# Patient Record
Sex: FEMALE | Race: WHITE | NOT HISPANIC OR LATINO | Employment: FULL TIME | URBAN - METROPOLITAN AREA
[De-identification: names, ages, dates, MRNs, and addresses within clinical notes are randomized per-mention and may not be internally consistent; named-entity substitution may affect disease eponyms.]

---

## 2017-02-02 ENCOUNTER — OFFICE VISIT (OUTPATIENT)
Dept: OPTOMETRY | Facility: CLINIC | Age: 37
End: 2017-02-02
Payer: COMMERCIAL

## 2017-02-02 DIAGNOSIS — H49.12 FOURTH NERVE PALSY OF LEFT EYE: ICD-10-CM

## 2017-02-02 DIAGNOSIS — Z98.890 HISTORY OF STRABISMUS SURGERY: ICD-10-CM

## 2017-02-02 DIAGNOSIS — H52.203 ASTIGMATISM OF EYE, BILATERAL: Primary | ICD-10-CM

## 2017-02-02 PROCEDURE — 92014 COMPRE OPH EXAM EST PT 1/>: CPT | Mod: S$GLB,,, | Performed by: OPTOMETRIST

## 2017-02-02 PROCEDURE — 99999 PR PBB SHADOW E&M-EST. PATIENT-LVL I: CPT | Mod: PBBFAC,,, | Performed by: OPTOMETRIST

## 2017-04-17 DIAGNOSIS — G43.009 MIGRAINE WITHOUT AURA AND WITHOUT STATUS MIGRAINOSUS, NOT INTRACTABLE: ICD-10-CM

## 2017-04-17 RX ORDER — PROPRANOLOL HYDROCHLORIDE 40 MG/1
TABLET ORAL
Qty: 60 TABLET | Refills: 6 | Status: SHIPPED | OUTPATIENT
Start: 2017-04-17 | End: 2017-06-01 | Stop reason: SDUPTHER

## 2017-06-01 ENCOUNTER — OFFICE VISIT (OUTPATIENT)
Dept: NEUROLOGY | Facility: CLINIC | Age: 37
End: 2017-06-01
Payer: COMMERCIAL

## 2017-06-01 VITALS
HEIGHT: 62 IN | DIASTOLIC BLOOD PRESSURE: 78 MMHG | SYSTOLIC BLOOD PRESSURE: 116 MMHG | WEIGHT: 188.25 LBS | BODY MASS INDEX: 34.64 KG/M2 | HEART RATE: 63 BPM

## 2017-06-01 DIAGNOSIS — M54.81 BILATERAL OCCIPITAL NEURALGIA: ICD-10-CM

## 2017-06-01 DIAGNOSIS — G44.86 CERVICOGENIC HEADACHE: ICD-10-CM

## 2017-06-01 DIAGNOSIS — G43.711 CHRONIC MIGRAINE WITHOUT AURA, WITH INTRACTABLE MIGRAINE, SO STATED, WITH STATUS MIGRAINOSUS: Primary | ICD-10-CM

## 2017-06-01 PROCEDURE — 99999 PR PBB SHADOW E&M-EST. PATIENT-LVL III: CPT | Mod: PBBFAC,,, | Performed by: PSYCHIATRY & NEUROLOGY

## 2017-06-01 PROCEDURE — 99214 OFFICE O/P EST MOD 30 MIN: CPT | Mod: S$GLB,,, | Performed by: PSYCHIATRY & NEUROLOGY

## 2017-06-01 RX ORDER — METHOCARBAMOL 500 MG/1
500 TABLET, FILM COATED ORAL NIGHTLY
Qty: 30 TABLET | Refills: 6 | Status: SHIPPED | OUTPATIENT
Start: 2017-06-01 | End: 2017-12-04 | Stop reason: SDUPTHER

## 2017-06-01 RX ORDER — KETOROLAC TROMETHAMINE 10 MG/1
TABLET, FILM COATED ORAL
Qty: 20 TABLET | Refills: 6 | Status: SHIPPED | OUTPATIENT
Start: 2017-06-01 | End: 2017-12-04 | Stop reason: SDUPTHER

## 2017-06-01 RX ORDER — NARATRIPTAN 2.5 MG/1
TABLET ORAL
Qty: 9 TABLET | Refills: 6 | Status: SHIPPED | OUTPATIENT
Start: 2017-06-01 | End: 2017-12-04 | Stop reason: SDUPTHER

## 2017-06-01 RX ORDER — GABAPENTIN 300 MG/1
600 CAPSULE ORAL NIGHTLY
Qty: 30 CAPSULE | Refills: 6 | Status: SHIPPED | OUTPATIENT
Start: 2017-06-01 | End: 2017-10-13 | Stop reason: SDUPTHER

## 2017-06-01 RX ORDER — NAPROXEN 500 MG/1
500 TABLET ORAL 2 TIMES DAILY PRN
Qty: 20 TABLET | Refills: 6 | Status: SHIPPED | OUTPATIENT
Start: 2017-06-01 | End: 2017-12-04 | Stop reason: SDUPTHER

## 2017-06-01 RX ORDER — PROPRANOLOL HYDROCHLORIDE 40 MG/1
40 TABLET ORAL 2 TIMES DAILY
Qty: 60 TABLET | Refills: 6 | Status: SHIPPED | OUTPATIENT
Start: 2017-06-01 | End: 2017-12-04 | Stop reason: SDUPTHER

## 2017-06-01 RX ORDER — SUMATRIPTAN SUCCINATE 100 MG/1
100 TABLET ORAL
Qty: 9 TABLET | Refills: 6 | Status: SHIPPED | OUTPATIENT
Start: 2017-06-01 | End: 2017-07-01

## 2017-06-01 NOTE — PROGRESS NOTES
Subjective:       Patient ID: Silke Fulton is a 36 y.o. female.    Reason for Consult: Migraine      Interval History:  Silke Fulton is here for follow up. Their condition has clinically improved.  On her current regimen she has been able to only half a few headaches since her last visit.  She is very stable with her medication.  She does note that some of the referred pain from the left occipital area to her left retro-orbital area has returned although she does admit that she has not been as diligent with the stretching exercises and home exercise program that she had previously been taught in physical therapy.     Objective:     Vitals:    06/01/17 1315   BP: 116/78   Pulse: 63     Patient is awake alert oriented person place and time.  Cervical range of motion is limited in flexion.  Tinel sign positive at left greater occipital nerve.  Focused examination was undertaken today. Over 50% of face to face time of 25 minute visit time was in giving guidance, counseling and discussing treatment options.    Assessment/Plan:     Problem List Items Addressed This Visit        Neuro    Chronic migraine without aura, with intractable migraine, so stated, with status migrainosus - Primary    Overview     Preventive - gabapentin QHS, robaxin QHS, propranolol BID  Rescue - Imitrex/Naproxen, Amerge, Ketorolac  Triggers - weather, humidity         Current Assessment & Plan     Refill meds, consider weaning meds at next visit         Relevant Medications    gabapentin (NEURONTIN) 300 MG capsule    ketorolac (TORADOL) 10 mg tablet    methocarbamol (ROBAXIN) 500 MG Tab    naproxen (EC NAPROSYN) 500 MG EC tablet    naratriptan (AMERGE) 2.5 MG tablet    propranolol (INDERAL) 40 MG tablet    sumatriptan (IMITREX) 100 MG tablet    Occipital neuralgia    Relevant Medications    gabapentin (NEURONTIN) 300 MG capsule    naproxen (EC NAPROSYN) 500 MG EC tablet    Cervicogenic headache    Relevant Medications     methocarbamol (ROBAXIN) 500 MG Tab    naproxen (EC NAPROSYN) 500 MG EC tablet      Other Visit Diagnoses    None.       36-year-old female presents for evaluation and follow-up of complex multifactorial headaches including cervicogenic headache occipital neuralgia and migraine.  At this point in time I will continue her medications as they are.  I will see her back in 6 months and consider weaning or reducing one of her rescue medications and one of her preventative medications.  I will follow up with them in 6 month(s).  The patient verbalizes understanding and agreement with the treatment plan. I have discussed risks, benefits and alternatives to the treatment plan. Questions were sought and answered to her stated verbal satisfaction.        Lakhwinder Luther MD    This note is dictated on Dragon Natural Speaking word recognition program. There are word recognition mistakes that are occasionally missed on review.

## 2017-06-24 ENCOUNTER — OFFICE VISIT (OUTPATIENT)
Dept: INTERNAL MEDICINE | Facility: CLINIC | Age: 37
End: 2017-06-24
Payer: COMMERCIAL

## 2017-06-24 VITALS
SYSTOLIC BLOOD PRESSURE: 96 MMHG | DIASTOLIC BLOOD PRESSURE: 71 MMHG | WEIGHT: 187.38 LBS | HEIGHT: 62 IN | TEMPERATURE: 99 F | BODY MASS INDEX: 34.48 KG/M2 | HEART RATE: 75 BPM

## 2017-06-24 DIAGNOSIS — J02.9 PHARYNGITIS, UNSPECIFIED ETIOLOGY: ICD-10-CM

## 2017-06-24 DIAGNOSIS — J40 BRONCHITIS: Primary | ICD-10-CM

## 2017-06-24 LAB — DEPRECATED S PYO AG THROAT QL EIA: NEGATIVE

## 2017-06-24 PROCEDURE — 87081 CULTURE SCREEN ONLY: CPT

## 2017-06-24 PROCEDURE — 99213 OFFICE O/P EST LOW 20 MIN: CPT | Mod: S$GLB,,, | Performed by: FAMILY MEDICINE

## 2017-06-24 PROCEDURE — 99999 PR PBB SHADOW E&M-EST. PATIENT-LVL IV: CPT | Mod: PBBFAC,,, | Performed by: FAMILY MEDICINE

## 2017-06-24 PROCEDURE — 87880 STREP A ASSAY W/OPTIC: CPT | Mod: PO

## 2017-06-24 NOTE — PROGRESS NOTES
Subjective:     Patient ID: Silke Fulton is a 36 y.o. female.    Chief Complaint: Nasal Congestion (onset Tuesday)    HPI sick for about 5-6 days with coughing and nasal congestion, some ear pain(mostly the left), sore throat,   Some low grade temp she believes, she has had some chills, some nausea and vomiting, some anterior chest pain -feels like her chest is on fire-worse when she is coughing, her lungs ache, no wheezing or rattling,   Some laryngitis, the cough is severe and shakes her body, she has been using mucinex since Tuesday.  Review of Systems  per HPI  Objective:      Physical Exam   Constitutional: She appears well-developed and well-nourished.   HENT:   Head: Normocephalic and atraumatic.   Right Ear: External ear normal.   Left Ear: External ear normal.   Mild erythema of pharynx, Turbinate edema and congestion   Eyes: Conjunctivae and EOM are normal. Pupils are equal, round, and reactive to light.   Neck: Normal range of motion. Neck supple. No thyromegaly present.   Cardiovascular: Normal rate, regular rhythm, normal heart sounds and intact distal pulses.    Pulmonary/Chest: Effort normal and breath sounds normal.   Lymphadenopathy:     She has no cervical adenopathy.   Skin:   Many (too numerous to count) pigmented nevi of arms and chest and back-reviewed ABCDE of self skin exam and literature on this emailed to patient- recommend she see dermatology for consultaion   Psychiatric: She has a normal mood and affect. Her behavior is normal. Judgment and thought content normal.   Nursing note and vitals reviewed.      Assessment:     Silke was seen today for nasal congestion.    Diagnoses and all orders for this visit:    Bronchitis    Pharyngitis, unspecified etiology  -     THROAT SCREEN, RAPID    Other orders  -     Strep A culture, throat  I will be in touch with the test results and make further recommendations then  Rest fluids, garggle with warm salty water,   Recommend mucinex  and tylenol , call or recheck if symptoms worsen  pigmented nevi- follow with dermatolgy  Encourage self skin exam

## 2017-06-24 NOTE — PATIENT INSTRUCTIONS
What Is Acute Bronchitis?  Acute or short-term bronchitis last for days or weeks. It occurs when the bronchial tubes (airways in the lungs) are irritated by a virus, bacteria, or allergen. This causes a cough that produces yellow or greenish mucus.  Inside healthy lungs    Air travels in and out of the lungs through the airways. The linings of these airways produce sticky mucus. This mucus traps particles that enter the lungs. Tiny structures called cilia then sweep the particles out of the airways.     Healthy airway: Airways are normally open. Air moves in and out easily.      Healthy cilia: Tiny, hairlike cilia sweep mucus and particles up and out of the airways.   Lungs with bronchitis  Bronchitis often occurs with a cold or the flu virus. The airways become inflamed (red and swollen). There is a deep hacking cough from the extra mucus. Other symptoms may include:  · Wheezing  · Chest discomfort  · Shortness of breath  · Mild fever  A second infection, this time due to bacteria, may then occur. And airways irritated by allergens or smoke are more likely to get infected.        Inflamed airway: Inflammation and extra mucus narrow the airway, causing shortness of breath.      Impaired cilia: Extra mucus impairs cilia, causing congestion and wheezing. Smoking makes the problem worse.   Making a diagnosis  A physical exam, health history, and certain tests help your healthcare provider make the diagnosis.  Health history  Your healthcare provider will ask you about your symptoms.  The exam  Your provider listens to your chest for signs of congestion. He or she may also check your ears, nose, and throat.  Possible tests  · A sputum test for bacteria. This requires a sample of mucus from the lungs.  · A nasal or throat swab for bacterial infection.  · A chest X-ray if your healthcare provider thinks you have pneumonia.  · Tests to check for an underlying condition, such as allergies, asthma, or COPD. You may need  to see a specialist for more lung function testing.  Treating a cough  The main treatment for bronchitis is easing symptoms. Avoiding smoke, allergens, and other things that trigger coughing can often help. If the infection is bacterial, you may be given antibiotics. During the illness, it's important to get plenty of sleep. To ease symptoms:  · Dont smoke, and avoid secondhand smoke.  · Use a humidifier, or breathe in steam from a hot shower. This may help loosen mucus.  · Drink a lot of water and juice. They can soothe the throat and may help thin mucus.  · Sit up or use extra pillows when in bed to help lessen coughing and congestion.  · Ask your provider about using cough medicine, pain and fever medicine, or a decongestant.  Antibiotics  Most cases of bronchitis are caused by cold or flu viruses. Antibiotics dont treat viral illness. Taking antibiotics when they are not needed increases your risk of getting an infection later that is antibiotic-resistant. Your provider will prescribe antibiotics if the infection is caused by bacteria. If they are prescribed:  · Take the medicine until it is used up, even if symptoms have improved. If you dont, the bronchitis may come back.  · Take them as directed. For instance, some medicines should be taken with food.  · Ask your provider or pharmacist what side effects are common, and what to do about them.  Follow-up care  You should see your provider again in 2 to 3 weeks. By this time, symptoms should have improved. An infection that lasts longer may mean you have a more serious problem.  Prevention  · Avoid tobacco smoke. If you smoke, quit. Stay away from smoky places. Ask friends and family not to smoke around you, or in your home or car.  · Get checked for allergies.  · Ask your provider about getting a yearly flu shot, and pneumococcal or pneumonia shots.  · Wash your hands often. This helps reduce the chance of picking up viruses that cause colds and flu.  Call  your healthcare provider if:  · Symptoms worsen, or new symptoms develop.  · Breathing problems worsen or  become severe.  · Symptoms dont get better within a week, or within 3 days of taking antibiotics.   Date Last Reviewed: 6/18/2014  © [id number] [employer]. [address] [place] [address], [place] [address]. All rights reserved. This information is not intended as a substitute for professional medical care. Always follow your healthcare professional's instructions.

## 2017-06-26 LAB — BACTERIA THROAT CULT: NORMAL

## 2017-08-03 ENCOUNTER — TELEPHONE (OUTPATIENT)
Dept: INTERNAL MEDICINE | Facility: CLINIC | Age: 37
End: 2017-08-03

## 2017-08-03 NOTE — TELEPHONE ENCOUNTER
----- Message from Ascencion Corrales sent at 8/3/2017 12:27 PM CDT -----  Contact: self  cell phone   Need order in for Urine test to [facility], pt believes she has UTI.    Patient state if no answer its okay to leave a message on vm.

## 2017-08-04 ENCOUNTER — LAB VISIT (OUTPATIENT)
Dept: LAB | Facility: HOSPITAL | Age: 37
End: 2017-08-04
Attending: INTERNAL MEDICINE
Payer: COMMERCIAL

## 2017-08-04 ENCOUNTER — OFFICE VISIT (OUTPATIENT)
Dept: INTERNAL MEDICINE | Facility: CLINIC | Age: 37
End: 2017-08-04
Payer: COMMERCIAL

## 2017-08-04 VITALS
BODY MASS INDEX: 34.69 KG/M2 | HEART RATE: 93 BPM | DIASTOLIC BLOOD PRESSURE: 73 MMHG | HEIGHT: 62 IN | WEIGHT: 188.5 LBS | TEMPERATURE: 99 F | SYSTOLIC BLOOD PRESSURE: 120 MMHG

## 2017-08-04 DIAGNOSIS — R21 RASH: ICD-10-CM

## 2017-08-04 DIAGNOSIS — N39.0 URINARY TRACT INFECTION WITHOUT HEMATURIA, SITE UNSPECIFIED: ICD-10-CM

## 2017-08-04 DIAGNOSIS — N39.0 URINARY TRACT INFECTION WITHOUT HEMATURIA, SITE UNSPECIFIED: Primary | ICD-10-CM

## 2017-08-04 LAB
BACTERIA #/AREA URNS AUTO: ABNORMAL /HPF
BILIRUB UR QL STRIP: NEGATIVE
CLARITY UR REFRACT.AUTO: ABNORMAL
COLOR UR AUTO: YELLOW
GLUCOSE UR QL STRIP: NEGATIVE
HGB UR QL STRIP: NEGATIVE
KETONES UR QL STRIP: NEGATIVE
LEUKOCYTE ESTERASE UR QL STRIP: ABNORMAL
MICROSCOPIC COMMENT: ABNORMAL
NITRITE UR QL STRIP: NEGATIVE
PH UR STRIP: 5 [PH] (ref 5–8)
PROT UR QL STRIP: NEGATIVE
RBC #/AREA URNS AUTO: 2 /HPF (ref 0–4)
SP GR UR STRIP: 1.01 (ref 1–1.03)
SQUAMOUS #/AREA URNS AUTO: 5 /HPF
URN SPEC COLLECT METH UR: ABNORMAL
UROBILINOGEN UR STRIP-ACNC: NEGATIVE EU/DL
WBC #/AREA URNS AUTO: 23 /HPF (ref 0–5)

## 2017-08-04 PROCEDURE — 87086 URINE CULTURE/COLONY COUNT: CPT

## 2017-08-04 PROCEDURE — 87077 CULTURE AEROBIC IDENTIFY: CPT

## 2017-08-04 PROCEDURE — 99999 PR PBB SHADOW E&M-EST. PATIENT-LVL III: CPT | Mod: PBBFAC,,, | Performed by: INTERNAL MEDICINE

## 2017-08-04 PROCEDURE — 87186 SC STD MICRODIL/AGAR DIL: CPT

## 2017-08-04 PROCEDURE — 99213 OFFICE O/P EST LOW 20 MIN: CPT | Mod: S$GLB,,, | Performed by: INTERNAL MEDICINE

## 2017-08-04 PROCEDURE — 81001 URINALYSIS AUTO W/SCOPE: CPT

## 2017-08-04 PROCEDURE — 3008F BODY MASS INDEX DOCD: CPT | Mod: S$GLB,,, | Performed by: INTERNAL MEDICINE

## 2017-08-04 PROCEDURE — 87088 URINE BACTERIA CULTURE: CPT

## 2017-08-04 RX ORDER — NITROFURANTOIN 25; 75 MG/1; MG/1
100 CAPSULE ORAL 2 TIMES DAILY
Qty: 10 CAPSULE | Refills: 0 | Status: SHIPPED | OUTPATIENT
Start: 2017-08-04 | End: 2017-08-09

## 2017-08-04 RX ORDER — SUMATRIPTAN SUCCINATE 100 MG/1
100 TABLET ORAL
COMMUNITY
End: 2017-12-04 | Stop reason: SDUPTHER

## 2017-08-04 RX ORDER — PHENAZOPYRIDINE HYDROCHLORIDE 200 MG/1
200 TABLET, FILM COATED ORAL 3 TIMES DAILY PRN
Qty: 15 TABLET | Refills: 0 | Status: SHIPPED | OUTPATIENT
Start: 2017-08-04 | End: 2017-08-07

## 2017-08-04 NOTE — PROGRESS NOTES
Subjective:       Patient ID: Silke Fulton is a 37 y.o. female.    Chief Complaint: Urinary Tract Infection    HPI     37-year-old female here for evaluation of frequent urination.  Started about last week.  She had a cold and saw Dr. Forrester.  She reports that she has urinary frequency and sometimes has dysuria.  She finished her Augmentin.  She has urinary hesitancy.  She has not taken anything for the symptoms yet.    She has a rash on her side that has been present for 3 weeks.  Looked like it was a bit that she scratched.  She got some red, heat bumps and had itching. It started from the initial bite and went to her groin.  The prednisone she took for her ears helped.    Review of Systems    Objective:      Physical Exam   Constitutional: She is oriented to person, place, and time. She appears well-developed and well-nourished.   HENT:   Head: Normocephalic and atraumatic.   Mouth/Throat: No oropharyngeal exudate.   Eyes: EOM are normal. Pupils are equal, round, and reactive to light. Right eye exhibits no discharge. Left eye exhibits no discharge. No scleral icterus.   Neck: Normal range of motion. Neck supple. No tracheal deviation present. No thyromegaly present.   Cardiovascular: Normal rate, regular rhythm and normal heart sounds.  Exam reveals no gallop and no friction rub.    No murmur heard.  Pulmonary/Chest: Effort normal and breath sounds normal. No respiratory distress. She has no wheezes. She has no rales. She exhibits no tenderness.   Abdominal: Soft. Bowel sounds are normal. She exhibits no distension and no mass. There is no tenderness. There is no rebound and no guarding.   Musculoskeletal: Normal range of motion. She exhibits no edema or tenderness.   Neurological: She is alert and oriented to person, place, and time.   Skin: Skin is warm and dry. No rash noted. No erythema. No pallor.   Psychiatric: She has a normal mood and affect. Her behavior is normal.   Vitals reviewed.       Assessment:       1. Urinary tract infection without hematuria, site unspecified    2. Rash        Plan:       1.  Macrobid 100 mg twice a day ×5 days.  Pyridium as needed.  Urine culture and urinalysis.  2.  Resolved with prednisone.  Monitor.

## 2017-08-07 LAB — BACTERIA UR CULT: NORMAL

## 2017-08-08 ENCOUNTER — TELEPHONE (OUTPATIENT)
Dept: INTERNAL MEDICINE | Facility: CLINIC | Age: 37
End: 2017-08-08

## 2017-08-29 ENCOUNTER — TELEPHONE (OUTPATIENT)
Dept: INTERNAL MEDICINE | Facility: CLINIC | Age: 37
End: 2017-08-29

## 2017-08-29 NOTE — TELEPHONE ENCOUNTER
Spoke to pt. Pt advised that her she had a tdap in 9/2007, therefore she us UTD on the pertussis.  I advised that she is due for a tetanus booster, and she can get this at her local pharmacy.

## 2017-08-29 NOTE — TELEPHONE ENCOUNTER
----- Message from Kathie Gomez sent at 8/29/2017  1:50 PM CDT -----  Contact: Self [phone]  Pt requesting a call back in regard to immunizations. Would like to know if she is up to date with her pertussis vaccination if not how would she get it. Please advise

## 2017-10-13 DIAGNOSIS — G43.711 CHRONIC MIGRAINE WITHOUT AURA, WITH INTRACTABLE MIGRAINE, SO STATED, WITH STATUS MIGRAINOSUS: ICD-10-CM

## 2017-10-13 DIAGNOSIS — M54.81 BILATERAL OCCIPITAL NEURALGIA: ICD-10-CM

## 2017-10-16 RX ORDER — GABAPENTIN 300 MG/1
CAPSULE ORAL
Qty: 180 CAPSULE | Refills: 6 | Status: SHIPPED | OUTPATIENT
Start: 2017-10-16 | End: 2017-12-04 | Stop reason: SDUPTHER

## 2017-11-01 ENCOUNTER — OFFICE VISIT (OUTPATIENT)
Dept: OBSTETRICS AND GYNECOLOGY | Facility: CLINIC | Age: 37
End: 2017-11-01
Payer: COMMERCIAL

## 2017-11-01 VITALS
DIASTOLIC BLOOD PRESSURE: 70 MMHG | HEIGHT: 62 IN | BODY MASS INDEX: 33.68 KG/M2 | SYSTOLIC BLOOD PRESSURE: 114 MMHG | WEIGHT: 183 LBS

## 2017-11-01 DIAGNOSIS — Z01.419 VISIT FOR GYNECOLOGIC EXAMINATION: Primary | ICD-10-CM

## 2017-11-01 PROCEDURE — 88175 CYTOPATH C/V AUTO FLUID REDO: CPT

## 2017-11-01 PROCEDURE — 99395 PREV VISIT EST AGE 18-39: CPT | Mod: S$GLB,,, | Performed by: OBSTETRICS & GYNECOLOGY

## 2017-11-01 PROCEDURE — 99999 PR PBB SHADOW E&M-EST. PATIENT-LVL III: CPT | Mod: PBBFAC,,, | Performed by: OBSTETRICS & GYNECOLOGY

## 2017-11-01 NOTE — PROGRESS NOTES
"HISTORY OF PRESENT ILLNESS:    Silke Fulton is a 37 y.o. female, , Patient's last menstrual period was 10/27/2017.,  presents for a routine exam and has no complaints. PAP DUE AND SUBMITTED.  HAS SOME RLQ PAIN, INTERMITTENT OVER THE PAST FEW MONTHS, NOT ASSOCIATED WITH CYCLE.   POSSIBLY ASSOCIATED GI SYMPTOMS/ PROBLEMS.  REASSURED NORMAL EXAM.  DECLINES CONTRACEPTION    LAST VISIT 2016:   PAP DUE .  SISTER WITH LOSS - FAMILY WITH ?TRISOMY 13 TRANSLOCATION - (PT SAYS THAT 13 AND 14TH CHROMOSOMES CONNECTED) - WILL CHECK PT'S CHROMOSOMES NOW.  NOT CONTRACEPTING BUT NOT ACTIVELY TRYING.    LAST VISIT 2015:  LAST PAP , DISCUSSED SCREENING RECOMMENDATIONS. PREFERS PAP THIS YEAR AND THEN WILL FOLLOW RECS. STOPPED OCP LAST YEAR (HAD SOME PEDAL EDEMA LAST YEAR). DECLINES PROGESTERONE-ONLY CONTRACEPTION AND "WAIT AND SEE." - ADVISED PNV. IS CONSIDERING ACTIVELY PURSUING PREGNANCY AND WILL REF TO  FIRST FOR EVALUATION.  LLQ PAIN X 1 WEEK IN July, PRECEEDED MENSES.    Past Medical History:   Diagnosis Date    Acute gastritis without mention of hemorrhage     Esophageal reflux     Meningitis     staph aureus    Migraine without aura, without mention of intractable migraine without mention of status migrainosus     Personal history of malignant neoplasm of brain     cerebellar astrocytoma    Tarsal tunnel syndrome        Past Surgical History:   Procedure Laterality Date    cerebellar astrocytoma      eye-superior oblique palsy      right mastoidectomy         MEDICATIONS AND ALLERGIES:      Current Outpatient Prescriptions:     FEVERFEW ORAL, Take by mouth., Disp: , Rfl:     gabapentin (NEURONTIN) 300 MG capsule, TAKE 2 CAPSULES BY MOUTH EVERY EVENING, Disp: 180 capsule, Rfl: 6    ginkgo biloba 40 mg Tab, Take 120 mg by mouth., Disp: , Rfl:     ketorolac (TORADOL) 10 mg tablet, 1 pill every 8 hours as needed for headache, not to be used more than 3x in week, Disp: 20 tablet, Rfl: " 6    magnesium 250 mg Tab, Take 500 mg by mouth once daily. , Disp: , Rfl:     methocarbamol (ROBAXIN) 500 MG Tab, Take 1 tablet (500 mg total) by mouth every evening., Disp: 30 tablet, Rfl: 6    multivitamin (THERAGRAN) per tablet, Take 1 tablet by mouth once daily.  , Disp: , Rfl:     naproxen (EC NAPROSYN) 500 MG EC tablet, Take 1 tablet (500 mg total) by mouth 2 (two) times daily as needed (headache)., Disp: 20 tablet, Rfl: 6    naratriptan (AMERGE) 2.5 MG tablet, TAKE 1 TABLET BY MOUTH ONSET OF HEADACHE MAY REPEAT IN 4 HRS IF NEEDED MAX 2 TABS/3DAYS USE IN WEEK, Disp: 9 tablet, Rfl: 6    polycarbophil (FIBERCON) 625 mg tablet, Take 625 mg by mouth once daily., Disp: , Rfl:     propranolol (INDERAL) 40 MG tablet, Take 1 tablet (40 mg total) by mouth 2 (two) times daily., Disp: 60 tablet, Rfl: 6    sumatriptan (IMITREX) 100 MG tablet, Take 100 mg by mouth every 2 (two) hours as needed for Migraine., Disp: , Rfl:     Review of patient's allergies indicates:   Allergen Reactions    Septra [sulfamethoxazole-trimethoprim] Other (See Comments)    Sulfa (sulfonamide antibiotics)      Bone marrow suppression         Family History   Problem Relation Age of Onset    Breast cancer Maternal Aunt     Migraines Mother     Arrhythmia Mother     Asthma Mother     Asthma Brother     Heart failure Maternal Grandmother     Hyperlipidemia Maternal Grandmother     Hypertension Maternal Grandmother     Osteoarthritis Maternal Grandmother     Alzheimer's disease Maternal Grandmother     Hypertension Maternal Grandfather     Osteoarthritis Maternal Grandfather     Alzheimer's disease Maternal Grandfather     Colon cancer Neg Hx        Social History     Social History    Marital status:      Spouse name: N/A    Number of children: 0    Years of education: 20     Occupational History    [employer]     Social History Main Topics    Smoking status: Never Smoker    Smokeless  "tobacco: Never Used    Alcohol use 1.2 oz/week     2 Standard drinks or equivalent per week      Comment: socially    Drug use: No    Sexual activity: Yes     Partners: Male     Birth control/ protection: None     Other Topics Concern    Not on file     Social History Narrative           COMPREHENSIVE GYN HISTORY:  PAP History: Denies abnormal Paps.  Infection History: Denies STDs. Denies PID.  Benign History: Denies uterine fibroids. Denies ovarian cysts. Denies endometriosis. Denies other conditions.  Cancer History: Denies cervical cancer. Denies uterine cancer or hyperplasia. Denies ovarian cancer. Denies vulvar cancer or pre-cancer. Denies vaginal cancer or pre-cancer. Denies breast cancer. Denies colon cancer.  Sexual Activity History: Reports currently being sexually active  Menstrual History: Monthly, mild-moderate.  Contraception:no method    ROS:  GENERAL: No weight changes. No swelling. No fatigue. No fever.  CARDIOVASCULAR: No chest pain. No shortness of breath. No leg cramps.   NEUROLOGICAL: No headaches. No vision changes.  BREASTS: No pain. No lumps. No discharge.  ABDOMEN: No pain. No nausea. No vomiting. No diarrhea. No constipation.  REPRODUCTIVE: No abnormal bleeding.   VULVA: No pain. No lesions. No itching.  VAGINA: No relaxation. No itching. No odor. No discharge. No lesions.  URINARY: No incontinence. No nocturia. No frequency. No dysuria.    /70   Ht 5' 2" (1.575 m)   Wt 83 kg (182 lb 15.7 oz)   LMP 10/27/2017   BMI 33.47 kg/m²     PE:  APPEARANCE: Well nourished, well developed, in no acute distress.  AFFECT: WNL, alert and oriented x 3.  SKIN: No acne or hirsutism.  NECK: Neck symmetric, without masses or thyromegaly.  NODES: No inguinal, cervical, axillary or femoral lymph node enlargement.  CHEST: Good respiratory effort.   ABDOMEN: Soft. No tenderness or masses. No hepatosplenomegaly. No hernias.  BREASTS: Symmetrical, no skin changes, visible lesions, palpable " masses or nipple discharge bilaterally.  PELVIC: External female genitalia without lesions.  Female hair distribution. Adequate perineal body, Normal urethral meatus. Vagina moist and well rugated without lesions or discharge.  No significant cystocele or rectocele present. Cervix pink without lesions, discharge or tenderness. Uterus is normal size, regular, mobile and nontender. Adnexa without masses or tenderness.  EXTREMITIES: No edema    PROCEDURES:  Pap    DIAGNOSIS:  1. Visit for gynecologic examination  Liquid-based pap smear, screening       LABS AND TESTS ORDERED:    MEDICATIONS PRESCRIBED:    COUNSELING:   The patient was counseled today on ACS PAP guidelines, with recommendations for yearly pelvic exams unless their uterus, cervix, and ovaries were removed for benign reasons; in that case, examinations every 3-5 years are recommended.  The patient was also counseled regarding monthly breast self-examination, routine STD screening for at-risk populations, prophylactic immunizations for transmitted infections such as  HPV, Pertussis, or Influenza as appropriate, and yearly mammograms when indicated by ACS guidelines.  She was advised to see her primary care physician for all other health maintenance.    FOLLOW-UP with me annually.

## 2017-11-12 ENCOUNTER — PATIENT MESSAGE (OUTPATIENT)
Dept: OBSTETRICS AND GYNECOLOGY | Facility: HOSPITAL | Age: 37
End: 2017-11-12

## 2017-12-04 ENCOUNTER — OFFICE VISIT (OUTPATIENT)
Dept: NEUROLOGY | Facility: CLINIC | Age: 37
End: 2017-12-04
Payer: COMMERCIAL

## 2017-12-04 ENCOUNTER — TELEPHONE (OUTPATIENT)
Dept: INTERNAL MEDICINE | Facility: CLINIC | Age: 37
End: 2017-12-04

## 2017-12-04 VITALS
HEIGHT: 62 IN | BODY MASS INDEX: 34.29 KG/M2 | DIASTOLIC BLOOD PRESSURE: 78 MMHG | SYSTOLIC BLOOD PRESSURE: 142 MMHG | HEART RATE: 64 BPM | WEIGHT: 186.31 LBS

## 2017-12-04 DIAGNOSIS — G44.86 CERVICOGENIC HEADACHE: ICD-10-CM

## 2017-12-04 DIAGNOSIS — M54.81 BILATERAL OCCIPITAL NEURALGIA: ICD-10-CM

## 2017-12-04 DIAGNOSIS — Z85.841 PERSONAL HISTORY OF MALIGNANT NEOPLASM OF BRAIN: ICD-10-CM

## 2017-12-04 DIAGNOSIS — G43.711 CHRONIC MIGRAINE WITHOUT AURA, WITH INTRACTABLE MIGRAINE, SO STATED, WITH STATUS MIGRAINOSUS: Primary | ICD-10-CM

## 2017-12-04 PROCEDURE — 99999 PR PBB SHADOW E&M-EST. PATIENT-LVL III: CPT | Mod: PBBFAC,,, | Performed by: PSYCHIATRY & NEUROLOGY

## 2017-12-04 PROCEDURE — 99214 OFFICE O/P EST MOD 30 MIN: CPT | Mod: S$GLB,,, | Performed by: PSYCHIATRY & NEUROLOGY

## 2017-12-04 RX ORDER — PROPRANOLOL HYDROCHLORIDE 40 MG/1
40 TABLET ORAL 2 TIMES DAILY
Qty: 60 TABLET | Refills: 6 | Status: SHIPPED | OUTPATIENT
Start: 2017-12-04 | End: 2017-12-15 | Stop reason: SDUPTHER

## 2017-12-04 RX ORDER — METHOCARBAMOL 500 MG/1
500 TABLET, FILM COATED ORAL NIGHTLY
Qty: 30 TABLET | Refills: 6 | Status: SHIPPED | OUTPATIENT
Start: 2017-12-04 | End: 2018-09-11 | Stop reason: SDUPTHER

## 2017-12-04 RX ORDER — SUMATRIPTAN SUCCINATE 100 MG/1
100 TABLET ORAL
Qty: 9 TABLET | Refills: 6 | Status: SHIPPED | OUTPATIENT
Start: 2017-12-04 | End: 2018-04-30 | Stop reason: SDUPTHER

## 2017-12-04 RX ORDER — GABAPENTIN 300 MG/1
600 CAPSULE ORAL NIGHTLY
Qty: 180 CAPSULE | Refills: 6 | Status: SHIPPED | OUTPATIENT
Start: 2017-12-04 | End: 2017-12-15 | Stop reason: SDUPTHER

## 2017-12-04 RX ORDER — NARATRIPTAN 2.5 MG/1
TABLET ORAL
Qty: 9 TABLET | Refills: 6 | Status: SHIPPED | OUTPATIENT
Start: 2017-12-04 | End: 2018-04-30 | Stop reason: SDUPTHER

## 2017-12-04 RX ORDER — NAPROXEN 500 MG/1
500 TABLET ORAL 2 TIMES DAILY PRN
Qty: 20 TABLET | Refills: 6 | Status: SHIPPED | OUTPATIENT
Start: 2017-12-04 | End: 2018-04-30 | Stop reason: SDUPTHER

## 2017-12-04 RX ORDER — KETOROLAC TROMETHAMINE 10 MG/1
TABLET, FILM COATED ORAL
Qty: 20 TABLET | Refills: 6 | Status: SHIPPED | OUTPATIENT
Start: 2017-12-04 | End: 2018-04-30 | Stop reason: SDUPTHER

## 2017-12-04 NOTE — TELEPHONE ENCOUNTER
----- Message from Joe Islas sent at 12/4/2017  9:01 AM CST -----  Contact: Pt at [phone]  Doctor appointment and lab have been scheduled.  Please link lab orders to the lab appointment.  Date of doctor appointment:  4/2  Physical or EP:  physical  Date of lab appointment:  3/26  Comments:

## 2017-12-04 NOTE — TELEPHONE ENCOUNTER
Dr. Gifford informed.  Pt needs to be seen before order any labs.  Pt called. No answer. Left message informing that the lab appointment was cancelled.

## 2017-12-04 NOTE — PROGRESS NOTES
Subjective:       Patient ID: Silke Fulton is a 37 y.o. female.    Reason for Consult: Headache      Interval History:  Silke Fulton is here for follow up. Their condition has somewhat worsened.  She notes her headaches seem to be worse and she thinks this may be stress.    She has been having some marital strife recently.  She notes that overall she is compliant with her preventative medication regimen but notes that her headaches seem to intensify especially when she gets arguments with her .  We have discussed that it has been 2 years since her last MRI brain given her prior history of malignancy and would be reasonable to update the imaging.     Objective:     Vitals:    12/04/17 1526   BP: (!) 142/78   Pulse: 64     Patient is awake alert oriented to person place and time.  Moves all 4 tremors against gravity.  Gait and station within normal limits limits.  Cranial nerves II through XII without focal deficit.  Mild esophoria on the right eye.  Limited cervical range of motion especially on rotation.  Focused examination was undertaken today. Over 50% of face to face time of 25 minute visit time was in giving guidance, counseling and discussing treatment options.    Assessment/Plan:     Problem List Items Addressed This Visit        Neuro    Chronic migraine without aura, with intractable migraine, so stated, with status migrainosus - Primary    Overview     Preventive - gabapentin QHS, robaxin QHS, propranolol BID  Rescue - Imitrex/Naproxen, Amerge, Ketorolac  Triggers - weather, humidity         Relevant Medications    sumatriptan (IMITREX) 100 MG tablet    propranolol (INDERAL) 40 MG tablet    naratriptan (AMERGE) 2.5 MG tablet    naproxen (EC NAPROSYN) 500 MG EC tablet    methocarbamol (ROBAXIN) 500 MG Tab    ketorolac (TORADOL) 10 mg tablet    gabapentin (NEURONTIN) 300 MG capsule    Cervicogenic headache    Relevant Medications    naproxen (EC NAPROSYN) 500 MG EC tablet     methocarbamol (ROBAXIN) 500 MG Tab       Oncology    Personal history of malignant neoplasm of brain    Overview     cerebellar astrocytoma         Relevant Orders    MRI Brain W WO Contrast       Orthopedic    Occipital neuralgia    Relevant Medications    naproxen (EC NAPROSYN) 500 MG EC tablet    gabapentin (NEURONTIN) 300 MG capsule        37-year-old right-handed female presents for evaluation and follow-up of complex multifactorial headaches.  At this point in time I'll update imaging of her brain to make sure that there has not been a concurrence of worsening of her headaches with a recurrence of her CNS malignancy.  I will also refill her medications.  We will not weaning her medication at this time.  I'll see her back in 3 months and consider nerve blocks versus Botox versus medication adjustment.    The patient verbalizes understanding and agreement with the treatment plan. I have discussed risks, benefits and alternatives to the treatment plan. Questions were sought and answered to her stated verbal satisfaction.        Lakhwinder Luther MD    This note is dictated on Dragon Natural Speaking word recognition program. There are word recognition mistakes that are occasionally missed on review.

## 2017-12-15 DIAGNOSIS — M54.81 BILATERAL OCCIPITAL NEURALGIA: ICD-10-CM

## 2017-12-15 DIAGNOSIS — G43.711 CHRONIC MIGRAINE WITHOUT AURA, WITH INTRACTABLE MIGRAINE, SO STATED, WITH STATUS MIGRAINOSUS: ICD-10-CM

## 2017-12-15 RX ORDER — GABAPENTIN 300 MG/1
CAPSULE ORAL
Qty: 180 CAPSULE | Refills: 5 | Status: SHIPPED | OUTPATIENT
Start: 2017-12-15 | End: 2018-04-30 | Stop reason: SDUPTHER

## 2017-12-15 RX ORDER — PROPRANOLOL HYDROCHLORIDE 40 MG/1
TABLET ORAL
Qty: 60 TABLET | Refills: 2 | Status: SHIPPED | OUTPATIENT
Start: 2017-12-15 | End: 2018-04-30 | Stop reason: SDUPTHER

## 2017-12-22 ENCOUNTER — HOSPITAL ENCOUNTER (OUTPATIENT)
Dept: RADIOLOGY | Facility: HOSPITAL | Age: 37
Discharge: HOME | End: 2017-12-22
Attending: PSYCHIATRY & NEUROLOGY
Payer: COMMERCIAL

## 2017-12-22 ENCOUNTER — TELEPHONE (OUTPATIENT)
Dept: RADIOLOGY | Facility: HOSPITAL | Age: 37
End: 2017-12-22

## 2017-12-22 DIAGNOSIS — Z85.841 PERSONAL HISTORY OF MALIGNANT NEOPLASM OF BRAIN: ICD-10-CM

## 2017-12-22 PROCEDURE — 25500020 PHARM REV CODE 255: Performed by: PSYCHIATRY & NEUROLOGY

## 2017-12-22 PROCEDURE — 70553 MRI BRAIN STEM W/O & W/DYE: CPT | Mod: 26,,, | Performed by: RADIOLOGY

## 2017-12-22 PROCEDURE — 70553 MRI BRAIN STEM W/O & W/DYE: CPT | Mod: TC

## 2017-12-22 PROCEDURE — A9585 GADOBUTROL INJECTION: HCPCS | Performed by: PSYCHIATRY & NEUROLOGY

## 2017-12-22 RX ORDER — GADOBUTROL 604.72 MG/ML
8 INJECTION INTRAVENOUS
Status: COMPLETED | OUTPATIENT
Start: 2017-12-22 | End: 2017-12-22

## 2017-12-22 RX ADMIN — GADOBUTROL 8 ML: 604.72 INJECTION INTRAVENOUS at 11:12

## 2018-02-08 ENCOUNTER — OFFICE VISIT (OUTPATIENT)
Dept: OPTOMETRY | Facility: CLINIC | Age: 38
End: 2018-02-08
Payer: COMMERCIAL

## 2018-02-08 DIAGNOSIS — H49.12 FOURTH NERVE PALSY OF LEFT EYE: Primary | ICD-10-CM

## 2018-02-08 DIAGNOSIS — Z98.890 HISTORY OF STRABISMUS SURGERY: ICD-10-CM

## 2018-02-08 DIAGNOSIS — H52.203 ASTIGMATISM OF BOTH EYES, UNSPECIFIED TYPE: ICD-10-CM

## 2018-02-08 PROCEDURE — 92015 DETERMINE REFRACTIVE STATE: CPT | Mod: S$GLB,,, | Performed by: OPTOMETRIST

## 2018-02-08 PROCEDURE — 99212 OFFICE O/P EST SF 10 MIN: CPT | Performed by: OPTOMETRIST

## 2018-02-08 PROCEDURE — 92014 COMPRE OPH EXAM EST PT 1/>: CPT | Mod: S$GLB,,, | Performed by: OPTOMETRIST

## 2018-02-08 PROCEDURE — 99999 PR PBB SHADOW E&M-EST. PATIENT-LVL II: CPT | Mod: PBBFAC,,, | Performed by: OPTOMETRIST

## 2018-02-08 NOTE — PROGRESS NOTES
HPI     Patient's age: 37 y.o.    Approximate date of last eye examination:  2/2/17  Name of last eye doctor seen: Dr. Cruz    Pt states that she is here for annual  Eye exam, not having any trouble   with eyes.    Wears glasses? yes     Wears CLs?:  no            Headaches?  no  Eye pain/discomfort?  no                                                                                     Flashes?  no  Floaters?  yes  Diplopia/Double vision?  no    Patient's Ocular History:         Any eye surgeries? no        Family history of eye disease?  no    Significant patient medical history:         1. Diabetes?  no       If yes, IDDM or NIDDM? no   2. HBP?  no                 ! OTC eyedrops currently using:  none   ! Prescription eye meds currently using:  none         Last edited by Meggan Rodriguez MA on 2/8/2018  3:42 PM. (History)            Assessment /Plan     For exam results, see Encounter Report.    Fourth nerve palsy of left eye    History of strabismus surgery    Astigmatism of both eyes, unspecified type      Fourth nerve palsy of left eye  History of strabismus surgery  Pt reports surgery when she was 11 years old  Astigmatism of eye, bilateral  No change in specs at this time, monitor        Good internal ocular health today     RTC 1 year, sooner PRN

## 2018-03-12 DIAGNOSIS — G44.86 CERVICOGENIC HEADACHE: ICD-10-CM

## 2018-03-12 DIAGNOSIS — G43.711 CHRONIC MIGRAINE WITHOUT AURA, WITH INTRACTABLE MIGRAINE, SO STATED, WITH STATUS MIGRAINOSUS: ICD-10-CM

## 2018-03-13 RX ORDER — METHOCARBAMOL 500 MG/1
TABLET, FILM COATED ORAL
Qty: 30 TABLET | Refills: 6 | Status: SHIPPED | OUTPATIENT
Start: 2018-03-13 | End: 2018-04-02 | Stop reason: SDUPTHER

## 2018-03-16 ENCOUNTER — TELEPHONE (OUTPATIENT)
Dept: INTERNAL MEDICINE | Facility: CLINIC | Age: 38
End: 2018-03-16

## 2018-03-16 RX ORDER — MUPIROCIN 20 MG/G
OINTMENT TOPICAL 3 TIMES DAILY
Qty: 15 G | Refills: 0 | Status: SHIPPED | OUTPATIENT
Start: 2018-03-16 | End: 2018-04-02

## 2018-03-16 NOTE — TELEPHONE ENCOUNTER
----- Message from PrimoCatina Roshan sent at 3/16/2018  8:22 AM CDT -----  Contact: Pt at [phone]  Pt said her dog has staff infection and she has a cut on her hand, she has been touching all over her dog. Pt requesting antibiotics to be called in to pharmacy so that she does not get infected if possible.     [facility]/pharmacy #[id number] - [place] - [address]   [phone] (Phone)  [phone] (Fax)

## 2018-03-16 NOTE — TELEPHONE ENCOUNTER
escripted Bactroban to apply to her hand wound  Please remind her to use Qtip or other applicator to apply ointment from the tube to her wound

## 2018-04-02 ENCOUNTER — OFFICE VISIT (OUTPATIENT)
Dept: INTERNAL MEDICINE | Facility: CLINIC | Age: 38
End: 2018-04-02
Payer: COMMERCIAL

## 2018-04-02 ENCOUNTER — LAB VISIT (OUTPATIENT)
Dept: LAB | Facility: HOSPITAL | Age: 38
End: 2018-04-02
Attending: INTERNAL MEDICINE
Payer: COMMERCIAL

## 2018-04-02 VITALS
BODY MASS INDEX: 35.01 KG/M2 | HEART RATE: 60 BPM | HEIGHT: 62 IN | SYSTOLIC BLOOD PRESSURE: 102 MMHG | RESPIRATION RATE: 16 BRPM | WEIGHT: 190.25 LBS | DIASTOLIC BLOOD PRESSURE: 74 MMHG | TEMPERATURE: 99 F

## 2018-04-02 DIAGNOSIS — Z00.00 ANNUAL PHYSICAL EXAM: Primary | ICD-10-CM

## 2018-04-02 DIAGNOSIS — G43.711 CHRONIC MIGRAINE WITHOUT AURA, WITH INTRACTABLE MIGRAINE, SO STATED, WITH STATUS MIGRAINOSUS: ICD-10-CM

## 2018-04-02 DIAGNOSIS — K29.70 GASTRITIS, PRESENCE OF BLEEDING UNSPECIFIED, UNSPECIFIED CHRONICITY, UNSPECIFIED GASTRITIS TYPE: ICD-10-CM

## 2018-04-02 DIAGNOSIS — L03.012 PARONYCHIA OF LEFT MIDDLE FINGER: ICD-10-CM

## 2018-04-02 DIAGNOSIS — Z00.00 ANNUAL PHYSICAL EXAM: ICD-10-CM

## 2018-04-02 DIAGNOSIS — Z85.841 PERSONAL HISTORY OF MALIGNANT NEOPLASM OF BRAIN: ICD-10-CM

## 2018-04-02 LAB
ALBUMIN SERPL BCP-MCNC: 3.6 G/DL
ALP SERPL-CCNC: 71 U/L
ALT SERPL W/O P-5'-P-CCNC: 18 U/L
ANION GAP SERPL CALC-SCNC: 6 MMOL/L
AST SERPL-CCNC: 19 U/L
BASOPHILS # BLD AUTO: 0.02 K/UL
BASOPHILS NFR BLD: 0.3 %
BILIRUB SERPL-MCNC: 0.3 MG/DL
BUN SERPL-MCNC: 15 MG/DL
CALCIUM SERPL-MCNC: 9 MG/DL
CHLORIDE SERPL-SCNC: 107 MMOL/L
CHOLEST SERPL-MCNC: 150 MG/DL
CHOLEST/HDLC SERPL: 9.4 {RATIO}
CO2 SERPL-SCNC: 27 MMOL/L
CREAT SERPL-MCNC: 0.8 MG/DL
DIFFERENTIAL METHOD: NORMAL
EOSINOPHIL # BLD AUTO: 0.5 K/UL
EOSINOPHIL NFR BLD: 7.4 %
ERYTHROCYTE [DISTWIDTH] IN BLOOD BY AUTOMATED COUNT: 13.1 %
EST. GFR  (AFRICAN AMERICAN): >60 ML/MIN/1.73 M^2
EST. GFR  (NON AFRICAN AMERICAN): >60 ML/MIN/1.73 M^2
GLUCOSE SERPL-MCNC: 87 MG/DL
HCT VFR BLD AUTO: 40.4 %
HDLC SERPL-MCNC: 16 MG/DL
HDLC SERPL: 10.7 %
HGB BLD-MCNC: 13 G/DL
IMM GRANULOCYTES # BLD AUTO: 0.02 K/UL
IMM GRANULOCYTES NFR BLD AUTO: 0.3 %
LDLC SERPL CALC-MCNC: 102.2 MG/DL
LYMPHOCYTES # BLD AUTO: 2.5 K/UL
LYMPHOCYTES NFR BLD: 33.7 %
MCH RBC QN AUTO: 29.1 PG
MCHC RBC AUTO-ENTMCNC: 32.2 G/DL
MCV RBC AUTO: 91 FL
MONOCYTES # BLD AUTO: 0.8 K/UL
MONOCYTES NFR BLD: 10.5 %
NEUTROPHILS # BLD AUTO: 3.5 K/UL
NEUTROPHILS NFR BLD: 47.8 %
NONHDLC SERPL-MCNC: 134 MG/DL
NRBC BLD-RTO: 0 /100 WBC
PLATELET # BLD AUTO: 186 K/UL
PMV BLD AUTO: 9.7 FL
POTASSIUM SERPL-SCNC: 4.7 MMOL/L
PROT SERPL-MCNC: 6.6 G/DL
RBC # BLD AUTO: 4.46 M/UL
SODIUM SERPL-SCNC: 140 MMOL/L
TRIGL SERPL-MCNC: 159 MG/DL
TSH SERPL DL<=0.005 MIU/L-ACNC: 2.46 UIU/ML
WBC # BLD AUTO: 7.31 K/UL

## 2018-04-02 PROCEDURE — 84443 ASSAY THYROID STIM HORMONE: CPT

## 2018-04-02 PROCEDURE — 80053 COMPREHEN METABOLIC PANEL: CPT

## 2018-04-02 PROCEDURE — 99395 PREV VISIT EST AGE 18-39: CPT | Mod: S$GLB,,, | Performed by: INTERNAL MEDICINE

## 2018-04-02 PROCEDURE — 80061 LIPID PANEL: CPT

## 2018-04-02 PROCEDURE — 99999 PR PBB SHADOW E&M-EST. PATIENT-LVL III: CPT | Mod: PBBFAC,,, | Performed by: INTERNAL MEDICINE

## 2018-04-02 PROCEDURE — 36415 COLL VENOUS BLD VENIPUNCTURE: CPT | Mod: PO

## 2018-04-02 PROCEDURE — 85025 COMPLETE CBC W/AUTO DIFF WBC: CPT

## 2018-04-02 NOTE — PROGRESS NOTES
Subjective:       Patient ID: Silke Fulton is a 37 y.o. female.    Chief Complaint: Annual PE  HPI   Ms Fulton is a 35 year old lady who presents to clinic today for an annual health examination.    Health Maintenance  CHol/lab:pending  C-scope:2007- normal , rec update 51yo  EGD: 2007- normal  WWE: Dr. Byers  Mammo: gyn  Eye exam: UTD-- 2/2018  DDS exam: UTD--3/2018    VACCINES:  Flu: declined  Shingles:  Pneumovax: declined  Prevnar:  declined  Tdap:       Review of Systems   Constitutional: Positive for fatigue. Negative for fever and unexpected weight change.        Fatigue w/ ADL's, usually 2/2 to HA's that wear pt down   HENT: Positive for ear pain, postnasal drip, rhinorrhea, sinus pain and sinus pressure. Negative for nosebleeds, sore throat and trouble swallowing.         Chronic allergies, one day w/ blood @ distal w/o free flowing bleed; worse this year   Eyes: Positive for photophobia and discharge. Negative for itching and visual disturbance.        Itchy , watery eyes that hurt w/ HA's   Respiratory: Positive for cough. Negative for chest tightness, shortness of breath and wheezing.         ENT felt 2/2 GERD, tx Zantac   Cardiovascular: Negative for chest pain and palpitations.   Gastrointestinal: Positive for abdominal pain, diarrhea and nausea. Negative for blood in stool, constipation and vomiting.        Abdominal pain- reflux, more annoying than painful,  tx Zantac ( Dr. Oliveros, ENT)   Endocrine: Positive for heat intolerance. Negative for polydipsia, polyphagia and polyuria.        Reported hx cold intolerance, but now more warm  Doesn't intrfere w/ADL's   Genitourinary: Negative for difficulty urinating, dysuria and hematuria.   Musculoskeletal: Negative for back pain, gait problem, myalgias, neck pain and neck stiffness.   Skin:        Left 2nd and 3rd distal digits still hyperemic, s/p Bactroban   Allergic/Immunologic: Negative for environmental allergies.   Neurological: Positive  for headaches. Negative for seizures.        THORPE's chronic, Dr. Gramajo, tx Emerge and Ketorlac  +/- control ( Treximet was helping, then formulary changed)       Objective:      Physical Exam   Constitutional: She is oriented to person, place, and time. She appears well-developed and well-nourished.   HENT:   Head: Normocephalic and atraumatic.   Right Ear: External ear normal.   Left Ear: External ear normal.   Nose: Nose normal.   Mouth/Throat: Oropharynx is clear and moist.   Eyes: Conjunctivae and EOM are normal. Pupils are equal, round, and reactive to light.   Neck: Normal range of motion. No thyromegaly present.   Cardiovascular: Normal rate, regular rhythm, normal heart sounds and intact distal pulses.    Pulmonary/Chest: Effort normal and breath sounds normal.   Abdominal: Soft. She exhibits no distension and no mass. There is tenderness. There is no rebound and no guarding. No hernia.   Tenderness to palpation in midepigastric region   Musculoskeletal: Normal range of motion.   Lymphadenopathy:     She has no cervical adenopathy.   Neurological: She is alert and oriented to person, place, and time.   Skin: Skin is warm and dry.   Left 3rd and 4th distal digits @ nail bed, hyperemic w/o tenderness or d/c   Psychiatric: She has a normal mood and affect. Her behavior is normal.       Assessment:       Annual physical  Chronic migraines, stable  Hx brain tumor  Gastritis, recent Rx Zantac week  Paronychia, left 3rd and 4th middle fingers  LE edema, w/up extensive and unrevealing        Plan:       Fasting lab  Continue present meds  Bactroban to left digits  Call prn  RTC

## 2018-04-03 ENCOUNTER — TELEPHONE (OUTPATIENT)
Dept: INTERNAL MEDICINE | Facility: CLINIC | Age: 38
End: 2018-04-03

## 2018-04-03 NOTE — TELEPHONE ENCOUNTER
----- Message from Alejandra Avelar MD sent at 4/2/2018  8:10 PM CDT -----  Please note that your thyroid was normal , as was your blood count.  Your chemistries were unremarkable, with normal sugar, kidney and liver levels  Your cholesterol is in the normal range, but the good ( HDL) is too low, exercise usually helps elevate the good cholesterol.  Please do not hesitate to call/email if you have any questions or concerns    Alejandra Gifford

## 2018-04-30 ENCOUNTER — OFFICE VISIT (OUTPATIENT)
Dept: NEUROLOGY | Facility: CLINIC | Age: 38
End: 2018-04-30
Payer: COMMERCIAL

## 2018-04-30 VITALS
SYSTOLIC BLOOD PRESSURE: 99 MMHG | WEIGHT: 192.69 LBS | BODY MASS INDEX: 35.46 KG/M2 | HEART RATE: 85 BPM | HEIGHT: 62 IN | DIASTOLIC BLOOD PRESSURE: 74 MMHG

## 2018-04-30 DIAGNOSIS — G44.86 CERVICOGENIC HEADACHE: ICD-10-CM

## 2018-04-30 DIAGNOSIS — M54.81 BILATERAL OCCIPITAL NEURALGIA: ICD-10-CM

## 2018-04-30 DIAGNOSIS — G43.711 CHRONIC MIGRAINE WITHOUT AURA, WITH INTRACTABLE MIGRAINE, SO STATED, WITH STATUS MIGRAINOSUS: Primary | ICD-10-CM

## 2018-04-30 PROCEDURE — 99214 OFFICE O/P EST MOD 30 MIN: CPT | Mod: S$GLB,,, | Performed by: PSYCHIATRY & NEUROLOGY

## 2018-04-30 PROCEDURE — 99999 PR PBB SHADOW E&M-EST. PATIENT-LVL III: CPT | Mod: PBBFAC,,, | Performed by: PSYCHIATRY & NEUROLOGY

## 2018-04-30 RX ORDER — PROPRANOLOL HYDROCHLORIDE 40 MG/1
40 TABLET ORAL 2 TIMES DAILY
Qty: 60 TABLET | Refills: 2 | Status: SHIPPED | OUTPATIENT
Start: 2018-04-30 | End: 2018-10-25 | Stop reason: SDUPTHER

## 2018-04-30 RX ORDER — GABAPENTIN 300 MG/1
600 CAPSULE ORAL NIGHTLY
Qty: 180 CAPSULE | Refills: 6 | Status: SHIPPED | OUTPATIENT
Start: 2018-04-30 | End: 2018-11-01 | Stop reason: SDUPTHER

## 2018-04-30 RX ORDER — NAPROXEN 500 MG/1
500 TABLET ORAL 2 TIMES DAILY PRN
Qty: 20 TABLET | Refills: 6 | Status: SHIPPED | OUTPATIENT
Start: 2018-04-30 | End: 2019-07-24 | Stop reason: SDUPTHER

## 2018-04-30 RX ORDER — SUMATRIPTAN SUCCINATE 100 MG/1
100 TABLET ORAL
Qty: 9 TABLET | Refills: 6 | Status: SHIPPED | OUTPATIENT
Start: 2018-04-30 | End: 2018-11-01 | Stop reason: SDUPTHER

## 2018-04-30 RX ORDER — NARATRIPTAN 2.5 MG/1
TABLET ORAL
Qty: 9 TABLET | Refills: 6 | Status: SHIPPED | OUTPATIENT
Start: 2018-04-30 | End: 2018-11-01

## 2018-04-30 RX ORDER — KETOROLAC TROMETHAMINE 10 MG/1
TABLET, FILM COATED ORAL
Qty: 20 TABLET | Refills: 6 | Status: SHIPPED | OUTPATIENT
Start: 2018-04-30 | End: 2018-11-01

## 2018-08-13 DIAGNOSIS — G43.711 CHRONIC MIGRAINE WITHOUT AURA, WITH INTRACTABLE MIGRAINE, SO STATED, WITH STATUS MIGRAINOSUS: ICD-10-CM

## 2018-08-14 RX ORDER — KETOROLAC TROMETHAMINE 10 MG/1
TABLET, FILM COATED ORAL
Qty: 20 TABLET | Refills: 4 | Status: SHIPPED | OUTPATIENT
Start: 2018-08-14 | End: 2018-11-01

## 2018-08-30 DIAGNOSIS — G43.711 CHRONIC MIGRAINE WITHOUT AURA, WITH INTRACTABLE MIGRAINE, SO STATED, WITH STATUS MIGRAINOSUS: ICD-10-CM

## 2018-08-31 RX ORDER — NARATRIPTAN 2.5 MG/1
TABLET ORAL
Qty: 9 TABLET | Refills: 1 | Status: SHIPPED | OUTPATIENT
Start: 2018-08-31 | End: 2018-11-01

## 2018-09-11 DIAGNOSIS — G43.711 CHRONIC MIGRAINE WITHOUT AURA, WITH INTRACTABLE MIGRAINE, SO STATED, WITH STATUS MIGRAINOSUS: ICD-10-CM

## 2018-09-11 DIAGNOSIS — G44.86 CERVICOGENIC HEADACHE: ICD-10-CM

## 2018-09-12 RX ORDER — METHOCARBAMOL 500 MG/1
500 TABLET, FILM COATED ORAL NIGHTLY
Qty: 30 TABLET | Refills: 6 | Status: SHIPPED | OUTPATIENT
Start: 2018-09-12 | End: 2018-10-10 | Stop reason: SDUPTHER

## 2018-10-10 DIAGNOSIS — G43.711 CHRONIC MIGRAINE WITHOUT AURA, WITH INTRACTABLE MIGRAINE, SO STATED, WITH STATUS MIGRAINOSUS: ICD-10-CM

## 2018-10-10 DIAGNOSIS — G44.86 CERVICOGENIC HEADACHE: ICD-10-CM

## 2018-10-10 RX ORDER — METHOCARBAMOL 500 MG/1
500 TABLET, FILM COATED ORAL NIGHTLY
Qty: 30 TABLET | Refills: 6 | Status: SHIPPED | OUTPATIENT
Start: 2018-10-10 | End: 2018-11-01 | Stop reason: SDUPTHER

## 2018-10-10 NOTE — TELEPHONE ENCOUNTER
----- Message from Sabi Nguyen sent at 10/10/2018  2:53 PM CDT -----  Contact: Self      Can the clinic reply in [facility]: Self      Please refill the medication(s) listed below. Please call the patient when the prescription(s) is ready for  at this phone number  [phone]. Pt states [facility] does not have medication. Pt needs the prescription sent to another pharmacy.      Medication #1 methocarbamol (ROBAXIN) 500 MG Tab        Preferred Pharmacy:  [facility] at [phone]

## 2018-10-25 DIAGNOSIS — G43.711 CHRONIC MIGRAINE WITHOUT AURA, WITH INTRACTABLE MIGRAINE, SO STATED, WITH STATUS MIGRAINOSUS: ICD-10-CM

## 2018-10-25 RX ORDER — PROPRANOLOL HYDROCHLORIDE 40 MG/1
40 TABLET ORAL 2 TIMES DAILY
Qty: 60 TABLET | Refills: 6 | Status: SHIPPED | OUTPATIENT
Start: 2018-10-25 | End: 2018-11-01 | Stop reason: SDUPTHER

## 2018-10-25 NOTE — PROGRESS NOTES
HPI     DLS: 01/08/2016  History of 4th nerve palsy       Last edited by Jeanie Ghosh on 2/2/2017  9:06 AM.         Assessment /Plan     For exam results, see Encounter Report.    Astigmatism of eye, bilateral    History of strabismus surgery    Fourth nerve palsy of left eye      Fourth nerve palsy of left eye  History of strabismus surgery  Pt reports surgery when she was 11 years old  Astigmatism of eye, bilateral  No change in specs at this time, monitor        Good internal ocular health today     RTC 1 year, sooner PRN                6

## 2018-10-26 DIAGNOSIS — Z12.39 BREAST CANCER SCREENING: ICD-10-CM

## 2018-11-01 ENCOUNTER — OFFICE VISIT (OUTPATIENT)
Dept: NEUROLOGY | Facility: CLINIC | Age: 38
End: 2018-11-01
Payer: COMMERCIAL

## 2018-11-01 VITALS
WEIGHT: 198.63 LBS | BODY MASS INDEX: 36.55 KG/M2 | HEIGHT: 62 IN | HEART RATE: 65 BPM | DIASTOLIC BLOOD PRESSURE: 55 MMHG | SYSTOLIC BLOOD PRESSURE: 97 MMHG

## 2018-11-01 DIAGNOSIS — G44.86 CERVICOGENIC HEADACHE: ICD-10-CM

## 2018-11-01 DIAGNOSIS — G43.711 CHRONIC MIGRAINE WITHOUT AURA, WITH INTRACTABLE MIGRAINE, SO STATED, WITH STATUS MIGRAINOSUS: ICD-10-CM

## 2018-11-01 DIAGNOSIS — M54.81 BILATERAL OCCIPITAL NEURALGIA: ICD-10-CM

## 2018-11-01 PROCEDURE — 99214 OFFICE O/P EST MOD 30 MIN: CPT | Mod: S$GLB,,, | Performed by: PSYCHIATRY & NEUROLOGY

## 2018-11-01 PROCEDURE — 99999 PR PBB SHADOW E&M-EST. PATIENT-LVL III: CPT | Mod: PBBFAC,,, | Performed by: PSYCHIATRY & NEUROLOGY

## 2018-11-01 PROCEDURE — 3008F BODY MASS INDEX DOCD: CPT | Mod: CPTII,S$GLB,, | Performed by: PSYCHIATRY & NEUROLOGY

## 2018-11-01 RX ORDER — SUMATRIPTAN SUCCINATE 100 MG/1
100 TABLET ORAL
Qty: 9 TABLET | Refills: 6 | Status: SHIPPED | OUTPATIENT
Start: 2018-11-01 | End: 2019-09-06 | Stop reason: SDUPTHER

## 2018-11-01 RX ORDER — KETOROLAC TROMETHAMINE 10 MG/1
TABLET, FILM COATED ORAL
Qty: 20 TABLET | Refills: 6 | Status: SHIPPED | OUTPATIENT
Start: 2018-11-01 | End: 2019-09-06 | Stop reason: SDUPTHER

## 2018-11-01 RX ORDER — NARATRIPTAN 2.5 MG/1
TABLET ORAL
Qty: 9 TABLET | Refills: 6 | Status: SHIPPED | OUTPATIENT
Start: 2018-11-01 | End: 2019-09-06 | Stop reason: SDUPTHER

## 2018-11-01 RX ORDER — GABAPENTIN 300 MG/1
600 CAPSULE ORAL NIGHTLY
Qty: 180 CAPSULE | Refills: 6 | Status: SHIPPED | OUTPATIENT
Start: 2018-11-01 | End: 2019-09-06 | Stop reason: SDUPTHER

## 2018-11-01 RX ORDER — METHOCARBAMOL 500 MG/1
500 TABLET, FILM COATED ORAL NIGHTLY
Qty: 30 TABLET | Refills: 6 | Status: SHIPPED | OUTPATIENT
Start: 2018-11-01 | End: 2018-12-17 | Stop reason: SDUPTHER

## 2018-11-01 RX ORDER — PROPRANOLOL HYDROCHLORIDE 40 MG/1
40 TABLET ORAL 2 TIMES DAILY
Qty: 60 TABLET | Refills: 6 | Status: SHIPPED | OUTPATIENT
Start: 2018-11-01 | End: 2019-09-06 | Stop reason: SDUPTHER

## 2018-11-01 NOTE — PROGRESS NOTES
Subjective:       Patient ID: Silke Fulton is a 38 y.o. female.    Reason for Consult: Migraine      Interval History:  Silke Fulton is here for follow up. Their condition is clinically stable.  She notes that she is having less than 6 days of headache per month.  We have discussed the complicated regimen that she takes to stop her headaches.  She knows not to take multiple powerful and said that the same time to reduce the risk of any sort of kidney failure.  She notes that overall she is pleased with the control of her headaches.      Objective:     Vitals:    11/01/18 1527   BP: (!) 97/55   Pulse: 65     Patient is awake alert oriented to person place and time.  Moves all 4 extremities against gravity.  Gait and station within normal limits.  Cranial nerves 2-12 without focal deficit.  Limited range of motion especially on flexion and lateral rotation.  Tinel sign negative at bilateral greater and lesser occipital nerves on today's visit.  Focused examination was undertaken today. Over 50% of face to face time of 25 minute visit time was in giving guidance, counseling and discussing treatment options.    Assessment/Plan:     Problem List Items Addressed This Visit        Neuro    Chronic migraine without aura, with intractable migraine, so stated, with status migrainosus    Overview     Preventive - gabapentin QHS, robaxin QHS, propranolol BID  Rescue - Imitrex/Naproxen, Amerge, Ketorolac  Triggers - weather, humidity, stress, hormones    Once a week, can last 3 days, less than 6 days a month    Aimovig discussed, but deferred         Relevant Medications    sumatriptan (IMITREX) 100 MG tablet    propranolol (INDERAL) 40 MG tablet    naratriptan (AMERGE) 2.5 MG tablet    methocarbamol (ROBAXIN) 500 MG Tab    ketorolac (TORADOL) 10 mg tablet    gabapentin (NEURONTIN) 300 MG capsule    Cervicogenic headache    Relevant Medications    methocarbamol (ROBAXIN) 500 MG Tab       Orthopedic     Occipital neuralgia    Relevant Medications    gabapentin (NEURONTIN) 300 MG capsule        38-year-old female presents for evaluation and follow-up of migraine headaches complicated by cervicogenic headache and occipital neuralgia.  For now I will refill her medications as above.  We have spoken about Aimovig but the patient defers that option for now.  I have explained to her that if we are able to keep good control of her headaches we may consider weaning her off 1 of her preventive agents at next visit.  I will follow up with them in 6 month(s).  The patient verbalizes understanding and agreement with the treatment plan. I have discussed risks, benefits and alternatives to the treatment plan. Questions were sought and answered to her stated verbal satisfaction.        Lakhwinder Luther MD    This note is dictated on M*Modal Fluency Direct word recognition program. There are word recognition mistakes that are occasionally missed on review.

## 2018-12-17 ENCOUNTER — OFFICE VISIT (OUTPATIENT)
Dept: INTERNAL MEDICINE | Facility: CLINIC | Age: 38
End: 2018-12-17
Payer: COMMERCIAL

## 2018-12-17 VITALS
TEMPERATURE: 100 F | BODY MASS INDEX: 36.15 KG/M2 | SYSTOLIC BLOOD PRESSURE: 110 MMHG | HEART RATE: 87 BPM | OXYGEN SATURATION: 98 % | RESPIRATION RATE: 18 BRPM | HEIGHT: 62 IN | WEIGHT: 196.44 LBS | DIASTOLIC BLOOD PRESSURE: 72 MMHG

## 2018-12-17 DIAGNOSIS — G43.711 CHRONIC MIGRAINE WITHOUT AURA, WITH INTRACTABLE MIGRAINE, SO STATED, WITH STATUS MIGRAINOSUS: ICD-10-CM

## 2018-12-17 DIAGNOSIS — G44.86 CERVICOGENIC HEADACHE: ICD-10-CM

## 2018-12-17 DIAGNOSIS — J20.8 VIRAL BRONCHITIS: Primary | ICD-10-CM

## 2018-12-17 PROCEDURE — 99999 PR PBB SHADOW E&M-EST. PATIENT-LVL IV: CPT | Mod: PBBFAC,,, | Performed by: STUDENT IN AN ORGANIZED HEALTH CARE EDUCATION/TRAINING PROGRAM

## 2018-12-17 PROCEDURE — 99213 OFFICE O/P EST LOW 20 MIN: CPT | Mod: S$GLB,,, | Performed by: STUDENT IN AN ORGANIZED HEALTH CARE EDUCATION/TRAINING PROGRAM

## 2018-12-17 PROCEDURE — 3008F BODY MASS INDEX DOCD: CPT | Mod: CPTII,S$GLB,, | Performed by: STUDENT IN AN ORGANIZED HEALTH CARE EDUCATION/TRAINING PROGRAM

## 2018-12-17 RX ORDER — SODIUM FLUORIDE 1.1 G/100G
CREAM ORAL
Refills: 2 | COMMUNITY
Start: 2018-10-20 | End: 2023-02-13

## 2018-12-17 RX ORDER — METHOCARBAMOL 500 MG/1
500 TABLET, FILM COATED ORAL NIGHTLY
Qty: 30 TABLET | Refills: 1 | Status: SHIPPED | OUTPATIENT
Start: 2018-12-17 | End: 2019-09-06

## 2018-12-17 RX ORDER — BENZONATATE 100 MG/1
100 CAPSULE ORAL 3 TIMES DAILY PRN
Qty: 30 CAPSULE | Refills: 0 | Status: SHIPPED | OUTPATIENT
Start: 2018-12-17 | End: 2018-12-27

## 2018-12-17 NOTE — PROGRESS NOTES
I have interviewed and examined the patient w/ the resident,   I agree w/ the impression and plan as outlined above by her.  Alejandra Gifford MD- Staff

## 2018-12-17 NOTE — PROGRESS NOTES
Subjective:       Patient ID: Silke Fulton is a 38 y.o. female.    Chief Complaint: Cough; Sinus Problem; Sore Throat; Back Pain; Chest Congestion; and Otalgia    Ms. Silke Fulton is a 38 year old female with chronic migraine headaches who presents for evaluation of cough.     Ms. Fulton recently went to [place] with her  and just returned back home last night. She reports cough starting about 4 days ago, which has been nonproductive. 2 days ago, she also developed sore throat, malaise, and rhinorrhea. She has not taken her temperature (lost her thermometer) but has not noticed any significant fevers or chills. She has not had any sick contacts that she knows of. She reports some mild nausea but no vomiting and no diarrhea. She is eating and drinking well. She did not take a flu shot this year.      Review of Systems   Constitutional: Positive for fatigue. Negative for chills and fever.   HENT: Positive for rhinorrhea and sore throat.    Eyes: Negative for pain and redness.   Respiratory: Positive for cough. Negative for shortness of breath.    Cardiovascular: Negative for chest pain, palpitations and leg swelling.   Gastrointestinal: Negative for abdominal pain, constipation, diarrhea, nausea and vomiting.   Endocrine: Negative for polydipsia and polyuria.   Genitourinary: Negative for dysuria and hematuria.   Musculoskeletal: Negative for back pain and neck pain.   Skin: Negative for color change and rash.   Neurological: Positive for headaches. Negative for syncope and light-headedness.   Hematological: Negative for adenopathy. Does not bruise/bleed easily.   Psychiatric/Behavioral: Negative for confusion. The patient is not nervous/anxious.        Objective:      Physical Exam   Constitutional: She is oriented to person, place, and time. She appears well-developed and well-nourished. No distress.   HENT:   Head: Normocephalic and atraumatic.   Bilateral TMs clear without injection or  dullness. Oropharynx mildly injected, no exudates.    Eyes: Conjunctivae and EOM are normal. Pupils are equal, round, and reactive to light. Right eye exhibits no discharge. Left eye exhibits no discharge. No scleral icterus.   Neck: Normal range of motion. Neck supple.   Cardiovascular: Normal rate, regular rhythm and normal heart sounds. Exam reveals no gallop and no friction rub.   No murmur heard.  Pulmonary/Chest: Effort normal and breath sounds normal. No respiratory distress. She has no wheezes. She has no rales.   Abdominal: Soft. Bowel sounds are normal. She exhibits no distension. There is no tenderness. There is no guarding.   Musculoskeletal: Normal range of motion. She exhibits no edema or tenderness.   Neurological: She is alert and oriented to person, place, and time.   Skin: Skin is warm and dry. No rash noted. She is not diaphoretic.   Psychiatric: She has a normal mood and affect. Her behavior is normal.       Assessment:       1. Viral bronchitis        Plan:       1. Instructed patient to drink plenty of fluids and rest. Prescribed tessalon to assist with cough. Work note given with return date 12/19.     Return to clinic PRN    Zayda Gonzalez, DO  Internal Medicine, PGY-2

## 2018-12-17 NOTE — LETTER
December 17, 2018                 [facility]                                                                                                                                                       Internal Medicine  [address] [facility]  [place] [address]  Phone: [phone]  Fax: [phone]   December 17, 2018     Patient: Silke Fulton   YOB: 1980   Date of Visit: 12/17/2018       To Whom it May Concern:    Silke Fulton was seen in my clinic on 12/17/2018. She may return to work on 12/19/2018.    If you have any questions or concerns, please don't hesitate to call.    Sincerely,         Zayda Gonzalez, DO

## 2018-12-26 ENCOUNTER — HOSPITAL ENCOUNTER (OUTPATIENT)
Dept: RADIOLOGY | Facility: OTHER | Age: 38
Discharge: HOME | End: 2018-12-26
Attending: INTERNAL MEDICINE
Payer: COMMERCIAL

## 2018-12-26 ENCOUNTER — OFFICE VISIT (OUTPATIENT)
Dept: OBSTETRICS AND GYNECOLOGY | Facility: CLINIC | Age: 38
End: 2018-12-26
Payer: COMMERCIAL

## 2018-12-26 ENCOUNTER — TELEPHONE (OUTPATIENT)
Dept: INTERNAL MEDICINE | Facility: CLINIC | Age: 38
End: 2018-12-26

## 2018-12-26 VITALS
BODY MASS INDEX: 35.86 KG/M2 | DIASTOLIC BLOOD PRESSURE: 70 MMHG | WEIGHT: 194.88 LBS | HEIGHT: 62 IN | SYSTOLIC BLOOD PRESSURE: 98 MMHG

## 2018-12-26 VITALS — HEIGHT: 62 IN | BODY MASS INDEX: 35.7 KG/M2 | WEIGHT: 194 LBS

## 2018-12-26 DIAGNOSIS — Z12.39 BREAST CANCER SCREENING: ICD-10-CM

## 2018-12-26 DIAGNOSIS — Z01.419 VISIT FOR GYNECOLOGIC EXAMINATION: Primary | ICD-10-CM

## 2018-12-26 PROCEDURE — 77067 SCR MAMMO BI INCL CAD: CPT | Mod: 26,,, | Performed by: RADIOLOGY

## 2018-12-26 PROCEDURE — 99396 PREV VISIT EST AGE 40-64: CPT | Mod: S$GLB,,, | Performed by: OBSTETRICS & GYNECOLOGY

## 2018-12-26 PROCEDURE — 99999 PR PBB SHADOW E&M-EST. PATIENT-LVL II: CPT | Mod: PBBFAC,,, | Performed by: OBSTETRICS & GYNECOLOGY

## 2018-12-26 PROCEDURE — 77063 BREAST TOMOSYNTHESIS BI: CPT | Mod: TC

## 2018-12-26 PROCEDURE — 77067 SCR MAMMO BI INCL CAD: CPT | Mod: TC

## 2018-12-26 PROCEDURE — 77063 BREAST TOMOSYNTHESIS BI: CPT | Mod: 26,,, | Performed by: RADIOLOGY

## 2018-12-26 NOTE — PROGRESS NOTES
"HISTORY OF PRESENT ILLNESS:    Silke Fulton is a 38 y.o. female, , Patient's last menstrual period was 2018 (exact date).,  presents for a routine exam and has no complaints. PAP DUE , REF MAMMO BY HER PCP, WILL SCHEDULE.  FLU VACCINE REF.  SOME NILESH SX  We reviewed the role of bladder training,  timed voiding, Kegel exercises, and the avoidance of bladder irritants in managing mild symptoms conservatively.  NO GYN C/O    LAST VISIT :  PAP DUE AND SUBMITTED.  HAS SOME RLQ PAIN, INTERMITTENT OVER THE PAST FEW MONTHS, NOT ASSOCIATED WITH CYCLE.   POSSIBLY ASSOCIATED GI SYMPTOMS/ PROBLEMS.  REASSURED NORMAL EXAM.  DECLINES CONTRACEPTION  LAST VISIT :   PAP DUE .  SISTER WITH LOSS - FAMILY WITH ?TRISOMY 13 TRANSLOCATION - (PT SAYS THAT 13 AND 14TH CHROMOSOMES CONNECTED) - WILL CHECK PT'S CHROMOSOMES NOW.  NOT CONTRACEPTING BUT NOT ACTIVELY TRYING.    LAST VISIT 2015:  LAST PAP , DISCUSSED SCREENING RECOMMENDATIONS. PREFERS PAP THIS YEAR AND THEN WILL FOLLOW RECS. STOPPED OCP LAST YEAR (HAD SOME PEDAL EDEMA LAST YEAR). DECLINES PROGESTERONE-ONLY CONTRACEPTION AND "WAIT AND SEE." - ADVISED PNV. IS CONSIDERING ACTIVELY PURSUING PREGNANCY AND WILL REF TO  FIRST FOR EVALUATION.  LLQ PAIN X 1 WEEK IN July, PRECEEDED MENSES.    Past Medical History:   Diagnosis Date    Acute gastritis without mention of hemorrhage     Esophageal reflux     Meningitis     staph aureus    Migraine without aura, without mention of intractable migraine without mention of status migrainosus     Personal history of malignant neoplasm of brain     cerebellar astrocytoma    Tarsal tunnel syndrome        Past Surgical History:   Procedure Laterality Date    cerebellar astrocytoma      eye-superior oblique palsy      right mastoidectomy         MEDICATIONS AND ALLERGIES:      Current Outpatient Medications:     benzonatate (TESSALON) 100 MG capsule, Take 1 capsule (100 mg total) by mouth 3 " (three) times daily as needed for Cough., Disp: 30 capsule, Rfl: 0    DENTA 5000 PLUS 1.1 % Crea, BRUSH TEETH FOR 2 MINUTES AT BEDTIME. EXPECTORATE., Disp: , Rfl: 2    gabapentin (NEURONTIN) 300 MG capsule, Take 2 capsules (600 mg total) by mouth every evening., Disp: 180 capsule, Rfl: 6    ketorolac (TORADOL) 10 mg tablet, 1 pill every 8 hours as needed for headache, not to be used more than 3x in week, Disp: 20 tablet, Rfl: 6    magnesium 250 mg Tab, Take 500 mg by mouth once daily. , Disp: , Rfl:     methocarbamol (ROBAXIN) 500 MG Tab, TAKE 1 TABLET (500 MG TOTAL) BY MOUTH EVERY EVENING., Disp: 30 tablet, Rfl: 1    multivitamin (THERAGRAN) per tablet, Take 1 tablet by mouth once daily.  , Disp: , Rfl:     naproxen (EC NAPROSYN) 500 MG EC tablet, Take 1 tablet (500 mg total) by mouth 2 (two) times daily as needed (headache)., Disp: 20 tablet, Rfl: 6    naratriptan (AMERGE) 2.5 MG tablet, TAKE 1 TABLET BY MOUTH ONSET OF HEADACHE MAY REPEAT IN 4 HRS IF NEEDED MAX 2 TABS/3DAYS USE IN WEEK, Disp: 9 tablet, Rfl: 6    polycarbophil (FIBERCON) 625 mg tablet, Take 625 mg by mouth once daily., Disp: , Rfl:     propranolol (INDERAL) 40 MG tablet, Take 1 tablet (40 mg total) by mouth 2 (two) times daily., Disp: 60 tablet, Rfl: 6    sumatriptan (IMITREX) 100 MG tablet, Take 1 tablet (100 mg total) by mouth every 2 (two) hours as needed for Migraine., Disp: 9 tablet, Rfl: 6    Review of patient's allergies indicates:   Allergen Reactions    Septra [sulfamethoxazole-trimethoprim] Other (See Comments)    Sulfa (sulfonamide antibiotics)      Bone marrow suppression         Family History   Problem Relation Age of Onset    Breast cancer Maternal Aunt     Migraines Mother     Arrhythmia Mother     Asthma Mother     Asthma Brother     Heart failure Maternal Grandmother     Hyperlipidemia Maternal Grandmother     Hypertension Maternal Grandmother     Osteoarthritis Maternal Grandmother     Alzheimer's disease  Maternal Grandmother     Hypertension Maternal Grandfather     Osteoarthritis Maternal Grandfather     Alzheimer's disease Maternal Grandfather     Colon cancer Neg Hx        Social History     Socioeconomic History    Marital status:      Spouse name: Not on file    Number of children: 0    Years of education: 20    Highest education level: Not on file   Social Needs    Financial resource strain: Not on file    Food insecurity - worry: Not on file    Food insecurity - inability: Not on file    Transportation needs - medical: Not on file    Transportation needs - non-medical: Not on file   Occupational History    Occupation: Call Center Counsellor     Employer: [employer]   Tobacco Use    Smoking status: Never Smoker    Smokeless tobacco: Never Used   Substance and Sexual Activity    Alcohol use: Yes     Alcohol/week: 1.2 oz     Types: 2 Standard drinks or equivalent per week     Comment: socially    Drug use: No    Sexual activity: Yes     Partners: Male     Birth control/protection: None   Other Topics Concern    Not on file   Social History Narrative           COMPREHENSIVE GYN HISTORY:  PAP History: Denies abnormal Paps.  Infection History: Denies STDs. Denies PID.  Benign History: Denies uterine fibroids. Denies ovarian cysts. Denies endometriosis. Denies other conditions.  Cancer History: Denies cervical cancer. Denies uterine cancer or hyperplasia. Denies ovarian cancer. Denies vulvar cancer or pre-cancer. Denies vaginal cancer or pre-cancer. Denies breast cancer. Denies colon cancer.  Sexual Activity History: Reports currently being sexually active  Menstrual History: Monthly, mild-moderate.      ROS:  GENERAL: No weight changes. No swelling. No fatigue. No fever.  CARDIOVASCULAR: No chest pain. No shortness of breath. No leg cramps.   NEUROLOGICAL: No headaches. No vision changes.  BREASTS: No pain. No lumps. No discharge.  ABDOMEN: No pain. No nausea. No vomiting. No  "diarrhea. No constipation.  REPRODUCTIVE: No abnormal bleeding.   VULVA: No pain. No lesions. No itching.  VAGINA: No relaxation. No itching. No odor. No discharge. No lesions.  URINARY: No incontinence. No nocturia. No frequency. No dysuria.    BP 98/70   Ht 5' 2" (1.575 m)   Wt 88.4 kg (194 lb 14.2 oz)   LMP 12/17/2018 (Exact Date)   BMI 35.65 kg/m²     PE:  APPEARANCE: Well nourished, well developed, in no acute distress.  AFFECT: WNL, alert and oriented x 3.  SKIN: No acne or hirsutism.  NECK: Neck symmetric, without masses or thyromegaly.  NODES: No inguinal, cervical, axillary or femoral lymph node enlargement.  CHEST: Good respiratory effort.   ABDOMEN: Soft. No tenderness or masses. No hepatosplenomegaly. No hernias.  BREASTS: Symmetrical, no skin changes, visible lesions, palpable masses or nipple discharge bilaterally.  PELVIC: External female genitalia without lesions.  Female hair distribution. Adequate perineal body, Normal urethral meatus. Vagina moist and well rugated without lesions or discharge.  No significant cystocele or rectocele present. Cervix pink without lesions, discharge or tenderness. Uterus is normal size, regular, mobile and nontender. Adnexa without masses or tenderness.  EXTREMITIES: No edema    PROCEDURES:      DIAGNOSIS:  1. Visit for gynecologic examination         LABS AND TESTS ORDERED:    MEDICATIONS PRESCRIBED:    COUNSELING:   The patient was counseled today on ACS PAP guidelines, with recommendations for yearly pelvic exams unless their uterus, cervix, and ovaries were removed for benign reasons; in that case, examinations every 3-5 years are recommended.  The patient was also counseled regarding monthly breast self-examination, routine STD screening for at-risk populations, prophylactic immunizations for transmitted infections such as  HPV, Pertussis, or Influenza as appropriate, and yearly mammograms when indicated by ACS guidelines.  She was advised to see her primary " care physician for all other health maintenance.    FOLLOW-UP with me annually.

## 2018-12-26 NOTE — TELEPHONE ENCOUNTER
----- Message from Alejandra Avelar MD sent at 12/26/2018  3:40 PM CST -----  Please note that your mammogram was read as follows  Impression:  There is no mammographic evidence of malignancy.  Alejandra Gifford

## 2019-02-08 ENCOUNTER — OFFICE VISIT (OUTPATIENT)
Dept: OPTOMETRY | Facility: CLINIC | Age: 39
End: 2019-02-08
Payer: COMMERCIAL

## 2019-02-08 DIAGNOSIS — Z85.841 PERSONAL HISTORY OF MALIGNANT NEOPLASM OF BRAIN: ICD-10-CM

## 2019-02-08 DIAGNOSIS — Z98.890 HISTORY OF STRABISMUS SURGERY: ICD-10-CM

## 2019-02-08 DIAGNOSIS — H49.12 FOURTH NERVE PALSY OF LEFT EYE: ICD-10-CM

## 2019-02-08 DIAGNOSIS — H52.203 ASTIGMATISM OF BOTH EYES, UNSPECIFIED TYPE: Primary | ICD-10-CM

## 2019-02-08 PROCEDURE — 92014 PR EYE EXAM, EST PATIENT,COMPREHESV: ICD-10-PCS | Mod: S$GLB,,, | Performed by: OPTOMETRIST

## 2019-02-08 PROCEDURE — 92015 PR REFRACTION: ICD-10-PCS | Mod: S$GLB,,, | Performed by: OPTOMETRIST

## 2019-02-08 PROCEDURE — 99999 PR PBB SHADOW E&M-EST. PATIENT-LVL II: ICD-10-PCS | Mod: PBBFAC,,, | Performed by: OPTOMETRIST

## 2019-02-08 PROCEDURE — 99999 PR PBB SHADOW E&M-EST. PATIENT-LVL II: CPT | Mod: PBBFAC,,, | Performed by: OPTOMETRIST

## 2019-02-08 PROCEDURE — 92015 DETERMINE REFRACTIVE STATE: CPT | Mod: S$GLB,,, | Performed by: OPTOMETRIST

## 2019-02-08 PROCEDURE — 92014 COMPRE OPH EXAM EST PT 1/>: CPT | Mod: S$GLB,,, | Performed by: OPTOMETRIST

## 2019-02-08 NOTE — PROGRESS NOTES
HPI     Patient states that vision seems about the same as last visit in both   eyes.    Fourth nerve palsy of left eye  History of strabismus surgery  Astigmatism of both eyes, unspecified type  Fourth nerve palsy of left eye  History of strabismus surgery  Astigmatism of eye, bilateral    No gtts    Last edited by Avi Fried on 2/8/2019  3:48 PM. (History)            Assessment /Plan     For exam results, see Encounter Report.    Astigmatism of both eyes, unspecified type   Rx specs     Personal history of malignant neoplasm of brain    Fourth nerve palsy of left eye  History of strabismus surgery  Pt reports surgery when she was 11 years old with Dr. Momo Lamb internal ocular health today     RTC 1 year, sooner PRN

## 2019-02-18 NOTE — PROGRESS NOTES
Masseter Units: 0 Subjective:       Patient ID: Silke Fulton is a 37 y.o. female.    Reason for Consult: Headache      Interval History:  Silke Fulton is here for follow up. Their condition is relatively stable.  She notes that her headaches only occur a few times a week.  She notes that her headaches are less than 6 in a month.  She notes that she has had this could control with relation to her multiple preventive medications.  She notes that she had a very stressful event where her dog and her cat  since January and she has been dealing with the emotional follow-up that.  She notes that despite this her number of headaches has been relatively steady.  She has a complex rescue medication regimen for her headaches but she notes that it is able to stop her headaches are lasting more than 24 hours which is how long her headaches used to happen.  We have reviewed her brain imaging at length today.  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     Objective:     Vitals:    18 1208   BP: 99/74   Pulse: 85     Patient is awake, alert and oriented to person, place and time. Moves all extremities antigravity. Cranial Nerves 2-12 without focal deficit. Gait and station normal.  Limited flexion and extension and cervical range of motion, this is baseline for her.  Focused examination was undertaken today. Over 50% of face to face time of 25 minute visit time was in giving guidance, counseling and discussing treatment options.    Results for orders placed or performed during the hospital encounter of 17   MRI Brain W WO Contrast    Narrative    Procedure: MRI the brain with andwithout contrast.    Technique: Sagittal and axial T1, axial T2, axial FLAIR, axial gradient, axial diffusion, and axial, sagittal, and coronal postcontrast T1 images of the whole brain. 8  ml of Gadavist injected intravenously.         Comparison:  09/15/2015    Findings: There is remote operative change from suboccipital craniotomy for parasagittal posterior fossa lesion resection there is continued encephalomalacia of the cerebellum with subtle margin of T2 flair signal hyperintensity in the adjacent parenchyma suggestive for gliosis and scarring. There is no evidence for new prior focal signal abnormality or enhancement to suggest worsening residual or recurrent lesion.    The ventricles are stable without hydrocephalus. There is mild prominence of the posterior horn the lateral ventricles symmetrically which may be sequela of prior hydrocephalus.    Punctate foci susceptibility posterior fossa suggestive for remote blood products primarily along the margin of the fourth ventricular resection cavity. Measured cranial T2 flow voids are present.    Impression     Remote operative change from suboccipital craniotomy with continued right cerebellar encephalomalacia. Continued and relatively stable subtle flair hyperintensity margin of the cerebellum suggestive for gliosis and scarring. No evidence for new signal abnormality or enhancement to suggest residual or recurrent mass.    Stable configuration of ventricles with mild prominence of the posterior horns lateral ventricles and moderate prominence of the fourth ventricle.    Please note there is mild mucosal thickening and probable small aerated secretions within the left maxillary antra cannot exclude component of acute sinusitis.        .      Electronically signed by: SHELBY CUBA DO  Date:     12/22/17  Time:    12:17    Results for orders placed or performed during the hospital encounter of 08/04/14   CT Head Without Contrast    Narrative    Comparison is made to a cranial CT exam dated 3/15/04, as well as to a more recent cranial MR exam of 12/10/07.    Postoperative changes are again identified in the posterior fossa, with an occipital calvarial defect observed and with a sizeable area of  Price (Use Numbers Only, No Special Characters Or $): 12.00 Glabellar Complex Units: 20 hypoattenuation representing local porencephaly/gliotic scarring seen within the superomedial aspect of the right   cerebellar hemisphere/superior cerebellar vermis.  This area of encephalomalacia is unchanged from the examinations noted above.  There is some associated prominence of the CSF spaces over the right cerebellar hemisphere, as before.  There is moderate   enlargement of both lateral ventricles and enlargement of the fourth ventricle identified, but the size of the ventricular system is unchanged from the previous examinations, and the ventricles are normal in position without midline shift.  Well defined,   normal appearing sulci are identified over both cerebral convexities.  Parenchymal brain attenuation demonstrates no detrimental interval change since the 2004 CT exam.  No evidence of recent intracranial hemorrhage.  No new areas of hypoattenuation   indicating recent cerebral ischemia/infarction.  No subdural collections.  No findings on the noncontrast cranial CT to specifically suggest recurrent neoplasm.  Incidental note is made of mucosal thickening in the posterior ethmoid air cells on the   right side.    Impression     Noncontrast cranial CT examination demonstrates postoperative changes related to the posterior fossa as discussed above.  There has been no significant detrimental interval change since the cranial CT of 3/15/04.           Electronically signed by: Avi Rivas MD  Date:     08/05/14  Time:    05:26        Assessment/Plan:     Problem List Items Addressed This Visit        Neuro    Chronic migraine without aura, with intractable migraine, so stated, with status migrainosus - Primary    Overview     Preventive - gabapentin QHS, robaxin QHS, propranolol BID  Rescue - Imitrex/Naproxen, Amerge, Ketorolac  Triggers - weather, humidity, stress, hormones    Once a week, can last 3 days, less than 6 days a month         Relevant Medications    sumatriptan (IMITREX) 100 MG tablet     Forehead Units: 12 propranolol (INDERAL) 40 MG tablet    gabapentin (NEURONTIN) 300 MG capsule    naproxen (EC NAPROSYN) 500 MG EC tablet    naratriptan (AMERGE) 2.5 MG tablet    ketorolac (TORADOL) 10 mg tablet    Cervicogenic headache    Relevant Medications    naproxen (EC NAPROSYN) 500 MG EC tablet       Orthopedic    Occipital neuralgia    Relevant Medications    gabapentin (NEURONTIN) 300 MG capsule    naproxen (EC NAPROSYN) 500 MG EC tablet        37-year-old right-handed female presents for evaluation follow-up of complex multifactorial headaches.  At this point in time we have excellent control of her headaches.  She is having less than 6 days of headache a month.  For now we'll have her continue her headache regimen as outlined above.  I would consider weaning her medications in April 2019. We are both happy with her current level of headache control.  Should this worsen I would consider ultrasound-guided occipital nerve blocks.  I will follow up with them in 6 month(s).  The patient verbalizes understanding and agreement with the treatment plan. I have discussed risks, benefits and alternatives to the treatment plan. Questions were sought and answered to her stated verbal satisfaction.        Lakhwinder Luther MD    This note is dictated on Dragon Natural Speaking word recognition program. There are word recognition mistakes that are occasionally missed on review.         Lot #: [id number] Reconstitution Date (Optional): 02/18/2019 Dilution (U/0.1 Cc): 2.5 Post-Care Instructions: Patient instructed to not lie down for 4 hours and limit physical activity for 24 hours. Detail Level: Zone Expiration Date (Month Year): 04/2021 Consent: Written consent obtained. Risks include but not limited to lid/brow ptosis, bruising, swelling, diplopia, temporary effect, incomplete chemical denervation.

## 2019-03-24 DIAGNOSIS — G44.86 CERVICOGENIC HEADACHE: ICD-10-CM

## 2019-03-24 DIAGNOSIS — G43.711 CHRONIC MIGRAINE WITHOUT AURA, WITH INTRACTABLE MIGRAINE, SO STATED, WITH STATUS MIGRAINOSUS: ICD-10-CM

## 2019-03-25 RX ORDER — METHOCARBAMOL 500 MG/1
TABLET, FILM COATED ORAL
Qty: 30 TABLET | Refills: 3 | Status: SHIPPED | OUTPATIENT
Start: 2019-03-25 | End: 2019-07-24 | Stop reason: SDUPTHER

## 2019-05-15 ENCOUNTER — OFFICE VISIT (OUTPATIENT)
Dept: OTOLARYNGOLOGY | Facility: CLINIC | Age: 39
End: 2019-05-15
Payer: COMMERCIAL

## 2019-05-15 VITALS
HEIGHT: 62 IN | HEART RATE: 70 BPM | SYSTOLIC BLOOD PRESSURE: 109 MMHG | DIASTOLIC BLOOD PRESSURE: 74 MMHG | BODY MASS INDEX: 35.91 KG/M2 | WEIGHT: 195.13 LBS

## 2019-05-15 DIAGNOSIS — J31.0 CHRONIC RHINITIS: Primary | ICD-10-CM

## 2019-05-15 DIAGNOSIS — H65.197 OTHER RECURRENT ACUTE NONSUPPURATIVE OTITIS MEDIA, UNSPECIFIED LATERALITY: ICD-10-CM

## 2019-05-15 DIAGNOSIS — Z85.841 PERSONAL HISTORY OF MALIGNANT NEOPLASM OF BRAIN: ICD-10-CM

## 2019-05-15 DIAGNOSIS — J01.91 ACUTE RECURRENT SINUSITIS, UNSPECIFIED LOCATION: ICD-10-CM

## 2019-05-15 PROCEDURE — 99999 PR PBB SHADOW E&M-EST. PATIENT-LVL IV: ICD-10-PCS | Mod: PBBFAC,,, | Performed by: OTOLARYNGOLOGY

## 2019-05-15 PROCEDURE — 31231 NASAL/SINUS ENDOSCOPY: ICD-10-PCS | Mod: S$GLB,,, | Performed by: OTOLARYNGOLOGY

## 2019-05-15 PROCEDURE — 99203 OFFICE O/P NEW LOW 30 MIN: CPT | Mod: 25,S$GLB,, | Performed by: OTOLARYNGOLOGY

## 2019-05-15 PROCEDURE — 3008F PR BODY MASS INDEX (BMI) DOCUMENTED: ICD-10-PCS | Mod: CPTII,S$GLB,, | Performed by: OTOLARYNGOLOGY

## 2019-05-15 PROCEDURE — 99999 PR PBB SHADOW E&M-EST. PATIENT-LVL IV: CPT | Mod: PBBFAC,,, | Performed by: OTOLARYNGOLOGY

## 2019-05-15 PROCEDURE — 99203 PR OFFICE/OUTPT VISIT, NEW, LEVL III, 30-44 MIN: ICD-10-PCS | Mod: 25,S$GLB,, | Performed by: OTOLARYNGOLOGY

## 2019-05-15 PROCEDURE — 31231 NASAL ENDOSCOPY DX: CPT | Mod: S$GLB,,, | Performed by: OTOLARYNGOLOGY

## 2019-05-15 PROCEDURE — 3008F BODY MASS INDEX DOCD: CPT | Mod: CPTII,S$GLB,, | Performed by: OTOLARYNGOLOGY

## 2019-05-15 RX ORDER — FLUTICASONE PROPIONATE 50 MCG
2 SPRAY, SUSPENSION (ML) NASAL DAILY
Qty: 16 G | Refills: 2 | Status: SHIPPED | OUTPATIENT
Start: 2019-05-15 | End: 2019-08-13

## 2019-05-15 RX ORDER — LORATADINE 10 MG/1
10 TABLET ORAL DAILY
COMMUNITY
End: 2021-07-29

## 2019-05-15 NOTE — PROCEDURES
Nasal/sinus endoscopy  Date/Time: 5/15/2019 3:47 PM  Performed by: Kerwin Childers MD  Authorized by: Kerwin Childers MD     Consent Done?:  Yes (Verbal)  Anesthesia:     Local anesthetic:  Lidocaine 4% and Nomi-Synephrine 1/2%    Patient tolerance:  Patient tolerated the procedure well with no immediate complications  Nose:     Procedure Performed:  Nasal Endoscopy  External:      No external nasal deformity  Intranasal:      Mucosa no polyps     Mucosa ulcers not present     No mucosa lesions present     Turbinates not enlarged     No septum gross deformity  Nasopharynx:      No mucosa lesions     Adenoids not present     Posterior choanae patent     Eustachian tube patent     Mild diffuse edema and posterior nonpurulent mucus.

## 2019-05-15 NOTE — PROGRESS NOTES
Subjective:      Silke Fulton is a 38 y.o. female who was self-referred for sinusitis and ear infections.    She relates a lifelong history of recurrent sinus and ear infections, which have been managed by Dr. Lora until his [facility] last fall.  She now presents to establish ENT care, though is not having an acute issue today.  She describes episodes of facial pressure, nasal discharge and postnasal drip, which are recurrent a couple of times per year, and sometimes treated with antibiotics.  She notes mild baseline nasal congestion, but uses no sprays or medications.  She denies hyposmia, facial pressure, or cough.  She does describe some pruritic symptoms.  She also describes recurring fullness and pain in the ears, which is relieved by scraping them with a swab until they bleed.  She is sometimes given antibiotics for this.  Notably, she had a right-sided mastoidectomy by Dr. Lora as a child.    Current sinonasal medications include nothing regularly.  The last course of antibiotics was last year.  She does not regularly use nasal decongestant sprays.    She does not recall previously having allergy testing.    She denies a history of asthma.    She relates a history of reflux symptoms which is not currently managed with medication.  She has not previously had an EGD.    She denies have a diagnosis of obstructive sleep apnea.     She has previously had sinonasal surgery consisting of some sort of sinus washout in the 1990s in [place].  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    She does not recall a prior history of nasal trauma other than the sinonasal surgery.    SNOT-22 score = 58, NOSE score = 25%, ETDQ-7 score = 3.6    Global QOL = 55%    Days missed = Less than 5      Past Medical History  She has a past medical history of Acute gastritis without mention of hemorrhage, Esophageal reflux, Meningitis,  Migraine without aura, without mention of intractable migraine without mention of status migrainosus, Personal history of malignant neoplasm of brain, and Tarsal tunnel syndrome.    Past Surgical History  She has a past surgical history that includes cerebellar astrocytoma; right mastoidectomy; and eye-superior oblique palsy.    Family History  Her family history includes Alzheimer's disease in her maternal grandfather and maternal grandmother; Arrhythmia in her mother; Asthma in her brother and mother; Breast cancer in her maternal aunt; Heart failure in her maternal grandmother; Hyperlipidemia in her maternal grandmother; Hypertension in her maternal grandfather and maternal grandmother; Migraines in her mother; Osteoarthritis in her maternal grandfather and maternal grandmother.    Social History  She reports that she has never smoked. She has never used smokeless tobacco. She reports that she drinks about 1.2 oz of alcohol per week. She reports that she does not use drugs.    Allergies  She is allergic to septra [sulfamethoxazole-trimethoprim] and sulfa (sulfonamide antibiotics).    Medications   She has a current medication list which includes the following prescription(s): denta 5000 plus, fluticasone propionate, gabapentin, ketorolac, loratadine, magnesium, methocarbamol, methocarbamol, multivitamin, naproxen, naratriptan, polycarbophil, propranolol, ranitidine, and sumatriptan.    Review of Systems  Review of Systems   Constitutional: Positive for appetite change. Negative for fatigue, fever and unexpected weight change.   HENT: Positive for postnasal drip. Negative for congestion, dental problem, ear discharge, ear pain, facial swelling, hearing loss, hoarse voice, nosebleeds, rhinorrhea, sinus pressure, sore throat, tinnitus, trouble swallowing and voice change.    Eyes: Negative for photophobia, discharge, itching and visual disturbance.   Respiratory: Negative for apnea, cough, shortness of breath and  "wheezing.    Cardiovascular: Negative for chest pain and palpitations.   Gastrointestinal: Negative for abdominal pain, nausea and vomiting.   Endocrine: Negative for cold intolerance and heat intolerance.   Genitourinary: Negative for difficulty urinating.   Musculoskeletal: Positive for arthralgias. Negative for back pain, joint swelling, myalgias and neck pain.   Skin: Negative for rash.   Allergic/Immunologic: Negative for environmental allergies and food allergies.   Neurological: Negative for dizziness, seizures, syncope, weakness and headaches.   Hematological: Negative for adenopathy. Does not bruise/bleed easily.   Psychiatric/Behavioral: Negative for decreased concentration, dysphoric mood and sleep disturbance. The patient is not nervous/anxious.           Objective:     /74   Pulse 70   Ht 5' 2" (1.575 m)   Wt 88.5 kg (195 lb 1.7 oz)   BMI 35.69 kg/m²        Constitutional:   She appears well-developed. She is cooperative. Normal speech.  No hoarse voice.      Head:  Normocephalic. Salivary glands normal.  Facial strength is normal.      Ears:    Right Ear: No drainage or tenderness. Tympanic membrane is not perforated. Tympanic membrane mobility is normal. No middle ear effusion. No decreased hearing is noted.   Left Ear: No drainage or tenderness. Tympanic membrane is not perforated. Tympanic membrane mobility is normal.  No middle ear effusion. No decreased hearing is noted.     Nose:  No mucosal edema, rhinorrhea, septal deviation or polyps. No epistaxis. Turbinates normal, no turbinate masses and no turbinate hypertrophy.  Right sinus exhibits no maxillary sinus tenderness and no frontal sinus tenderness. Left sinus exhibits no maxillary sinus tenderness and no frontal sinus tenderness.     Mouth/Throat  Oropharynx clear and moist without lesions or asymmetry and normal uvula midline. She does not have dentures. Normal dentition. No oral lesions or mucous membrane lesions. No oropharyngeal " exudate or posterior oropharyngeal erythema. Mirror exam not performed due to patient tolerance.  Mirror exam not performed due to patient tolerance.      Neck:  Neck normal without thyromegaly masses, asymmetry, normal tracheal structure, crepitus, and tenderness, thyroid normal, trachea normal and no adenopathy. Normal range of motion present.     She has no cervical adenopathy.     Cardiovascular:   Regular rhythm.      Pulmonary/Chest:   Effort normal.     Psychiatric:   She has a normal mood and affect. Her speech is normal and behavior is normal.     Neurological:   No cranial nerve deficit.     Skin:   No rash noted.       Procedure    Nasal endoscopy performed.  See procedure note.        Data Reviewed    WBC (K/uL)   Date Value   04/02/2018 7.31     Eosinophil% (%)   Date Value   04/02/2018 7.4     Eos # (K/uL)   Date Value   04/02/2018 0.5     Platelets (K/uL)   Date Value   04/02/2018 186     Glucose (mg/dL)   Date Value   04/02/2018 87     No results found for: IGE    No sinus imaging available.       Assessment:     1. Chronic rhinitis    2. Acute recurrent sinusitis, unspecified location    3. Other recurrent acute nonsuppurative otitis media, unspecified laterality    4. Personal history of malignant neoplasm of brain         Plan:     I had a long discussion with the patient and her  regarding her condition and the further workup and management options.  I reassured her as to the benign nature of today's findings, including the absence of an active infection.  I prescribed the daily use of Flonase to treat sinonasal inflammation.  I counseled her that if recurrent ear infections become a concern, that she may see one of the department's otologists.  For now, I advised to refrain from traumatizing the ear canal.    Follow up if symptoms worsen or fail to improve.

## 2019-06-26 ENCOUNTER — PATIENT MESSAGE (OUTPATIENT)
Dept: NEUROLOGY | Facility: CLINIC | Age: 39
End: 2019-06-26

## 2019-07-24 DIAGNOSIS — M54.81 BILATERAL OCCIPITAL NEURALGIA: ICD-10-CM

## 2019-07-24 DIAGNOSIS — G44.86 CERVICOGENIC HEADACHE: ICD-10-CM

## 2019-07-24 DIAGNOSIS — G43.711 CHRONIC MIGRAINE WITHOUT AURA, WITH INTRACTABLE MIGRAINE, SO STATED, WITH STATUS MIGRAINOSUS: ICD-10-CM

## 2019-07-24 RX ORDER — METHOCARBAMOL 500 MG/1
TABLET, FILM COATED ORAL
Qty: 30 TABLET | Refills: 3 | Status: SHIPPED | OUTPATIENT
Start: 2019-07-24 | End: 2019-07-24 | Stop reason: SDUPTHER

## 2019-07-24 RX ORDER — METHOCARBAMOL 500 MG/1
TABLET, FILM COATED ORAL
Qty: 30 TABLET | Refills: 3 | Status: SHIPPED | OUTPATIENT
Start: 2019-07-24 | End: 2019-09-06

## 2019-07-24 RX ORDER — NAPROXEN 500 MG/1
500 TABLET ORAL 2 TIMES DAILY PRN
Qty: 20 TABLET | Refills: 6 | Status: SHIPPED | OUTPATIENT
Start: 2019-07-24 | End: 2020-04-23 | Stop reason: SDUPTHER

## 2019-08-05 ENCOUNTER — TELEPHONE (OUTPATIENT)
Dept: NEUROLOGY | Facility: CLINIC | Age: 39
End: 2019-08-05

## 2019-08-05 NOTE — TELEPHONE ENCOUNTER
----- Message from Toney Ahumada sent at 8/5/2019 11:05 AM CDT -----  Contact: MARSHA YORK [[id number]  Name of Who is Calling: MARSHA YORK [[id number]      What is the request in detail: Would like to speak with staff in regards to rescheduling her appointment. Please advise      Can the clinic reply by [facility]: no      What [facility] to [facility] Back [facility]: [phone]

## 2019-08-08 ENCOUNTER — PATIENT MESSAGE (OUTPATIENT)
Dept: NEUROLOGY | Facility: CLINIC | Age: 39
End: 2019-08-08

## 2019-09-06 ENCOUNTER — OFFICE VISIT (OUTPATIENT)
Dept: NEUROLOGY | Facility: CLINIC | Age: 39
End: 2019-09-06
Payer: COMMERCIAL

## 2019-09-06 VITALS
HEIGHT: 62 IN | HEART RATE: 79 BPM | SYSTOLIC BLOOD PRESSURE: 109 MMHG | DIASTOLIC BLOOD PRESSURE: 72 MMHG | BODY MASS INDEX: 35.7 KG/M2 | WEIGHT: 194 LBS

## 2019-09-06 DIAGNOSIS — G44.86 CERVICOGENIC HEADACHE: ICD-10-CM

## 2019-09-06 DIAGNOSIS — G43.711 CHRONIC MIGRAINE WITHOUT AURA, WITH INTRACTABLE MIGRAINE, SO STATED, WITH STATUS MIGRAINOSUS: ICD-10-CM

## 2019-09-06 DIAGNOSIS — M54.81 BILATERAL OCCIPITAL NEURALGIA: ICD-10-CM

## 2019-09-06 PROCEDURE — 3008F BODY MASS INDEX DOCD: CPT | Mod: CPTII,S$GLB,, | Performed by: PSYCHIATRY & NEUROLOGY

## 2019-09-06 PROCEDURE — 99214 OFFICE O/P EST MOD 30 MIN: CPT | Mod: S$GLB,,, | Performed by: PSYCHIATRY & NEUROLOGY

## 2019-09-06 PROCEDURE — 99999 PR PBB SHADOW E&M-EST. PATIENT-LVL III: CPT | Mod: PBBFAC,,, | Performed by: PSYCHIATRY & NEUROLOGY

## 2019-09-06 PROCEDURE — 3008F PR BODY MASS INDEX (BMI) DOCUMENTED: ICD-10-PCS | Mod: CPTII,S$GLB,, | Performed by: PSYCHIATRY & NEUROLOGY

## 2019-09-06 PROCEDURE — 99214 PR OFFICE/OUTPT VISIT, EST, LEVL IV, 30-39 MIN: ICD-10-PCS | Mod: S$GLB,,, | Performed by: PSYCHIATRY & NEUROLOGY

## 2019-09-06 PROCEDURE — 99999 PR PBB SHADOW E&M-EST. PATIENT-LVL III: ICD-10-PCS | Mod: PBBFAC,,, | Performed by: PSYCHIATRY & NEUROLOGY

## 2019-09-06 RX ORDER — KETOROLAC TROMETHAMINE 10 MG/1
TABLET, FILM COATED ORAL
Qty: 20 TABLET | Refills: 6 | Status: SHIPPED | OUTPATIENT
Start: 2019-09-06 | End: 2019-10-07

## 2019-09-06 RX ORDER — NARATRIPTAN 2.5 MG/1
TABLET ORAL
Qty: 9 TABLET | Refills: 6 | Status: SHIPPED | OUTPATIENT
Start: 2019-09-06 | End: 2020-04-23 | Stop reason: SDUPTHER

## 2019-09-06 RX ORDER — PROPRANOLOL HYDROCHLORIDE 40 MG/1
40 TABLET ORAL 2 TIMES DAILY
Qty: 60 TABLET | Refills: 6 | Status: SHIPPED | OUTPATIENT
Start: 2019-09-06 | End: 2020-04-23 | Stop reason: SDUPTHER

## 2019-09-06 RX ORDER — METHOCARBAMOL 500 MG/1
500 TABLET, FILM COATED ORAL NIGHTLY PRN
Qty: 30 TABLET | Refills: 6 | Status: SHIPPED | OUTPATIENT
Start: 2019-09-06 | End: 2020-04-23

## 2019-09-06 RX ORDER — SUMATRIPTAN SUCCINATE 100 MG/1
100 TABLET ORAL
Qty: 9 TABLET | Refills: 6 | Status: SHIPPED | OUTPATIENT
Start: 2019-09-06 | End: 2020-04-23 | Stop reason: SDUPTHER

## 2019-09-06 RX ORDER — GABAPENTIN 300 MG/1
600 CAPSULE ORAL NIGHTLY
Qty: 180 CAPSULE | Refills: 6 | Status: SHIPPED | OUTPATIENT
Start: 2019-09-06 | End: 2020-04-23 | Stop reason: SDUPTHER

## 2019-09-06 NOTE — PROGRESS NOTES
Subjective:       Patient ID: Silke Fulton is a 39 y.o. female.    Reason for Consult: Headache      Interval History:  Silke Fulton is here for follow up. Their condition has improved.  She notes that she has only really doing with 1 4 headaches per month.  She notes that she is using less rescue medication overall.  We have discussed that she has been told that she has been acting more forgetful by her  and potentially her mother.  She notes that she has lost ingredients to cook certain things at her home and she has also misplaced medication and she worries if the Robaxin as the cause of this.  We had planned on trying to wean her off 1 of her agents at last visit.      Objective:     Vitals:    09/06/19 1108   BP: 109/72   Pulse: 79     Patient is awake alert oriented to person place and time.  Moves all 4 extremities against gravity.  Gait and station within normal limits.  Cranial nerves 2-12 were without focal deficits.  Focused examination was undertaken today. Over 50% of face to face time of 25 minute visit time was in giving guidance, counseling and discussing treatment options.    Results for orders placed or performed during the hospital encounter of 12/22/17   MRI Brain W WO Contrast    Narrative    Procedure: MRI the brain with andwithout contrast.    Technique: Sagittal and axial T1, axial T2, axial FLAIR, axial gradient, axial diffusion, and axial, sagittal, and coronal postcontrast T1 images of the whole brain. 8  ml of Gadavist injected intravenously.         Comparison: 09/15/2015    Findings: There is remote operative change from suboccipital craniotomy for parasagittal posterior fossa lesion resection there is continued encephalomalacia of the cerebellum with subtle margin of T2 flair signal hyperintensity in the adjacent parenchyma suggestive for gliosis and scarring. There is no evidence for new prior focal signal abnormality or enhancement to suggest worsening  residual or recurrent lesion.    The ventricles are stable without hydrocephalus. There is mild prominence of the posterior horn the lateral ventricles symmetrically which may be sequela of prior hydrocephalus.    Punctate foci susceptibility posterior fossa suggestive for remote blood products primarily along the margin of the fourth ventricular resection cavity. Measured cranial T2 flow voids are present.    Impression     Remote operative change from suboccipital craniotomy with continued right cerebellar encephalomalacia. Continued and relatively stable subtle flair hyperintensity margin of the cerebellum suggestive for gliosis and scarring. No evidence for new signal abnormality or enhancement to suggest residual or recurrent mass.    Stable configuration of ventricles with mild prominence of the posterior horns lateral ventricles and moderate prominence of the fourth ventricle.    Please note there is mild mucosal thickening and probable small aerated secretions within the left maxillary antra cannot exclude component of acute sinusitis.        .      Electronically signed by: SHELBY CUBA DO  Date:     12/22/17  Time:    12:17    Results for orders placed or performed during the hospital encounter of 08/04/14   CT Head Without Contrast    Narrative    Comparison is made to a cranial CT exam dated 3/15/04, as well as to a more recent cranial MR exam of 12/10/07.    Postoperative changes are again identified in the posterior fossa, with an occipital calvarial defect observed and with a sizeable area of hypoattenuation representing local porencephaly/gliotic scarring seen within the superomedial aspect of the right   cerebellar hemisphere/superior cerebellar vermis.  This area of encephalomalacia is unchanged from the examinations noted above.  There is some associated prominence of the CSF spaces over the right cerebellar hemisphere, as before.  There is moderate   enlargement of both lateral ventricles and  enlargement of the fourth ventricle identified, but the size of the ventricular system is unchanged from the previous examinations, and the ventricles are normal in position without midline shift.  Well defined,   normal appearing sulci are identified over both cerebral convexities.  Parenchymal brain attenuation demonstrates no detrimental interval change since the 2004 CT exam.  No evidence of recent intracranial hemorrhage.  No new areas of hypoattenuation   indicating recent cerebral ischemia/infarction.  No subdural collections.  No findings on the noncontrast cranial CT to specifically suggest recurrent neoplasm.  Incidental note is made of mucosal thickening in the posterior ethmoid air cells on the   right side.    Impression     Noncontrast cranial CT examination demonstrates postoperative changes related to the posterior fossa as discussed above.  There has been no significant detrimental interval change since the cranial CT of 3/15/04.           Electronically signed by: Avi Rivas MD  Date:     08/05/14  Time:    05:26        Assessment/Plan:     Problem List Items Addressed This Visit        Neuro    Chronic migraine without aura, with intractable migraine, so stated, with status migrainosus    Overview     Preventive - gabapentin QHS, propranolol BID  Rescue - Imitrex/Naproxen, Amerge, Ketorolac, Robaxin  Triggers - weather, humidity, stress, hormones    1-4 days a month             Relevant Medications    propranolol (INDERAL) 40 MG tablet    sumatriptan (IMITREX) 100 MG tablet    naratriptan (AMERGE) 2.5 MG tablet    methocarbamol (ROBAXIN) 500 MG Tab    ketorolac (TORADOL) 10 mg tablet    gabapentin (NEURONTIN) 300 MG capsule    Cervicogenic headache    Relevant Medications    methocarbamol (ROBAXIN) 500 MG Tab       Orthopedic    Occipital neuralgia    Relevant Medications    gabapentin (NEURONTIN) 300 MG capsule        39-year-old female presents for evaluation follow-up of complex multifactorial  headaches.  We actually have relatively good control her headaches.  At this time we have discussed continuing her current regimen as outlined above.  The only major change will make this time is to switch her from taking Robaxin on a scheduled, daily basis to having her take it on an as-needed basis and see if this improves some of her forgetfulness as this may be a medication side effect.  We have discussed the fact that muscle relaxants can be sedating and cause cognitive issues.  I will see her back in 6 months.  I will follow up with them in 6 month(s).  The patient verbalizes understanding and agreement with the treatment plan. I have discussed risks, benefits and alternatives to the treatment plan. Questions were sought and answered to her stated verbal satisfaction.        Lakhwinder Luther MD    This note is dictated on M*Modal Fluency Direct word recognition program. There are word recognition mistakes that are occasionally missed on review.

## 2019-09-11 ENCOUNTER — OFFICE VISIT (OUTPATIENT)
Dept: OTOLARYNGOLOGY | Facility: CLINIC | Age: 39
End: 2019-09-11
Payer: COMMERCIAL

## 2019-09-11 DIAGNOSIS — H92.03 OTALGIA OF BOTH EARS: Primary | ICD-10-CM

## 2019-09-11 PROCEDURE — 99213 PR OFFICE/OUTPT VISIT, EST, LEVL III, 20-29 MIN: ICD-10-PCS | Mod: S$GLB,,, | Performed by: OTOLARYNGOLOGY

## 2019-09-11 PROCEDURE — 99213 OFFICE O/P EST LOW 20 MIN: CPT | Mod: S$GLB,,, | Performed by: OTOLARYNGOLOGY

## 2019-09-11 PROCEDURE — 99999 PR PBB SHADOW E&M-EST. PATIENT-LVL II: ICD-10-PCS | Mod: PBBFAC,,, | Performed by: OTOLARYNGOLOGY

## 2019-09-11 PROCEDURE — 99999 PR PBB SHADOW E&M-EST. PATIENT-LVL II: CPT | Mod: PBBFAC,,, | Performed by: OTOLARYNGOLOGY

## 2019-09-13 NOTE — PROGRESS NOTES
Subjective:       Patient ID: Silke Fulton is a 39 y.o. female.    Chief Complaint: Otalgia and Ear Fullness    Otalgia    There is pain in both ears. The current episode started 1 to 4 weeks ago. The problem has been waxing and waning. Pertinent negatives include no abdominal pain, ear discharge, headaches, hearing loss, rash or sore throat. There is no history of a tympanostomy tube.   Ear Fullness    Pertinent negatives include no abdominal pain, ear discharge, headaches, hearing loss, rash or sore throat. There is no history of a tympanostomy tube.     Review of Systems   Constitutional: Negative for chills, fever and unexpected weight change.   HENT: Positive for ear pain. Negative for ear discharge, hearing loss, sore throat and trouble swallowing.    Eyes: Negative for pain and visual disturbance.   Respiratory: Negative for apnea and shortness of breath.    Cardiovascular: Negative for chest pain and palpitations.   Gastrointestinal: Negative for abdominal pain and nausea.   Endocrine: Negative for cold intolerance and heat intolerance.   Musculoskeletal: Negative for joint swelling and neck stiffness.   Skin: Negative for color change and rash.   Neurological: Negative for facial asymmetry and headaches.   Hematological: Negative for adenopathy. Does not bruise/bleed easily.   Psychiatric/Behavioral: Negative for agitation. The patient is not nervous/anxious.        Objective:      Physical Exam   Constitutional: She is oriented to person, place, and time. She appears well-developed and well-nourished. No distress.   HENT:   Head: Normocephalic and atraumatic.   Right Ear: Tympanic membrane, external ear and ear canal normal.   Left Ear: Tympanic membrane, external ear and ear canal normal.   Nose: Nose normal.   Mouth/Throat: Uvula is midline, oropharynx is clear and moist and mucous membranes are normal.   Rodgers: Midline  Rinne: AC > BC @ 512Hz   Eyes: Pupils are equal, round, and reactive to  light. Conjunctivae and EOM are normal.   Neck: Normal range of motion. No tracheal deviation present. No thyromegaly present.   Cardiovascular: Normal rate and regular rhythm.   Pulmonary/Chest: Effort normal. No respiratory distress.   Musculoskeletal: Normal range of motion.   Lymphadenopathy:        Head (right side): No submental, no submandibular and no tonsillar adenopathy present.        Head (left side): No submental, no submandibular and no tonsillar adenopathy present.     She has no cervical adenopathy.   Neurological: She is alert and oriented to person, place, and time.   Psychiatric: She has a normal mood and affect. Her behavior is normal.   Nursing note and vitals reviewed.          Assessment:       1. Otalgia of both ears        Plan:     ear exam is benign.  Discussed ears overall normal. With no signs of infection. Reassurance provided follow up PRN

## 2019-10-07 ENCOUNTER — OFFICE VISIT (OUTPATIENT)
Dept: URGENT CARE | Facility: CLINIC | Age: 39
End: 2019-10-07
Payer: COMMERCIAL

## 2019-10-07 VITALS
RESPIRATION RATE: 17 BRPM | HEART RATE: 67 BPM | HEIGHT: 62 IN | DIASTOLIC BLOOD PRESSURE: 79 MMHG | BODY MASS INDEX: 35.7 KG/M2 | WEIGHT: 194 LBS | SYSTOLIC BLOOD PRESSURE: 115 MMHG | OXYGEN SATURATION: 99 % | TEMPERATURE: 99 F

## 2019-10-07 DIAGNOSIS — R52 PAIN: ICD-10-CM

## 2019-10-07 DIAGNOSIS — S93.401A SPRAIN OF RIGHT ANKLE, UNSPECIFIED LIGAMENT, INITIAL ENCOUNTER: Primary | ICD-10-CM

## 2019-10-07 PROCEDURE — 73610 XR ANKLE COMPLETE 3 VIEW RIGHT: ICD-10-PCS | Mod: RT,S$GLB,, | Performed by: RADIOLOGY

## 2019-10-07 PROCEDURE — 73610 X-RAY EXAM OF ANKLE: CPT | Mod: RT,S$GLB,, | Performed by: RADIOLOGY

## 2019-10-07 PROCEDURE — 99214 OFFICE O/P EST MOD 30 MIN: CPT | Mod: 25,S$GLB,, | Performed by: NURSE PRACTITIONER

## 2019-10-07 PROCEDURE — 3008F PR BODY MASS INDEX (BMI) DOCUMENTED: ICD-10-PCS | Mod: CPTII,S$GLB,, | Performed by: NURSE PRACTITIONER

## 2019-10-07 PROCEDURE — 96372 PR INJECTION,THERAP/PROPH/DIAG2ST, IM OR SUBCUT: ICD-10-PCS | Mod: S$GLB,,, | Performed by: NURSE PRACTITIONER

## 2019-10-07 PROCEDURE — 96372 THER/PROPH/DIAG INJ SC/IM: CPT | Mod: S$GLB,,, | Performed by: NURSE PRACTITIONER

## 2019-10-07 PROCEDURE — 99214 PR OFFICE/OUTPT VISIT, EST, LEVL IV, 30-39 MIN: ICD-10-PCS | Mod: 25,S$GLB,, | Performed by: NURSE PRACTITIONER

## 2019-10-07 PROCEDURE — 3008F BODY MASS INDEX DOCD: CPT | Mod: CPTII,S$GLB,, | Performed by: NURSE PRACTITIONER

## 2019-10-07 RX ORDER — IBUPROFEN 600 MG/1
600 TABLET ORAL EVERY 8 HOURS PRN
Qty: 30 TABLET | Refills: 0 | Status: SHIPPED | OUTPATIENT
Start: 2019-10-08 | End: 2021-07-10 | Stop reason: ALTCHOICE

## 2019-10-07 RX ORDER — KETOROLAC TROMETHAMINE 30 MG/ML
30 INJECTION, SOLUTION INTRAMUSCULAR; INTRAVENOUS
Status: COMPLETED | OUTPATIENT
Start: 2019-10-07 | End: 2019-10-07

## 2019-10-07 RX ADMIN — KETOROLAC TROMETHAMINE 30 MG: 30 INJECTION, SOLUTION INTRAMUSCULAR; INTRAVENOUS at 08:10

## 2019-10-08 NOTE — PATIENT INSTRUCTIONS
Return to Urgent Care or go to ER if symptoms worsen or fail to improve.  Follow up with PCP as recommended for further management.       ACE Wrap  Minor muscle or joint injuries are often treated with an elastic bandage. The bandage provides support and compression to the injured area. An elastic bandage is a stretchy, rolled bandage. Elastic bandages range in width from 2 to 6 inches. They can be used for a variety of injuries. The bandages are often called ACE bandages, after the most common brand name.  If used correctly, elastic bandages help control swelling and ease pain. An elastic bandage is also a good reminder not to overuse the injured area. However, elastic bandages do not provide a lot of support and will not prevent reinjury.  Home care    To apply an elastic bandage:  · Check the skin before wrapping the injury. It should be clean, dry, and free of drainage.  · Start wrapping below the injury and work your way toward the body. For an ankle sprain, start wrapping around the foot and work up toward the calf. This will help control swelling.  · Overlap the edges of the bandage so it stays snuggly in place.  · Wrap the bandage firmly, but not too tightly. A tight bandage can increase swelling on either end of the bandage. Make sure the bandage is wrinkle free.  · Leave fingers and toes exposed.  · Secure ends of the bandage (even self-sticking ones) with clips or tape.  · Check frequently to ensure adequate circulation, especially in the fingers and toes. Loosen the bandage if there is local swelling, numbness, tingling, discomfort, coldness, or discoloration (skin pale or bluish in color).  · Rewrap the bandage as needed during the day. Reroll the bandage as you unwind it.  Continue using the elastic bandage until the pain and swelling are gone or as your healthcare provider advises.  If you have been told to ice the area, the ice can be secured in place with the elastic bandage. Wrap the ice pack with  a thin towel to protect the skin. Do not put ice or an ice pack directly on the skin.  Ice the area for no more than 20 minutes at a time.    Follow-up care  Follow up with your healthcare provider, as advised.  When to seek medical advice  Call your healthcare provider for any of the following:  · Pain and swelling that doesn't get better or gets worse  · Trouble moving injured area  · Skin discoloration, numbness, or tingling that doesnt go away after bandage is removed  Date Last Reviewed: 9/13/2015  © 7081-1520 [employer]. [address] [place] [id number]. All rights reserved. This information is not intended as a substitute for professional medical care. Always follow your healthcare professional's instructions.        Ankle Sprain, with X-Ray   A sprain is an injury to the ligaments that hold the ankle joint together. There are no broken bones. Most sprains take about 4 to 6 weeks to heal. A severe sprain, where the ligament is completely torn, can take several months to recover.  Mild to moderate sprains may be treated with an elastic wrap or an in-shoe splint. This will provide support and prevent re-injury. A mild sprain may not need any additional support. A severe sprain may require surgery to repair. In some cases a cast or boot may be needed.  Home care  · Stay off your injured ankle as much as possible until you can walk on it without pain. If you have a lot of pain with walking, then crutches or a walker may be prescribed. These can be rented or purchased at many pharmacies and surgical or orthopedic supply stores. Follow your healthcare providers advice about when to begin bearing weight on that leg.  · Keep your leg elevated to reduce pain and swelling. When sleeping, place a pillow under your injured ankle. When sitting, support your injured ankle and leg so they are level with your waist. This is very important during the first 48 hours.  · Place an ice pack over the  injured area for no more than 20 minutes. Do this every 3 to 6 hours for the first 24 to 48 hours, or as instructed. To make an ice pack, put ice cubes in a plastic bag that seals at the top. Wrap the bag in a clean, thin towel or cloth. You can place the ice pack directly over the wrap, splint, or cast. Never place an ice pack directly on your skin. As the ice melts, be careful not to get any wrap, splint, or cast wet. Keep using ice packs as needed to ease pain and swelling.    · If you have a boot, open it to apply an ice pack, unless told otherwise by your provider. Wrap the ice pack in a clean, thin towel or cloth. Never put an ice pack directly on your skin.  · After 48 hours, it may also be helpful to apply heat for no more than 20 minutes, several times a day. You can do this with a heating pad or warm compress. Or you may want to go back and forth between using ice and heat. Never apply heat directly to the skin. Always wrap the heating pad or warm compress in a clean, thin towel or cloth.  · You may use over-the-counter pain medicine to ease pain, unless another pain medicine was prescribed. Talk with your provider before using these medicines if you have chronic liver or kidney disease, or have ever had a stomach ulcer or GI (gastrointestinal) bleeding.  · You may return to sports after your ankle heals, when you can run without pain.  · You are at risk of getting injured again for the first 6 weeks. During that time, protect your ankle with an in-shoe splint that prevents your ankle from tilting side to side. This is very important if you do active work or play sports during that time.  Follow-up care  Any X-rays you had today dont show any broken bones, breaks, or fractures. Bruises and sprains can sometimes hurt as much as a fracture. These injuries can take time to heal completely. If your symptoms dont improve or they get worse, talk with your healthcare provider. You may need a repeat X-ray.  When  to seek medical advice  Call your healthcare provider right away if any of these occur:  · The plaster cast or splint gets wet or soft  · The fiberglass cast or splint gets wet and does not dry for 24 hours  · The pain or swelling increases, or redness appears  · The cast has a bad smell  · Fever of 100.4 F (38 C) or higher, or as directed  · Your toes become cold, blue, numb, or tingly  · You re-injure your ankle  Date Last Reviewed: 11/20/2015  © 2810-4765 [employer]. [address] [place] [address]. All rights reserved. This information is not intended as a substitute for professional medical care. Always follow your healthcare professional's instructions.

## 2019-10-08 NOTE — PROGRESS NOTES
"Subjective:       Patient ID: Silke Fulton is a 39 y.o. female.    Vitals:  height is 5' 2" (1.575 m) and weight is 88 kg (194 lb). Her oral temperature is 99.3 °F (37.4 °C). Her blood pressure is 115/79 and her pulse is 67. Her respiration is 17 and oxygen saturation is 99%.     Chief Complaint: Ankle Pain    This is a 39 y.o. female who presents today with a chief complaint of   Rt ankle and foot pain. Pt was walking dog yesterday and tripped and twisted her ankle and foot. Applied ice and took advil     Ankle Pain    The incident occurred 12 to 24 hours ago. The incident occurred in the street. The injury mechanism was a twisting injury. The pain is present in the right ankle and right foot. The quality of the pain is described as shooting, stabbing and aching. The pain is at a severity of 6/10. The pain is moderate. She reports no foreign bodies present. The symptoms are aggravated by movement and weight bearing. She has tried ice and NSAIDs for the symptoms. The treatment provided no relief.       Constitution: Negative for fatigue.   HENT: Negative for facial swelling and facial trauma.    Neck: Negative for neck stiffness.   Cardiovascular: Negative for chest trauma.   Eyes: Negative for eye trauma, double vision and blurred vision.   Gastrointestinal: Negative for abdominal trauma, abdominal pain and rectal bleeding.   Genitourinary: Negative for hematuria, missed menses, genital trauma and pelvic pain.   Musculoskeletal: Positive for pain, trauma, joint pain and joint swelling. Negative for abnormal ROM of joint.   Skin: Negative for color change, wound, abrasion, laceration and bruising.   Neurological: Negative for dizziness, history of vertigo, light-headedness, coordination disturbances, altered mental status and loss of consciousness.   Hematologic/Lymphatic: Negative for history of bleeding disorder.   Psychiatric/Behavioral: Negative for altered mental status.       Objective:    "   Physical Exam   Constitutional: She is oriented to person, place, and time. She appears well-developed and well-nourished. She is cooperative.  Non-toxic appearance. She does not appear ill. No distress.   HENT:   Head: Normocephalic and atraumatic. Head is without abrasion, without contusion and without laceration.   Right Ear: Hearing, tympanic membrane, external ear and ear canal normal. No hemotympanum.   Left Ear: Hearing, tympanic membrane, external ear and ear canal normal. No hemotympanum.   Nose: Nose normal. No mucosal edema, rhinorrhea or nasal deformity. No epistaxis. Right sinus exhibits no maxillary sinus tenderness and no frontal sinus tenderness. Left sinus exhibits no maxillary sinus tenderness and no frontal sinus tenderness.   Mouth/Throat: Uvula is midline, oropharynx is clear and moist and mucous membranes are normal. No trismus in the jaw. Normal dentition. No uvula swelling. No posterior oropharyngeal erythema.   Eyes: Pupils are equal, round, and reactive to light. Conjunctivae, EOM and lids are normal. Right eye exhibits no discharge. Left eye exhibits no discharge. No scleral icterus.   Sclera clear bilat   Neck: Trachea normal, normal range of motion, full passive range of motion without pain and phonation normal. Neck supple. No spinous process tenderness and no muscular tenderness present. No neck rigidity. No tracheal deviation present.   Cardiovascular: Normal rate, regular rhythm, normal heart sounds, intact distal pulses and normal pulses.   Pulmonary/Chest: Effort normal and breath sounds normal. No respiratory distress.   Abdominal: Soft. Normal appearance and bowel sounds are normal. She exhibits no distension, no pulsatile midline mass and no mass. There is no tenderness.   Musculoskeletal: She exhibits edema. She exhibits no deformity.        Right foot: There is decreased range of motion (limited AROM due to pain), tenderness and swelling. There is normal capillary refill.    Neurological: She is alert and oriented to person, place, and time. She has normal strength. No cranial nerve deficit or sensory deficit. She exhibits normal muscle tone. She displays no seizure activity. Coordination normal. GCS eye subscore is 4. GCS verbal subscore is 5. GCS motor subscore is 6.   Skin: Skin is warm, dry and intact. Capillary refill takes less than 2 seconds. No abrasion, no bruising, no burn, no ecchymosis, no laceration and no rash noted. She is not diaphoretic. No pallor.   Psychiatric: She has a normal mood and affect. Her speech is normal and behavior is normal. Judgment and thought content normal. Cognition and memory are normal.   Nursing note and vitals reviewed.      Assessment:       1. Sprain of right ankle, unspecified ligament, initial encounter    2. Pain        Plan:         Sprain of right ankle, unspecified ligament, initial encounter  -     BANDAGE ELASTIC 4IN ACE NS    Pain  -     XR ANKLE COMPLETE 3 VIEW RIGHT; Future; Expected date: 10/07/2019  -     BANDAGE ELASTIC 4IN ACE NS    Other orders  -     ketorolac injection 30 mg  -     ibuprofen (ADVIL,MOTRIN) 600 MG tablet; Take 1 tablet (600 mg total) by mouth every 8 (eight) hours as needed.  Dispense: 30 tablet; Refill: 0

## 2019-12-23 ENCOUNTER — PATIENT OUTREACH (OUTPATIENT)
Dept: ADMINISTRATIVE | Facility: OTHER | Age: 39
End: 2019-12-23

## 2019-12-26 ENCOUNTER — OFFICE VISIT (OUTPATIENT)
Dept: OBSTETRICS AND GYNECOLOGY | Facility: CLINIC | Age: 39
End: 2019-12-26
Payer: COMMERCIAL

## 2019-12-26 VITALS
SYSTOLIC BLOOD PRESSURE: 110 MMHG | BODY MASS INDEX: 35.94 KG/M2 | WEIGHT: 195.31 LBS | DIASTOLIC BLOOD PRESSURE: 70 MMHG | HEIGHT: 62 IN

## 2019-12-26 DIAGNOSIS — Z12.39 BREAST CANCER SCREENING: ICD-10-CM

## 2019-12-26 DIAGNOSIS — Z01.419 VISIT FOR GYNECOLOGIC EXAMINATION: Primary | ICD-10-CM

## 2019-12-26 PROCEDURE — 99396 PR PREVENTIVE VISIT,EST,40-64: ICD-10-PCS | Mod: S$GLB,,, | Performed by: OBSTETRICS & GYNECOLOGY

## 2019-12-26 PROCEDURE — 99396 PREV VISIT EST AGE 40-64: CPT | Mod: S$GLB,,, | Performed by: OBSTETRICS & GYNECOLOGY

## 2019-12-26 PROCEDURE — 99999 PR PBB SHADOW E&M-EST. PATIENT-LVL III: CPT | Mod: PBBFAC,,, | Performed by: OBSTETRICS & GYNECOLOGY

## 2019-12-26 PROCEDURE — 99999 PR PBB SHADOW E&M-EST. PATIENT-LVL III: ICD-10-PCS | Mod: PBBFAC,,, | Performed by: OBSTETRICS & GYNECOLOGY

## 2019-12-26 NOTE — PROGRESS NOTES
"HISTORY OF PRESENT ILLNESS:    Silke Fulton is a 39 y.o. female, , Patient's last menstrual period was 2019.,  presents for a routine exam and has no complaints. PAP DUE END  AND REF MAMMO.  DECLINES CONTRACEPT.  SOME ITCHING AFTER WAXING 3 DAYS AGO - NO INFECTION OR LESION NOTED - OK CORTAID FOR PROB TOPICAL SKIN IRRITATION  DISCUSSED HERBAL SUPPLEMENTS AND GETTING NAILS DONE WITH HER PREGNANT SISTER . .     LAST VISIT 2018:  PAP DUE , REF MAMMO BY HER PCP, WILL SCHEDULE.  FLU VACCINE REF.  SOME NILESH SX  We reviewed the role of bladder training,  timed voiding, Kegel exercises, and the avoidance of bladder irritants in managing mild symptoms conservatively.  NO GYN C/O  LAST VISIT :  PAP DUE AND SUBMITTED.  HAS SOME RLQ PAIN, INTERMITTENT OVER THE PAST FEW MONTHS, NOT ASSOCIATED WITH CYCLE.   POSSIBLY ASSOCIATED GI SYMPTOMS/ PROBLEMS.  REASSURED NORMAL EXAM.  DECLINES CONTRACEPTION  LAST VISIT 2016:   PAP DUE .  SISTER WITH LOSS - FAMILY WITH ?TRISOMY 13 TRANSLOCATION - (PT SAYS THAT 13 AND 14TH CHROMOSOMES CONNECTED) - WILL CHECK PT'S CHROMOSOMES NOW.  NOT CONTRACEPTING BUT NOT ACTIVELY TRYING.    LAST VISIT 2015:  LAST PAP , DISCUSSED SCREENING RECOMMENDATIONS. PREFERS PAP THIS YEAR AND THEN WILL FOLLOW RECS. STOPPED OCP LAST YEAR (HAD SOME PEDAL EDEMA LAST YEAR). DECLINES PROGESTERONE-ONLY CONTRACEPTION AND "WAIT AND SEE." - ADVISED PNV. IS CONSIDERING ACTIVELY PURSUING PREGNANCY AND WILL REF TO  FIRST FOR EVALUATION.  LLQ PAIN X 1 WEEK IN July, PRECEEDED MENSES.    Past Medical History:   Diagnosis Date    Acute gastritis without mention of hemorrhage     Esophageal reflux     Meningitis     staph aureus    Migraine without aura, without mention of intractable migraine without mention of status migrainosus     Personal history of malignant neoplasm of brain     cerebellar astrocytoma    Tarsal tunnel syndrome        Past Surgical History:   Procedure " Laterality Date    cerebellar astrocytoma      eye-superior oblique palsy      right mastoidectomy         MEDICATIONS AND ALLERGIES:      Current Outpatient Medications:     DENTA 5000 PLUS 1.1 % Crea, BRUSH TEETH FOR 2 MINUTES AT BEDTIME. EXPECTORATE., Disp: , Rfl: 2    gabapentin (NEURONTIN) 300 MG capsule, Take 2 capsules (600 mg total) by mouth every evening., Disp: 180 capsule, Rfl: 6    ibuprofen (ADVIL,MOTRIN) 600 MG tablet, Take 1 tablet (600 mg total) by mouth every 8 (eight) hours as needed., Disp: 30 tablet, Rfl: 0    loratadine (CLARITIN) 10 mg tablet, Take 10 mg by mouth once daily., Disp: , Rfl:     magnesium 250 mg Tab, Take 500 mg by mouth once daily. , Disp: , Rfl:     methocarbamol (ROBAXIN) 500 MG Tab, Take 1 tablet (500 mg total) by mouth nightly as needed (neck muscle spasm)., Disp: 30 tablet, Rfl: 6    multivitamin (THERAGRAN) per tablet, Take 1 tablet by mouth once daily.  , Disp: , Rfl:     naproxen (EC NAPROSYN) 500 MG EC tablet, TAKE 1 TABLET (500 MG TOTAL) BY MOUTH 2 (TWO) TIMES DAILY AS NEEDED (HEADACHE)., Disp: 20 tablet, Rfl: 6    naratriptan (AMERGE) 2.5 MG tablet, TAKE 1 TABLET BY MOUTH ONSET OF HEADACHE MAY REPEAT IN 4 HRS IF NEEDED MAX 2 TABS/3DAYS USE IN WEEK, Disp: 9 tablet, Rfl: 6    polycarbophil (FIBERCON) 625 mg tablet, Take 625 mg by mouth once daily., Disp: , Rfl:     propranolol (INDERAL) 40 MG tablet, Take 1 tablet (40 mg total) by mouth 2 (two) times daily., Disp: 60 tablet, Rfl: 6    ranitidine (ZANTAC) 150 MG tablet, Take 150 mg by mouth once daily., Disp: , Rfl:     sumatriptan (IMITREX) 100 MG tablet, Take 1 tablet (100 mg total) by mouth every 2 (two) hours as needed for Migraine., Disp: 9 tablet, Rfl: 6    Review of patient's allergies indicates:   Allergen Reactions    Septra [sulfamethoxazole-trimethoprim] Other (See Comments)    Sulfa (sulfonamide antibiotics)      Bone marrow suppression         Family History   Problem Relation Age of Onset     Breast cancer Maternal Aunt     Migraines Mother     Arrhythmia Mother     Asthma Mother     Asthma Brother     Heart failure Maternal Grandmother     Hyperlipidemia Maternal Grandmother     Hypertension Maternal Grandmother     Osteoarthritis Maternal Grandmother     Alzheimer's disease Maternal Grandmother     Hypertension Maternal Grandfather     Osteoarthritis Maternal Grandfather     Alzheimer's disease Maternal Grandfather     Colon cancer Neg Hx        Social History     Socioeconomic History    Marital status:      Spouse name: Not on file    Number of children: 0    Years of education: 20    Highest education level: Not on file   Occupational History    Occupation: Call Center Counsellor     Employer: via [employer]   Social Needs    Financial resource strain: Not on file    Food insecurity:     Worry: Not on file     Inability: Not on file    Transportation needs:     Medical: Not on file     Non-medical: Not on file   Tobacco Use    Smoking status: Never Smoker    Smokeless tobacco: Never Used   Substance and Sexual Activity    Alcohol use: Yes     Alcohol/week: 2.0 standard drinks     Types: 2 Standard drinks or equivalent per week     Comment: socially    Drug use: No    Sexual activity: Yes     Partners: Male     Birth control/protection: None   Lifestyle    Physical activity:     Days per week: Not on file     Minutes per session: Not on file    Stress: Not on file   Relationships    Social connections:     Talks on phone: Not on file     Gets together: Not on file     Attends Church service: Not on file     Active member of club or organization: Not on file     Attends meetings of clubs or organizations: Not on file     Relationship status: Not on file   Other Topics Concern    Not on file   Social History Narrative           COMPREHENSIVE GYN HISTORY:  PAP History: Denies abnormal Paps.  Infection History: Denies STDs. Denies PID.  Benign History: Denies  "uterine fibroids. Denies ovarian cysts. Denies endometriosis. Denies other conditions.  Cancer History: Denies cervical cancer. Denies uterine cancer or hyperplasia. Denies ovarian cancer. Denies vulvar cancer or pre-cancer. Denies vaginal cancer or pre-cancer. Denies breast cancer. Denies colon cancer.  Sexual Activity History: Reports currently being sexually active  Menstrual History: Monthly, mild-moderate.  Contraception:no method    ROS:  GENERAL: No weight changes. No swelling. No fatigue. No fever.  CARDIOVASCULAR: No chest pain. No shortness of breath. No leg cramps.   NEUROLOGICAL: No headaches. No vision changes.  BREASTS: No pain. No lumps. No discharge.  ABDOMEN: No pain. No nausea. No vomiting. No diarrhea. No constipation.  REPRODUCTIVE: No abnormal bleeding.   VULVA: No pain. No lesions. No itching.  VAGINA: No relaxation. No itching. No odor. No discharge. No lesions.  URINARY: No incontinence. No nocturia. No frequency. No dysuria.    /70   Ht 5' 2" (1.575 m)   Wt 88.6 kg (195 lb 5.2 oz)   LMP 12/03/2019   BMI 35.73 kg/m²     PE:  APPEARANCE: Well nourished, well developed, in no acute distress.  AFFECT: WNL, alert and oriented x 3.  SKIN: No acne or hirsutism.  NECK: Neck symmetric, without masses or thyromegaly.  NODES: No inguinal, cervical, axillary or femoral lymph node enlargement.  CHEST: Good respiratory effort.   ABDOMEN: Soft. No tenderness or masses. No hepatosplenomegaly. No hernias.  BREASTS: Symmetrical, no skin changes, visible lesions, palpable masses or nipple discharge bilaterally.  PELVIC: External female genitalia without lesions.  Female hair distribution. Adequate perineal body, Normal urethral meatus. Vagina moist and well rugated without lesions or discharge.  No significant cystocele or rectocele present. Cervix pink without lesions, discharge or tenderness. Uterus is normal size, regular, mobile and nontender. Adnexa without masses or tenderness.  EXTREMITIES: No " edema    PROCEDURES:      DIAGNOSIS:  1. Visit for gynecologic examination     2. Breast cancer screening  Mammo Digital Screening Bilat w/ Zane       LABS AND TESTS ORDERED:    MEDICATIONS PRESCRIBED:    COUNSELING:   The patient was counseled today on ACS PAP guidelines, with recommendations for yearly pelvic exams unless their uterus, cervix, and ovaries were removed for benign reasons; in that case, examinations every 3-5 years are recommended.  The patient was also counseled regarding monthly breast self-examination, routine STD screening for at-risk populations, prophylactic immunizations for transmitted infections such as  HPV, Pertussis, or Influenza as appropriate, and yearly mammograms when indicated by ACS guidelines.  She was advised to see her primary care physician for all other health maintenance.    FOLLOW-UP with me annually.

## 2020-01-03 ENCOUNTER — HOSPITAL ENCOUNTER (OUTPATIENT)
Dept: RADIOLOGY | Facility: HOSPITAL | Age: 40
Discharge: HOME | End: 2020-01-03
Attending: OBSTETRICS & GYNECOLOGY
Payer: COMMERCIAL

## 2020-01-03 DIAGNOSIS — Z12.39 BREAST CANCER SCREENING: ICD-10-CM

## 2020-01-03 PROCEDURE — 77067 SCR MAMMO BI INCL CAD: CPT | Mod: 26,,, | Performed by: RADIOLOGY

## 2020-01-03 PROCEDURE — 77063 MAMMO DIGITAL SCREENING BILAT WITH TOMOSYNTHESIS_CAD: ICD-10-PCS | Mod: 26,,, | Performed by: RADIOLOGY

## 2020-01-03 PROCEDURE — 77067 SCR MAMMO BI INCL CAD: CPT | Mod: TC

## 2020-01-03 PROCEDURE — 77067 MAMMO DIGITAL SCREENING BILAT WITH TOMOSYNTHESIS_CAD: ICD-10-PCS | Mod: 26,,, | Performed by: RADIOLOGY

## 2020-01-03 PROCEDURE — 77063 BREAST TOMOSYNTHESIS BI: CPT | Mod: 26,,, | Performed by: RADIOLOGY

## 2020-01-06 ENCOUNTER — PATIENT MESSAGE (OUTPATIENT)
Dept: OBSTETRICS AND GYNECOLOGY | Facility: CLINIC | Age: 40
End: 2020-01-06

## 2020-01-06 DIAGNOSIS — Z91.89 INCREASED RISK OF BREAST CANCER: Primary | ICD-10-CM

## 2020-01-08 ENCOUNTER — PATIENT OUTREACH (OUTPATIENT)
Dept: ADMINISTRATIVE | Facility: OTHER | Age: 40
End: 2020-01-08

## 2020-01-24 ENCOUNTER — TELEPHONE (OUTPATIENT)
Dept: SURGERY | Facility: CLINIC | Age: 40
End: 2020-01-24

## 2020-01-24 NOTE — TELEPHONE ENCOUNTER
Message was left for Ms.Kirstin regarding referral placed in the system by Dr.Ellen Byers GYN  for High Risk Breast Cancer due to Family Hx. Please call Diya moura @ [phone] as I would be happy to schedule this apt .

## 2020-01-28 ENCOUNTER — TELEPHONE (OUTPATIENT)
Dept: OBSTETRICS AND GYNECOLOGY | Facility: CLINIC | Age: 40
End: 2020-01-28

## 2020-02-06 ENCOUNTER — PATIENT OUTREACH (OUTPATIENT)
Dept: ADMINISTRATIVE | Facility: OTHER | Age: 40
End: 2020-02-06

## 2020-02-06 NOTE — PROGRESS NOTES
Chart reviewed.   Immunizations: updated  Orders placed: n/a  Upcoming appts to satisfy [facility] topics: 02/07/2020 Optometry appt with Dr. Cruz

## 2020-03-01 ENCOUNTER — PATIENT OUTREACH (OUTPATIENT)
Dept: ADMINISTRATIVE | Facility: OTHER | Age: 40
End: 2020-03-01

## 2020-03-03 ENCOUNTER — OFFICE VISIT (OUTPATIENT)
Dept: OPTOMETRY | Facility: CLINIC | Age: 40
End: 2020-03-03
Payer: COMMERCIAL

## 2020-03-03 DIAGNOSIS — H52.203 ASTIGMATISM OF BOTH EYES, UNSPECIFIED TYPE: Primary | ICD-10-CM

## 2020-03-03 DIAGNOSIS — H49.12 FOURTH NERVE PALSY OF LEFT EYE: ICD-10-CM

## 2020-03-03 DIAGNOSIS — Z85.841 PERSONAL HISTORY OF MALIGNANT NEOPLASM OF BRAIN: ICD-10-CM

## 2020-03-03 DIAGNOSIS — Z98.890 HISTORY OF STRABISMUS SURGERY: ICD-10-CM

## 2020-03-03 PROCEDURE — 99999 PR PBB SHADOW E&M-EST. PATIENT-LVL II: ICD-10-PCS | Mod: PBBFAC,,, | Performed by: OPTOMETRIST

## 2020-03-03 PROCEDURE — 92014 COMPRE OPH EXAM EST PT 1/>: CPT | Mod: S$GLB,,, | Performed by: OPTOMETRIST

## 2020-03-03 PROCEDURE — 92015 PR REFRACTION: ICD-10-PCS | Mod: S$GLB,,, | Performed by: OPTOMETRIST

## 2020-03-03 PROCEDURE — 99999 PR PBB SHADOW E&M-EST. PATIENT-LVL II: CPT | Mod: PBBFAC,,, | Performed by: OPTOMETRIST

## 2020-03-03 PROCEDURE — 92015 DETERMINE REFRACTIVE STATE: CPT | Mod: S$GLB,,, | Performed by: OPTOMETRIST

## 2020-03-03 PROCEDURE — 92014 PR EYE EXAM, EST PATIENT,COMPREHESV: ICD-10-PCS | Mod: S$GLB,,, | Performed by: OPTOMETRIST

## 2020-03-03 NOTE — PROGRESS NOTES
HPI     Pt here for annual  No complaints about va  Wearing svl distance   Takes glasses off to read  Patient denies diplopia, headaches, flashes/floaters, pain, and   itching/burning/tearing.    Pt does not use any eye drops.      Last edited by Cris Hicks on 3/3/2020  2:46 PM. (History)            Assessment /Plan     For exam results, see Encounter Report.        Astigmatism of both eyes, unspecified type              Rx specs, continue with vertical prism                Personal history of malignant neoplasm of brain     Fourth nerve palsy of left eye  History of strabismus surgery  Pt reports surgery when she was 11 years old with Dr. Momo Lamb internal ocular [facility] today     RTC 1 year, sooner PRN

## 2020-03-31 ENCOUNTER — TELEPHONE (OUTPATIENT)
Dept: NEUROLOGY | Facility: CLINIC | Age: 40
End: 2020-03-31

## 2020-03-31 NOTE — TELEPHONE ENCOUNTER
Left a vm for patient. Letting her know that someone will be contacting her to r/s and/or cancel her appt. And, that she has an option to do an virtual visit. TR 3/31/2020

## 2020-04-01 ENCOUNTER — TELEPHONE (OUTPATIENT)
Dept: NEUROLOGY | Facility: CLINIC | Age: 40
End: 2020-04-01

## 2020-04-02 ENCOUNTER — TELEPHONE (OUTPATIENT)
Dept: NEUROLOGY | Facility: CLINIC | Age: 40
End: 2020-04-02

## 2020-04-07 ENCOUNTER — TELEPHONE (OUTPATIENT)
Dept: NEUROLOGY | Facility: CLINIC | Age: 40
End: 2020-04-07

## 2020-04-07 NOTE — TELEPHONE ENCOUNTER
Contacted pt several times to convert appointment to VV. No answer to voicemail. Appointment will be canceled as COVID-19 concerns. Pt will be contacted to r/s at a later date and time.

## 2020-04-14 ENCOUNTER — PATIENT MESSAGE (OUTPATIENT)
Dept: NEUROLOGY | Facility: CLINIC | Age: 40
End: 2020-04-14

## 2020-04-23 ENCOUNTER — OFFICE VISIT (OUTPATIENT)
Dept: NEUROLOGY | Facility: CLINIC | Age: 40
End: 2020-04-23
Payer: COMMERCIAL

## 2020-04-23 DIAGNOSIS — G43.711 CHRONIC MIGRAINE WITHOUT AURA, WITH INTRACTABLE MIGRAINE, SO STATED, WITH STATUS MIGRAINOSUS: Primary | ICD-10-CM

## 2020-04-23 DIAGNOSIS — G44.86 CERVICOGENIC HEADACHE: ICD-10-CM

## 2020-04-23 DIAGNOSIS — G43.901 STATUS MIGRAINOSUS: ICD-10-CM

## 2020-04-23 DIAGNOSIS — M54.81 BILATERAL OCCIPITAL NEURALGIA: ICD-10-CM

## 2020-04-23 PROCEDURE — 99213 PR OFFICE/OUTPT VISIT, EST, LEVL III, 20-29 MIN: ICD-10-PCS | Mod: 95,,, | Performed by: PSYCHIATRY & NEUROLOGY

## 2020-04-23 PROCEDURE — 99213 OFFICE O/P EST LOW 20 MIN: CPT | Mod: 95,,, | Performed by: PSYCHIATRY & NEUROLOGY

## 2020-04-23 RX ORDER — GABAPENTIN 300 MG/1
600 CAPSULE ORAL NIGHTLY
Qty: 180 CAPSULE | Refills: 6 | Status: SHIPPED | OUTPATIENT
Start: 2020-04-23 | End: 2020-12-16 | Stop reason: SDUPTHER

## 2020-04-23 RX ORDER — NARATRIPTAN 2.5 MG/1
TABLET ORAL
Qty: 9 TABLET | Refills: 6 | Status: SHIPPED | OUTPATIENT
Start: 2020-04-23 | End: 2020-12-16

## 2020-04-23 RX ORDER — SUMATRIPTAN SUCCINATE 100 MG/1
100 TABLET ORAL
Qty: 9 TABLET | Refills: 6 | Status: SHIPPED | OUTPATIENT
Start: 2020-04-23 | End: 2021-07-10

## 2020-04-23 RX ORDER — METHYLPREDNISOLONE 4 MG/1
TABLET ORAL
Qty: 1 PACKAGE | Refills: 0 | Status: SHIPPED | OUTPATIENT
Start: 2020-04-23 | End: 2020-12-16

## 2020-04-23 RX ORDER — NAPROXEN 500 MG/1
500 TABLET ORAL 2 TIMES DAILY PRN
Qty: 20 TABLET | Refills: 6 | Status: SHIPPED | OUTPATIENT
Start: 2020-04-23 | End: 2021-07-10 | Stop reason: ALTCHOICE

## 2020-04-23 RX ORDER — PROPRANOLOL HYDROCHLORIDE 40 MG/1
40 TABLET ORAL 2 TIMES DAILY
Qty: 60 TABLET | Refills: 6 | Status: SHIPPED | OUTPATIENT
Start: 2020-04-23 | End: 2020-06-22

## 2020-04-23 NOTE — PROGRESS NOTES
The patient location is: Home  The chief complaint leading to consultation is: headache  Visit type: Virtual visit with synchronous audio and video  Total time spent with patient: 15  Each patient to whom he or she provides medical services by telemedicine is:  (1) informed of the relationship between the physician and patient and the respective role of any other health care provider with respect to management of the patient; and (2) notified that he or she may decline to receive medical services by telemedicine and may withdraw from such care at any time.    Subjective:       Patient ID: Silke Fulton is a 39 y.o. female.    Interval History:  Silke Fulton is here for follow up. Their condition has clinically worsened.  She struck herself on the top and back of her head with a cup take care ear on 04/10/2020.  She notes she has been having a daily headaches since that time.  She initially tried anti-inflammatory over-the-counter medications but notes that this is not been helping.  We have discussed that due to the fact that the over-the-counter remedies have not helped we can discuss a pharmacologic intervention but we have also discussed not trying to adjust or wean her medications which has been the goal since our last visit in September.      Objective:   Patient is awake, alert and oriented to person, place and time. Moves all extremities antigravity. Cranial Nerves 2-12 are without gross focal deficit.     Focused telemedicine examination was undertaken today. Over 50% of face to face time of 15 minute visit time was in giving guidance, counseling and discussing treatment options.    Results for orders placed or performed during the hospital encounter of 12/22/17   MRI Brain W WO Contrast    Narrative    Procedure: MRI the brain with andwithout contrast.    Technique: Sagittal and axial T1, axial T2, axial FLAIR, axial gradient, axial diffusion, and axial, sagittal, and coronal  postcontrast T1 images of the whole brain. 8  ml of Gadavist injected intravenously.         Comparison: 09/15/2015    Findings: There is remote operative change from suboccipital craniotomy for parasagittal posterior fossa lesion resection there is continued encephalomalacia of the cerebellum with subtle margin of T2 flair signal hyperintensity in the adjacent parenchyma suggestive for gliosis and scarring. There is no evidence for new prior focal signal abnormality or enhancement to suggest worsening residual or recurrent lesion.    The ventricles are stable without hydrocephalus. There is mild prominence of the posterior horn the lateral ventricles symmetrically which may be sequela of prior hydrocephalus.    Punctate foci susceptibility posterior fossa suggestive for remote blood products primarily along the margin of the fourth ventricular resection cavity. Measured cranial T2 flow voids are present.    Impression     Remote operative change from suboccipital craniotomy with continued right cerebellar encephalomalacia. Continued and relatively stable subtle flair hyperintensity margin of the cerebellum suggestive for gliosis and scarring. No evidence for new signal abnormality or enhancement to suggest residual or recurrent mass.    Stable configuration of ventricles with mild prominence of the posterior horns lateral ventricles and moderate prominence of the fourth ventricle.    Please note there is mild mucosal thickening and probable small aerated secretions within the left maxillary antra cannot exclude component of acute sinusitis.        .      Electronically signed by: SHELBY CUBA DO  Date:     12/22/17  Time:    12:17    Results for orders placed or performed during the hospital encounter of 08/04/14   CT Head Without Contrast    Narrative    Comparison is made to a cranial CT exam dated 3/15/04, as well as to a more recent cranial MR exam of 12/10/07.    Postoperative changes are again identified in  the posterior fossa, with an occipital calvarial defect observed and with a sizeable area of hypoattenuation representing local porencephaly/gliotic scarring seen within the superomedial aspect of the right   cerebellar hemisphere/superior cerebellar vermis.  This area of encephalomalacia is unchanged from the examinations noted above.  There is some associated prominence of the CSF spaces over the right cerebellar hemisphere, as before.  There is moderate   enlargement of both lateral ventricles and enlargement of the fourth ventricle identified, but the size of the ventricular system is unchanged from the previous examinations, and the ventricles are normal in position without midline shift.  Well defined,   normal appearing sulci are identified over both cerebral convexities.  Parenchymal brain attenuation demonstrates no detrimental interval change since the 2004 CT exam.  No evidence of recent intracranial hemorrhage.  No new areas of hypoattenuation   indicating recent cerebral ischemia/infarction.  No subdural collections.  No findings on the noncontrast cranial CT to specifically suggest recurrent neoplasm.  Incidental note is made of mucosal thickening in the posterior ethmoid air cells on the   right side.    Impression     Noncontrast cranial CT examination demonstrates postoperative changes related to the posterior fossa as discussed above.  There has been no significant detrimental interval change since the cranial CT of 3/15/04.           Electronically signed by: Avi Rivas MD  Date:     08/05/14  Time:    05:26        Assessment/Plan:     Problem List Items Addressed This Visit        Neuro    Chronic migraine without aura, with intractable migraine, so stated, with status migrainosus - Primary    Overview     Preventive - gabapentin QHS, propranolol BID  Rescue - Imitrex/Naproxen, Amerge, Ketorolac  Triggers - weather, humidity, stress, hormones    1-4 days a month             Relevant Medications     gabapentin (NEURONTIN) 300 MG capsule    propranoloL (INDERAL) 40 MG tablet    sumatriptan (IMITREX) 100 MG tablet    naproxen (EC NAPROSYN) 500 MG EC tablet    naratriptan (AMERGE) 2.5 MG tablet    Cervicogenic headache    Relevant Medications    naproxen (EC NAPROSYN) 500 MG EC tablet       Orthopedic    Occipital neuralgia    Relevant Medications    gabapentin (NEURONTIN) 300 MG capsule    naproxen (EC NAPROSYN) 500 MG EC tablet      Other Visit Diagnoses     Status migrainosus        Relevant Medications    methylPREDNISolone (MEDROL DOSEPACK) 4 mg tablet        39-year-old female presents for evaluation and follow-up of complex multifactorial headaches.  At this time I will not continue to wean any medications for her.  We can discuss this at her next visit with me in 6 months.  I will send her in a steroid pack to break her current headache cycle.  If this does not help I have asked her to message me and we may think about physical therapy for what I seem to be some whiplash.  She does not have any other features that make me think that this is an undiagnosed concussion.  She is able to work from home without any cognitive slowing, mood changes, dizziness or any other symptoms.    The patient verbalizes understanding and agreement with the treatment plan. I have discussed risks, benefits and alternatives to the treatment plan. Questions were sought and answered to her stated verbal satisfaction.        Lakhwinder Luther MD    This note is dictated on M*Modal Fluency Direct word recognition program. There are word recognition mistakes that are occasionally missed on review.

## 2020-06-15 ENCOUNTER — OFFICE VISIT (OUTPATIENT)
Dept: DERMATOLOGY | Facility: CLINIC | Age: 40
End: 2020-06-15
Payer: COMMERCIAL

## 2020-06-15 DIAGNOSIS — D48.5 NEOPLASM OF UNCERTAIN BEHAVIOR OF SKIN: Primary | ICD-10-CM

## 2020-06-15 DIAGNOSIS — L91.8 SKIN TAG: ICD-10-CM

## 2020-06-15 PROCEDURE — 88305 TISSUE EXAM BY PATHOLOGIST: ICD-10-PCS | Mod: 26,,, | Performed by: PATHOLOGY

## 2020-06-15 PROCEDURE — 99999 PR PBB SHADOW E&M-EST. PATIENT-LVL IV: CPT | Mod: PBBFAC,,, | Performed by: DERMATOLOGY

## 2020-06-15 PROCEDURE — 99201 PR OFFICE/OUTPT VISIT,NEW,LEVL I: CPT | Mod: 25,S$GLB,, | Performed by: DERMATOLOGY

## 2020-06-15 PROCEDURE — 88305 TISSUE EXAM BY PATHOLOGIST: CPT | Mod: 26,,, | Performed by: PATHOLOGY

## 2020-06-15 PROCEDURE — 11102 PR TANGENTIAL BIOPSY, SKIN, SINGLE LESION: ICD-10-PCS | Mod: S$GLB,,, | Performed by: DERMATOLOGY

## 2020-06-15 PROCEDURE — 99999 PR PBB SHADOW E&M-EST. PATIENT-LVL IV: ICD-10-PCS | Mod: PBBFAC,,, | Performed by: DERMATOLOGY

## 2020-06-15 PROCEDURE — 88304 TISSUE EXAM BY PATHOLOGIST: CPT | Performed by: PATHOLOGY

## 2020-06-15 PROCEDURE — 11102 TANGNTL BX SKIN SINGLE LES: CPT | Mod: S$GLB,,, | Performed by: DERMATOLOGY

## 2020-06-15 PROCEDURE — 99201 PR OFFICE/OUTPT VISIT,NEW,LEVL I: ICD-10-PCS | Mod: 25,S$GLB,, | Performed by: DERMATOLOGY

## 2020-06-15 NOTE — PATIENT INSTRUCTIONS
" Shave Biopsy Wound Care    Your doctor has performed a shave biopsy today.  A band aid and vaseline ointment has been placed over the site.  This should remain in place for 24 hours.  It is recommended that you keep the area dry for the first 24 hours.  After 24 hours, you may remove the band aid and wash the area with warm soap and water and apply Vaseline jelly.  Many patients prefer to use Neosporin or Bacitracin ointment.  This is acceptable; however, know that you can develop an allergy to this medication even if you have used it safely for years.  It is important to keep the area moist.  Letting it dry out and get air slows healing time, and will worsen the scar.  Band aid is optional after first 24 hours.      If you notice increasing redness, tenderness, pain, or yellow drainage at the biopsy site, please notify your doctor.  These are signs of an infection.    If your biopsy site is bleeding, apply firm pressure for 15 minutes straight.  Repeat for another 15 minutes, if it is still bleeding.   If the surgical site continues to bleed, then please contact your doctor.      For [facility] users:   You will receive a [facility] notification after the pathologist has finished reviewing your biopsy specimen. Pathology results, however, will not be released online so you will see a "no content" message. Once your dermatologist reviews and clinically correlates your biopsy results, you will either receive a letter in the mail with the results of a phone call from your doctor's office if further explanation or treatment is warranted.       [address] [place] [address]/ [phone] FAX/ [email or web address]    XEROSIS (DRY SKIN)        1. Definition    Xerosis is the term for dry skin.  We all have a natural oil coating over our skin produced by the skin oil glands.  If this oil is removed, the skin becomes dry which can lead to cracking, which can lead to inflammation.  Xerosis is " usually a long-term problem that recurs often, especially in the winter.    2. Cause     Long hot baths or showers can remove our natural oil and lead to xerosis.  One should never take more than one bath or shower a day and for no longer than ten minutes.   Use of harsh soaps such as Zest, Dial, and Ivory can worsen and cause xerosis.   Cold winter weather worsens xerosis because the amount of moisture contained in cold air is much less than the amount of moisture in warm air.    3. Treatment     Treatment is intended to restore the natural oil to your skin.  Keep the skin lubricated.     Do not take more than one bath or shower a day.  Use lukewarm water, not hot.  Hot water dries out the skin.     Use a gentle moisturizing soap such as Cetaphil soap, Oil of Olay, Dove, Basis, Ivory moisture care, Restoraderm cleanser.     When toweling dry, dont rub.  Blot the skin so there is still some water left on the skin.  You should apply a moisturizing cream to all of the skin such as Cerave cream, Cetaphil cream, Restoraderm or Eucerin Original Formula cream.   Alpha hydroxyacid lotions, i.e., AmLactin, also work very well for preventing dry skin, but may burn when used on inflamed or reddened skin.     If you like to swim during the winter months, you should not use soap when getting out of the pool.  When you have finished swimming, rinse off the chlorine with cool to warm water.  If this will be the only shower of the day, then you may use Cetaphil or another mild soap to cleanse your skin.  After the shower, apply a moisturizing cream to all of the skin as above.        [address] [facility] [place] [address]/ [phone] FAX/ [email or web address]

## 2020-06-15 NOTE — PROGRESS NOTES
Subjective:       Patient ID:  Silke Fulton is a 39 y.o. female who presents for   Chief Complaint   Patient presents with    Lesion     left ankle    Skin Tags     neck     Patient complains of lesion(s)  Location: left ankle  Duration: 2 years - mom noticed it recently  Symptoms: tender when hit with razor or when  picked at it few months ago  Relieving factors/Previous treatments: none    Patient complains of lesion(s)  Location: skin tag - neck  Duration: 3 years  Symptoms: irritated by friction  Relieving factors/Previous treatments: none          Review of Systems   Cardiovascular: Positive for pedal edema (intermittent).   Skin: Positive for itching (intermittent). Negative for rash.   Hematologic/Lymphatic: Does not bruise/bleed easily.        Objective:    Physical Exam   Constitutional: She appears well-developed and well-nourished. No distress.   Neurological: She is alert and oriented to person, place, and time. She is not disoriented.   Psychiatric: She has a normal mood and affect.   Skin:   Areas Examined (abnormalities noted in diagram):   Neck Inspection Performed  LLE Inspection Performed                   Diagram Legend     Erythematous scaling macule/papule c/w actinic keratosis       Vascular papule c/w angioma      Pigmented verrucoid papule/plaque c/w seborrheic keratosis      Yellow umbilicated papule c/w sebaceous hyperplasia      Irregularly shaped tan macule c/w lentigo     1-2 mm smooth white papules consistent with Milia      Movable subcutaneous cyst with punctum c/w epidermal inclusion cyst      Subcutaneous movable cyst c/w pilar cyst      Firm pink to brown papule c/w dermatofibroma      Pedunculated fleshy papule(s) c/w skin tag(s)      Evenly pigmented macule c/w junctional nevus     Mildly variegated pigmented, slightly irregular-bordered macule c/w mildly atypical nevus      Flesh colored to evenly pigmented papule c/w intradermal nevus       Pink pearly  papule/plaque c/w basal cell carcinoma      Erythematous hyperkeratotic cursted plaque c/w SCC      Surgical scar with no sign of skin cancer recurrence      Open and closed comedones      Inflammatory papules and pustules      Verrucoid papule consistent consistent with wart     Erythematous eczematous patches and plaques     Dystrophic onycholytic nail with subungual debris c/w onychomycosis     Umbilicated papule    Erythematous-base heme-crusted tan verrucoid plaque consistent with inflamed seborrheic keratosis     Erythematous Silvery Scaling Plaque c/w Psoriasis     See annotation              Assessment / Plan:      Pathology Orders:     Normal Orders This Visit    Specimen to Pathology, Dermatology     Questions:    Procedure Type: Dermatology and skin neoplasms    Number of Specimens: 1    ------------------------: -------------------------    Spec 1 Procedure: Biopsy    Spec 1 Clinical Impression: r/o DF vs other adnexal neoplasm    Spec 1 Source: left lower lateral leg        Neoplasm of uncertain behavior of skin  -     Specimen to Pathology, Dermatology    Shave biopsy procedure note:    Shave biopsy performed after verbal consent including risk of infection, scar, recurrence, need for additional treatment of site. Area prepped with alcohol, anesthetized with approximately 1.0cc of 1% lidocaine with epinephrine. Lesional tissue shaved with razor blade. Hemostasis achieved with application of aluminum chloride followed by hyfrecation. No complications. Dressing applied. Wound care explained.    Discussed possibility of delayed wound healing 2/2 intermittent pedal edema    Skin tag - left neck  Reassurance given to patient. No treatment is necessary.   Treatment of benign, asymptomatic lesions may be considered cosmetic.               Follow up if symptoms worsen or fail to improve.

## 2020-06-19 LAB
FINAL PATHOLOGIC DIAGNOSIS: NORMAL
GROSS: NORMAL

## 2020-07-21 ENCOUNTER — LAB VISIT (OUTPATIENT)
Dept: INTERNAL MEDICINE | Facility: CLINIC | Age: 40
End: 2020-07-21
Payer: COMMERCIAL

## 2020-07-21 DIAGNOSIS — R19.7 DIARRHEA: ICD-10-CM

## 2020-07-21 DIAGNOSIS — R51.9 HEAD ACHE: ICD-10-CM

## 2020-07-21 DIAGNOSIS — R05.9 COUGH: ICD-10-CM

## 2020-07-21 PROCEDURE — U0003 INFECTIOUS AGENT DETECTION BY NUCLEIC ACID (DNA OR RNA); SEVERE ACUTE RESPIRATORY SYNDROME CORONAVIRUS 2 (SARS-COV-2) (CORONAVIRUS DISEASE [COVID-19]), AMPLIFIED PROBE TECHNIQUE, MAKING USE OF HIGH THROUGHPUT TECHNOLOGIES AS DESCRIBED BY CMS-2020-01-R: HCPCS

## 2020-07-22 LAB — SARS-COV-2 RNA RESP QL NAA+PROBE: NOT DETECTED

## 2020-07-23 ENCOUNTER — TELEPHONE (OUTPATIENT)
Dept: INTERNAL MEDICINE | Facility: CLINIC | Age: 40
End: 2020-07-23

## 2020-07-23 ENCOUNTER — PATIENT MESSAGE (OUTPATIENT)
Dept: INTERNAL MEDICINE | Facility: CLINIC | Age: 40
End: 2020-07-23

## 2020-07-23 NOTE — TELEPHONE ENCOUNTER
"----- Message from Alejandra Avelar MD sent at 7/23/2020 11:50 AM CDT -----  Please note that your Covid- 19 antigen test was negative.  Please continue to wear your mask in public, practice social distancing,  And wash your hands frequently for at least 20" under running water.  Alejandra Gifford.  "

## 2020-07-24 ENCOUNTER — PATIENT MESSAGE (OUTPATIENT)
Dept: INTERNAL MEDICINE | Facility: CLINIC | Age: 40
End: 2020-07-24

## 2020-07-24 RX ORDER — BENZONATATE 200 MG/1
200 CAPSULE ORAL 3 TIMES DAILY PRN
Qty: 30 CAPSULE | Refills: 0 | Status: SHIPPED | OUTPATIENT
Start: 2020-07-24 | End: 2020-08-03

## 2020-10-05 ENCOUNTER — PATIENT MESSAGE (OUTPATIENT)
Dept: ADMINISTRATIVE | Facility: HOSPITAL | Age: 40
End: 2020-10-05

## 2020-11-10 ENCOUNTER — PATIENT OUTREACH (OUTPATIENT)
Dept: ADMINISTRATIVE | Facility: HOSPITAL | Age: 40
End: 2020-11-10

## 2020-12-16 ENCOUNTER — OFFICE VISIT (OUTPATIENT)
Dept: NEUROLOGY | Facility: CLINIC | Age: 40
End: 2020-12-16
Payer: COMMERCIAL

## 2020-12-16 ENCOUNTER — TELEPHONE (OUTPATIENT)
Dept: PHARMACY | Facility: CLINIC | Age: 40
End: 2020-12-16

## 2020-12-16 VITALS
HEART RATE: 78 BPM | HEIGHT: 62 IN | SYSTOLIC BLOOD PRESSURE: 105 MMHG | DIASTOLIC BLOOD PRESSURE: 73 MMHG | BODY MASS INDEX: 36.23 KG/M2 | WEIGHT: 196.88 LBS

## 2020-12-16 DIAGNOSIS — M54.81 BILATERAL OCCIPITAL NEURALGIA: ICD-10-CM

## 2020-12-16 DIAGNOSIS — G43.711 CHRONIC MIGRAINE WITHOUT AURA, WITH INTRACTABLE MIGRAINE, SO STATED, WITH STATUS MIGRAINOSUS: Primary | ICD-10-CM

## 2020-12-16 PROCEDURE — 3008F PR BODY MASS INDEX (BMI) DOCUMENTED: ICD-10-PCS | Mod: CPTII,S$GLB,, | Performed by: PSYCHIATRY & NEUROLOGY

## 2020-12-16 PROCEDURE — 1126F PR PAIN SEVERITY QUANTIFIED, NO PAIN PRESENT: ICD-10-PCS | Mod: S$GLB,,, | Performed by: PSYCHIATRY & NEUROLOGY

## 2020-12-16 PROCEDURE — 1126F AMNT PAIN NOTED NONE PRSNT: CPT | Mod: S$GLB,,, | Performed by: PSYCHIATRY & NEUROLOGY

## 2020-12-16 PROCEDURE — 99214 OFFICE O/P EST MOD 30 MIN: CPT | Mod: S$GLB,,, | Performed by: PSYCHIATRY & NEUROLOGY

## 2020-12-16 PROCEDURE — 99214 PR OFFICE/OUTPT VISIT, EST, LEVL IV, 30-39 MIN: ICD-10-PCS | Mod: S$GLB,,, | Performed by: PSYCHIATRY & NEUROLOGY

## 2020-12-16 PROCEDURE — 99999 PR PBB SHADOW E&M-EST. PATIENT-LVL III: ICD-10-PCS | Mod: PBBFAC,,, | Performed by: PSYCHIATRY & NEUROLOGY

## 2020-12-16 PROCEDURE — 99999 PR PBB SHADOW E&M-EST. PATIENT-LVL III: CPT | Mod: PBBFAC,,, | Performed by: PSYCHIATRY & NEUROLOGY

## 2020-12-16 PROCEDURE — 3008F BODY MASS INDEX DOCD: CPT | Mod: CPTII,S$GLB,, | Performed by: PSYCHIATRY & NEUROLOGY

## 2020-12-16 RX ORDER — PROPRANOLOL HYDROCHLORIDE 40 MG/1
40 TABLET ORAL 2 TIMES DAILY
Qty: 180 TABLET | Refills: 3 | Status: SHIPPED | OUTPATIENT
Start: 2020-12-16 | End: 2022-03-02

## 2020-12-16 RX ORDER — RIMEGEPANT SULFATE 75 MG/75MG
75 TABLET, ORALLY DISINTEGRATING ORAL ONCE AS NEEDED
Qty: 10 TABLET | Refills: 3 | Status: SHIPPED | OUTPATIENT
Start: 2020-12-16 | End: 2020-12-19

## 2020-12-16 RX ORDER — GABAPENTIN 300 MG/1
600 CAPSULE ORAL NIGHTLY
Qty: 180 CAPSULE | Refills: 3 | Status: SHIPPED | OUTPATIENT
Start: 2020-12-16 | End: 2022-03-02

## 2020-12-16 RX ORDER — CETIRIZINE HYDROCHLORIDE 10 MG/1
10 TABLET ORAL DAILY
COMMUNITY

## 2020-12-16 NOTE — PROGRESS NOTES
Subjective:       Patient ID: Silke Fulton is a 40 y.o. female.    Reason for Consult: Follow-up      Interval History:  Silke Fulton is here for follow up. Their condition is clinically stable.  She notes that she is dealing with headaches once a week up to 4 times a month.  She notes that her rescue medication seem to be less helpful than they were before.  She notes that she had previously tried and failed Imitrex, naproxen, Maxalt, emerge, ketorolac.  We have discussed newer options on today's visit.  She notes she is still doing well with propanolol and gabapentin.      Objective:     Vitals:    12/16/20 1023   BP: 105/73   Pulse: 78     Patient is awake alert oriented to person place and time.  Moves all 4 extremities against gravity.  Gait and station within normal limits.  Cranial nerves 2-12 were without focal deficits.  Focused examination was undertaken today. Over 50% of face to face time of 25 minute visit time was in giving guidance, counseling and discussing treatment options.    Assessment/Plan:     Problem List Items Addressed This Visit        Neuro    Chronic migraine without aura, with intractable migraine, so stated, with status migrainosus - Primary    Overview     Preventive - gabapentin QHS, propranolol BID  Rescue - Imitrex/Naproxen, Ketorolac (has failed Imitrex, Maxalt, Amerge, Naproxen, Ketorolac)  Triggers - weather, humidity, stress, hormones    1-4 days a month    Trial Nurtec         Relevant Medications    rimegepant (NURTEC) 75 mg odt    propranoloL (INDERAL) 40 MG tablet    gabapentin (NEURONTIN) 300 MG capsule       Orthopedic    Occipital neuralgia    Relevant Medications    gabapentin (NEURONTIN) 300 MG capsule        40-year-old female presents for evaluation and follow-up of complex multifactorial headaches.  At this time I will have her continue her gabapentin, 600 mg p.o. q.h.s. as well as her propanolol, 40 mg b.i.d..  I will have her try Nurtec 75  mg p.o. b.i.d. p.r.n. migraine.  If this is effective for her I would have her switch her rescue medication to this only Mary banded her combination of Imitrex and naproxen.  I will see her back in a year or sooner should her headaches change.  At that time we will consider weaning her off of 1 of her agents.  We have discussed trying to wean her gabapentin today because she complained of some foot swelling but she has determined that the risk of subjecting herself to more headaches is not worth the benefit of reduction of intermittent foot swelling.    The patient verbalizes understanding and agreement with the treatment plan. I have discussed risks, benefits and alternatives to the treatment plan. Questions were sought and answered to her stated verbal satisfaction.        Lakhwinder Luther MD    This note is dictated on M*Modal Fluency Direct word recognition program. There are word recognition mistakes that are occasionally missed on review.

## 2020-12-18 ENCOUNTER — TELEPHONE (OUTPATIENT)
Dept: PHARMACY | Facility: CLINIC | Age: 40
End: 2020-12-18

## 2021-01-04 ENCOUNTER — PATIENT MESSAGE (OUTPATIENT)
Dept: ADMINISTRATIVE | Facility: HOSPITAL | Age: 41
End: 2021-01-04

## 2021-02-03 ENCOUNTER — OFFICE VISIT (OUTPATIENT)
Dept: OBSTETRICS AND GYNECOLOGY | Facility: CLINIC | Age: 41
End: 2021-02-03
Payer: COMMERCIAL

## 2021-02-03 VITALS
BODY MASS INDEX: 36.17 KG/M2 | SYSTOLIC BLOOD PRESSURE: 102 MMHG | DIASTOLIC BLOOD PRESSURE: 62 MMHG | WEIGHT: 197.75 LBS

## 2021-02-03 DIAGNOSIS — Z12.31 ENCOUNTER FOR SCREENING MAMMOGRAM FOR MALIGNANT NEOPLASM OF BREAST: ICD-10-CM

## 2021-02-03 DIAGNOSIS — N95.9 MENOPAUSAL AND PERIMENOPAUSAL DISORDER: ICD-10-CM

## 2021-02-03 DIAGNOSIS — Z01.419 VISIT FOR GYNECOLOGIC EXAMINATION: Primary | ICD-10-CM

## 2021-02-03 PROCEDURE — 3008F PR BODY MASS INDEX (BMI) DOCUMENTED: ICD-10-PCS | Mod: CPTII,S$GLB,, | Performed by: OBSTETRICS & GYNECOLOGY

## 2021-02-03 PROCEDURE — 99396 PREV VISIT EST AGE 40-64: CPT | Mod: S$GLB,,, | Performed by: OBSTETRICS & GYNECOLOGY

## 2021-02-03 PROCEDURE — 87624 HPV HI-RISK TYP POOLED RSLT: CPT

## 2021-02-03 PROCEDURE — 99999 PR PBB SHADOW E&M-EST. PATIENT-LVL II: ICD-10-PCS | Mod: PBBFAC,,, | Performed by: OBSTETRICS & GYNECOLOGY

## 2021-02-03 PROCEDURE — 3008F BODY MASS INDEX DOCD: CPT | Mod: CPTII,S$GLB,, | Performed by: OBSTETRICS & GYNECOLOGY

## 2021-02-03 PROCEDURE — 99396 PR PREVENTIVE VISIT,EST,40-64: ICD-10-PCS | Mod: S$GLB,,, | Performed by: OBSTETRICS & GYNECOLOGY

## 2021-02-03 PROCEDURE — 88175 CYTOPATH C/V AUTO FLUID REDO: CPT

## 2021-02-03 PROCEDURE — 99999 PR PBB SHADOW E&M-EST. PATIENT-LVL II: CPT | Mod: PBBFAC,,, | Performed by: OBSTETRICS & GYNECOLOGY

## 2021-02-08 LAB
HPV HR 12 DNA SPEC QL NAA+PROBE: NEGATIVE
HPV16 AG SPEC QL: NEGATIVE
HPV18 DNA SPEC QL NAA+PROBE: NEGATIVE

## 2021-03-02 LAB
FINAL PATHOLOGIC DIAGNOSIS: NORMAL
Lab: NORMAL

## 2021-03-07 ENCOUNTER — PATIENT MESSAGE (OUTPATIENT)
Dept: OBSTETRICS AND GYNECOLOGY | Facility: CLINIC | Age: 41
End: 2021-03-07

## 2021-03-26 ENCOUNTER — OFFICE VISIT (OUTPATIENT)
Dept: OPTOMETRY | Facility: CLINIC | Age: 41
End: 2021-03-26
Payer: COMMERCIAL

## 2021-03-26 ENCOUNTER — HOSPITAL ENCOUNTER (OUTPATIENT)
Dept: RADIOLOGY | Facility: HOSPITAL | Age: 41
Discharge: HOME | End: 2021-03-26
Attending: OBSTETRICS & GYNECOLOGY
Payer: COMMERCIAL

## 2021-03-26 VITALS — BODY MASS INDEX: 36.25 KG/M2 | HEIGHT: 62 IN | WEIGHT: 197 LBS

## 2021-03-26 DIAGNOSIS — Z12.31 ENCOUNTER FOR SCREENING MAMMOGRAM FOR MALIGNANT NEOPLASM OF BREAST: ICD-10-CM

## 2021-03-26 DIAGNOSIS — Z98.890 HISTORY OF STRABISMUS SURGERY: ICD-10-CM

## 2021-03-26 DIAGNOSIS — H52.203 ASTIGMATISM OF BOTH EYES, UNSPECIFIED TYPE: Primary | ICD-10-CM

## 2021-03-26 DIAGNOSIS — Z85.841 PERSONAL HISTORY OF MALIGNANT NEOPLASM OF BRAIN: ICD-10-CM

## 2021-03-26 DIAGNOSIS — H49.12 FOURTH NERVE PALSY OF LEFT EYE: ICD-10-CM

## 2021-03-26 PROCEDURE — 77067 MAMMO DIGITAL SCREENING BILAT WITH TOMO: ICD-10-PCS | Mod: 26,,, | Performed by: RADIOLOGY

## 2021-03-26 PROCEDURE — 77063 MAMMO DIGITAL SCREENING BILAT WITH TOMO: ICD-10-PCS | Mod: 26,,, | Performed by: RADIOLOGY

## 2021-03-26 PROCEDURE — 92015 DETERMINE REFRACTIVE STATE: CPT | Mod: S$GLB,,, | Performed by: OPTOMETRIST

## 2021-03-26 PROCEDURE — 92014 COMPRE OPH EXAM EST PT 1/>: CPT | Mod: S$GLB,,, | Performed by: OPTOMETRIST

## 2021-03-26 PROCEDURE — 92015 PR REFRACTION: ICD-10-PCS | Mod: S$GLB,,, | Performed by: OPTOMETRIST

## 2021-03-26 PROCEDURE — 77063 BREAST TOMOSYNTHESIS BI: CPT | Mod: 26,,, | Performed by: RADIOLOGY

## 2021-03-26 PROCEDURE — 92014 PR EYE EXAM, EST PATIENT,COMPREHESV: ICD-10-PCS | Mod: S$GLB,,, | Performed by: OPTOMETRIST

## 2021-03-26 PROCEDURE — 99999 PR PBB SHADOW E&M-EST. PATIENT-LVL III: CPT | Mod: PBBFAC,,, | Performed by: OPTOMETRIST

## 2021-03-26 PROCEDURE — 77067 SCR MAMMO BI INCL CAD: CPT | Mod: TC

## 2021-03-26 PROCEDURE — 99999 PR PBB SHADOW E&M-EST. PATIENT-LVL III: ICD-10-PCS | Mod: PBBFAC,,, | Performed by: OPTOMETRIST

## 2021-03-26 PROCEDURE — 77067 SCR MAMMO BI INCL CAD: CPT | Mod: 26,,, | Performed by: RADIOLOGY

## 2021-03-27 ENCOUNTER — PATIENT MESSAGE (OUTPATIENT)
Dept: OBSTETRICS AND GYNECOLOGY | Facility: CLINIC | Age: 41
End: 2021-03-27

## 2021-03-27 ENCOUNTER — IMMUNIZATION (OUTPATIENT)
Dept: PRIMARY CARE CLINIC | Facility: CLINIC | Age: 41
End: 2021-03-27
Payer: COMMERCIAL

## 2021-03-27 DIAGNOSIS — Z23 NEED FOR VACCINATION: Primary | ICD-10-CM

## 2021-03-27 PROCEDURE — 91300 PR SARS-COV- 2 COVID-19 VACCINE, NO PRSV, 30MCG/0.3ML, IM: ICD-10-PCS | Mod: S$GLB,,, | Performed by: INTERNAL MEDICINE

## 2021-03-27 PROCEDURE — 91300 PR SARS-COV- 2 COVID-19 VACCINE, NO PRSV, 30MCG/0.3ML, IM: CPT | Mod: S$GLB,,, | Performed by: INTERNAL MEDICINE

## 2021-03-27 PROCEDURE — 0001A PR IMMUNIZ ADMIN, SARS-COV-2 COVID-19 VACC, 30MCG/0.3ML, 1ST DOSE: ICD-10-PCS | Mod: CV19,S$GLB,, | Performed by: INTERNAL MEDICINE

## 2021-03-27 PROCEDURE — 0001A PR IMMUNIZ ADMIN, SARS-COV-2 COVID-19 VACC, 30MCG/0.3ML, 1ST DOSE: CPT | Mod: CV19,S$GLB,, | Performed by: INTERNAL MEDICINE

## 2021-03-27 RX ADMIN — Medication 0.3 ML: at 04:03

## 2021-04-14 ENCOUNTER — PATIENT MESSAGE (OUTPATIENT)
Dept: OBSTETRICS AND GYNECOLOGY | Facility: CLINIC | Age: 41
End: 2021-04-14

## 2021-04-17 ENCOUNTER — IMMUNIZATION (OUTPATIENT)
Dept: PRIMARY CARE CLINIC | Facility: CLINIC | Age: 41
End: 2021-04-17
Payer: COMMERCIAL

## 2021-04-17 DIAGNOSIS — Z23 NEED FOR VACCINATION: Primary | ICD-10-CM

## 2021-04-17 PROCEDURE — 91300 PR SARS-COV- 2 COVID-19 VACCINE, NO PRSV, 30MCG/0.3ML, IM: CPT | Mod: S$GLB,,, | Performed by: INTERNAL MEDICINE

## 2021-04-17 PROCEDURE — 0002A PR IMMUNIZ ADMIN, SARS-COV-2 COVID-19 VACC, 30MCG/0.3ML, 2ND DOSE: ICD-10-PCS | Mod: CV19,S$GLB,, | Performed by: INTERNAL MEDICINE

## 2021-04-17 PROCEDURE — 91300 PR SARS-COV- 2 COVID-19 VACCINE, NO PRSV, 30MCG/0.3ML, IM: ICD-10-PCS | Mod: S$GLB,,, | Performed by: INTERNAL MEDICINE

## 2021-04-17 PROCEDURE — 0002A PR IMMUNIZ ADMIN, SARS-COV-2 COVID-19 VACC, 30MCG/0.3ML, 2ND DOSE: CPT | Mod: CV19,S$GLB,, | Performed by: INTERNAL MEDICINE

## 2021-04-17 RX ADMIN — Medication 0.3 ML: at 01:04

## 2021-07-10 ENCOUNTER — OFFICE VISIT (OUTPATIENT)
Dept: URGENT CARE | Facility: CLINIC | Age: 41
End: 2021-07-10
Payer: COMMERCIAL

## 2021-07-10 VITALS
DIASTOLIC BLOOD PRESSURE: 73 MMHG | TEMPERATURE: 98 F | OXYGEN SATURATION: 96 % | SYSTOLIC BLOOD PRESSURE: 106 MMHG | WEIGHT: 180 LBS | BODY MASS INDEX: 33.13 KG/M2 | HEART RATE: 81 BPM | HEIGHT: 62 IN

## 2021-07-10 DIAGNOSIS — J06.9 UPPER RESPIRATORY TRACT INFECTION, UNSPECIFIED TYPE: Primary | ICD-10-CM

## 2021-07-10 LAB
CTP QC/QA: YES
SARS-COV-2 RDRP RESP QL NAA+PROBE: NEGATIVE

## 2021-07-10 PROCEDURE — 3008F PR BODY MASS INDEX (BMI) DOCUMENTED: ICD-10-PCS | Mod: CPTII,S$GLB,, | Performed by: FAMILY MEDICINE

## 2021-07-10 PROCEDURE — 99214 OFFICE O/P EST MOD 30 MIN: CPT | Mod: S$GLB,,, | Performed by: FAMILY MEDICINE

## 2021-07-10 PROCEDURE — 3008F BODY MASS INDEX DOCD: CPT | Mod: CPTII,S$GLB,, | Performed by: FAMILY MEDICINE

## 2021-07-10 PROCEDURE — U0002: ICD-10-PCS | Mod: QW,S$GLB,, | Performed by: FAMILY MEDICINE

## 2021-07-10 PROCEDURE — U0002 COVID-19 LAB TEST NON-CDC: HCPCS | Mod: QW,S$GLB,, | Performed by: FAMILY MEDICINE

## 2021-07-10 PROCEDURE — 99214 PR OFFICE/OUTPT VISIT, EST, LEVL IV, 30-39 MIN: ICD-10-PCS | Mod: S$GLB,,, | Performed by: FAMILY MEDICINE

## 2021-07-10 RX ORDER — PROMETHAZINE HYDROCHLORIDE AND DEXTROMETHORPHAN HYDROBROMIDE 6.25; 15 MG/5ML; MG/5ML
5 SYRUP ORAL NIGHTLY PRN
Qty: 118 ML | Refills: 0 | Status: SHIPPED | OUTPATIENT
Start: 2021-07-10 | End: 2021-07-20

## 2021-07-10 RX ORDER — KETOROLAC TROMETHAMINE 10 MG/1
TABLET, FILM COATED ORAL
COMMUNITY
End: 2021-07-10 | Stop reason: ALTCHOICE

## 2021-07-10 RX ORDER — FLUTICASONE PROPIONATE 50 MCG
1 SPRAY, SUSPENSION (ML) NASAL DAILY
Qty: 9.9 ML | Refills: 0 | Status: SHIPPED | OUTPATIENT
Start: 2021-07-10 | End: 2021-07-29

## 2021-07-29 ENCOUNTER — PATIENT MESSAGE (OUTPATIENT)
Dept: INTERNAL MEDICINE | Facility: CLINIC | Age: 41
End: 2021-07-29

## 2021-07-29 ENCOUNTER — OFFICE VISIT (OUTPATIENT)
Dept: FAMILY MEDICINE | Facility: CLINIC | Age: 41
End: 2021-07-29
Payer: COMMERCIAL

## 2021-07-29 ENCOUNTER — LAB VISIT (OUTPATIENT)
Dept: INTERNAL MEDICINE | Facility: CLINIC | Age: 41
End: 2021-07-29
Payer: COMMERCIAL

## 2021-07-29 DIAGNOSIS — Z20.822 EXPOSURE TO COVID-19 VIRUS: ICD-10-CM

## 2021-07-29 DIAGNOSIS — J01.90 ACUTE BACTERIAL SINUSITIS: Primary | ICD-10-CM

## 2021-07-29 DIAGNOSIS — J01.90 ACUTE BACTERIAL SINUSITIS: ICD-10-CM

## 2021-07-29 DIAGNOSIS — B96.89 ACUTE BACTERIAL SINUSITIS: Primary | ICD-10-CM

## 2021-07-29 DIAGNOSIS — B96.89 ACUTE BACTERIAL SINUSITIS: ICD-10-CM

## 2021-07-29 PROCEDURE — 1159F PR MEDICATION LIST DOCUMENTED IN MEDICAL RECORD: ICD-10-PCS | Mod: CPTII,,, | Performed by: FAMILY MEDICINE

## 2021-07-29 PROCEDURE — 99214 PR OFFICE/OUTPT VISIT, EST, LEVL IV, 30-39 MIN: ICD-10-PCS | Mod: 95,,, | Performed by: FAMILY MEDICINE

## 2021-07-29 PROCEDURE — U0005 INFEC AGEN DETEC AMPLI PROBE: HCPCS | Performed by: FAMILY MEDICINE

## 2021-07-29 PROCEDURE — 1159F MED LIST DOCD IN RCRD: CPT | Mod: CPTII,,, | Performed by: FAMILY MEDICINE

## 2021-07-29 PROCEDURE — U0003 INFECTIOUS AGENT DETECTION BY NUCLEIC ACID (DNA OR RNA); SEVERE ACUTE RESPIRATORY SYNDROME CORONAVIRUS 2 (SARS-COV-2) (CORONAVIRUS DISEASE [COVID-19]), AMPLIFIED PROBE TECHNIQUE, MAKING USE OF HIGH THROUGHPUT TECHNOLOGIES AS DESCRIBED BY CMS-2020-01-R: HCPCS | Performed by: FAMILY MEDICINE

## 2021-07-29 PROCEDURE — 1160F RVW MEDS BY RX/DR IN RCRD: CPT | Mod: CPTII,,, | Performed by: FAMILY MEDICINE

## 2021-07-29 PROCEDURE — 1160F PR REVIEW ALL MEDS BY PRESCRIBER/CLIN PHARMACIST DOCUMENTED: ICD-10-PCS | Mod: CPTII,,, | Performed by: FAMILY MEDICINE

## 2021-07-29 PROCEDURE — 99214 OFFICE O/P EST MOD 30 MIN: CPT | Mod: 95,,, | Performed by: FAMILY MEDICINE

## 2021-07-29 RX ORDER — AZELASTINE 1 MG/ML
1 SPRAY, METERED NASAL 2 TIMES DAILY
Qty: 30 ML | Refills: 0 | Status: SHIPPED | OUTPATIENT
Start: 2021-07-29 | End: 2022-02-09

## 2021-07-29 RX ORDER — AMOXICILLIN AND CLAVULANATE POTASSIUM 875; 125 MG/1; MG/1
1 TABLET, FILM COATED ORAL EVERY 12 HOURS
Qty: 14 TABLET | Refills: 0 | Status: SHIPPED | OUTPATIENT
Start: 2021-07-29 | End: 2021-08-05

## 2021-07-29 RX ORDER — AMOXICILLIN AND CLAVULANATE POTASSIUM 875; 125 MG/1; MG/1
1 TABLET, FILM COATED ORAL EVERY 12 HOURS
Qty: 14 TABLET | Refills: 0 | Status: SHIPPED | OUTPATIENT
Start: 2021-07-29 | End: 2021-07-29 | Stop reason: SDUPTHER

## 2021-07-29 RX ORDER — AZELASTINE 1 MG/ML
1 SPRAY, METERED NASAL 2 TIMES DAILY
Qty: 30 ML | Refills: 0 | Status: SHIPPED | OUTPATIENT
Start: 2021-07-29 | End: 2021-07-29 | Stop reason: SDUPTHER

## 2021-07-29 RX ORDER — BENZONATATE 100 MG/1
100 CAPSULE ORAL 3 TIMES DAILY PRN
Qty: 30 CAPSULE | Refills: 0 | Status: SHIPPED | OUTPATIENT
Start: 2021-07-29 | End: 2021-08-08

## 2021-07-30 LAB
SARS-COV-2 RNA RESP QL NAA+PROBE: NOT DETECTED
SARS-COV-2- CYCLE NUMBER: -1

## 2021-12-06 ENCOUNTER — TELEPHONE (OUTPATIENT)
Dept: OPHTHALMOLOGY | Facility: CLINIC | Age: 41
End: 2021-12-06
Payer: COMMERCIAL

## 2021-12-06 ENCOUNTER — OFFICE VISIT (OUTPATIENT)
Dept: OPHTHALMOLOGY | Facility: CLINIC | Age: 41
End: 2021-12-06
Payer: COMMERCIAL

## 2021-12-06 DIAGNOSIS — S05.01XA RIGHT CORNEAL ABRASION, INITIAL ENCOUNTER: Primary | ICD-10-CM

## 2021-12-06 PROCEDURE — 99202 PR OFFICE/OUTPT VISIT, NEW, LEVL II, 15-29 MIN: ICD-10-PCS | Mod: S$GLB,,, | Performed by: OPHTHALMOLOGY

## 2021-12-06 PROCEDURE — 99202 OFFICE O/P NEW SF 15 MIN: CPT | Mod: S$GLB,,, | Performed by: OPHTHALMOLOGY

## 2021-12-06 PROCEDURE — 99999 PR PBB SHADOW E&M-EST. PATIENT-LVL III: ICD-10-PCS | Mod: PBBFAC,,, | Performed by: OPHTHALMOLOGY

## 2021-12-06 PROCEDURE — 99999 PR PBB SHADOW E&M-EST. PATIENT-LVL III: CPT | Mod: PBBFAC,,, | Performed by: OPHTHALMOLOGY

## 2021-12-06 RX ORDER — OFLOXACIN 3 MG/ML
1 SOLUTION/ DROPS OPHTHALMIC 4 TIMES DAILY
Qty: 30 ML | Refills: 0 | Status: SHIPPED | OUTPATIENT
Start: 2021-12-06 | End: 2022-01-05

## 2022-01-04 ENCOUNTER — PATIENT MESSAGE (OUTPATIENT)
Dept: NEUROLOGY | Facility: CLINIC | Age: 42
End: 2022-01-04
Payer: COMMERCIAL

## 2022-01-04 ENCOUNTER — TELEPHONE (OUTPATIENT)
Dept: NEUROLOGY | Facility: CLINIC | Age: 42
End: 2022-01-04
Payer: COMMERCIAL

## 2022-01-04 ENCOUNTER — PATIENT MESSAGE (OUTPATIENT)
Dept: NEUROLOGY | Facility: CLINIC | Age: 42
End: 2022-01-04

## 2022-01-04 NOTE — TELEPHONE ENCOUNTER
----- Message from Jennifer Van sent at 1/4/2022  9:50 AM CST -----  Contact: [phone]  Pt tried getting online for her virtual this morning and it would not let her. Pt would like a call back.    [phone]    
Can you speak to this patient or would like like me to reschedule her a virtual appointment?  
Spontaneous, unlabored and symmetrical

## 2022-02-07 ENCOUNTER — TELEPHONE (OUTPATIENT)
Dept: NEUROLOGY | Facility: CLINIC | Age: 42
End: 2022-02-07
Payer: COMMERCIAL

## 2022-02-07 NOTE — TELEPHONE ENCOUNTER
----- Message from Carlyn Martinez sent at 2022  2:03 PM CST -----  Regarding: Nurtec  Type: Patient Call Back    Who called: Suyapa with Nurtec prior auth     What is the request in detail: rimegepant (NURTEC) 75 mg odt  - checking on stat of prior auth from 2022     Can the clinic reply by [facility]? Call     Would the patient rather a call back or a response via [facility]? Call   Best call back number: [phone] ext [phone]  Please leave message with patients name,  and stat of auth if possible

## 2022-02-09 ENCOUNTER — OFFICE VISIT (OUTPATIENT)
Dept: OBSTETRICS AND GYNECOLOGY | Facility: CLINIC | Age: 42
End: 2022-02-09
Payer: COMMERCIAL

## 2022-02-09 VITALS — BODY MASS INDEX: 36.31 KG/M2 | WEIGHT: 198.5 LBS | SYSTOLIC BLOOD PRESSURE: 120 MMHG | DIASTOLIC BLOOD PRESSURE: 84 MMHG

## 2022-02-09 DIAGNOSIS — Z01.419 VISIT FOR GYNECOLOGIC EXAMINATION: Primary | ICD-10-CM

## 2022-02-09 DIAGNOSIS — Z12.31 SCREENING MAMMOGRAM FOR HIGH-RISK PATIENT: ICD-10-CM

## 2022-02-09 PROCEDURE — 1159F MED LIST DOCD IN RCRD: CPT | Mod: CPTII,S$GLB,, | Performed by: OBSTETRICS & GYNECOLOGY

## 2022-02-09 PROCEDURE — 1159F PR MEDICATION LIST DOCUMENTED IN MEDICAL RECORD: ICD-10-PCS | Mod: CPTII,S$GLB,, | Performed by: OBSTETRICS & GYNECOLOGY

## 2022-02-09 PROCEDURE — 3079F PR MOST RECENT DIASTOLIC BLOOD PRESSURE 80-89 MM HG: ICD-10-PCS | Mod: CPTII,S$GLB,, | Performed by: OBSTETRICS & GYNECOLOGY

## 2022-02-09 PROCEDURE — 3008F PR BODY MASS INDEX (BMI) DOCUMENTED: ICD-10-PCS | Mod: CPTII,S$GLB,, | Performed by: OBSTETRICS & GYNECOLOGY

## 2022-02-09 PROCEDURE — 99396 PR PREVENTIVE VISIT,EST,40-64: ICD-10-PCS | Mod: S$GLB,,, | Performed by: OBSTETRICS & GYNECOLOGY

## 2022-02-09 PROCEDURE — 99999 PR PBB SHADOW E&M-EST. PATIENT-LVL III: ICD-10-PCS | Mod: PBBFAC,,, | Performed by: OBSTETRICS & GYNECOLOGY

## 2022-02-09 PROCEDURE — 99396 PREV VISIT EST AGE 40-64: CPT | Mod: S$GLB,,, | Performed by: OBSTETRICS & GYNECOLOGY

## 2022-02-09 PROCEDURE — 3074F PR MOST RECENT SYSTOLIC BLOOD PRESSURE < 130 MM HG: ICD-10-PCS | Mod: CPTII,S$GLB,, | Performed by: OBSTETRICS & GYNECOLOGY

## 2022-02-09 PROCEDURE — 3008F BODY MASS INDEX DOCD: CPT | Mod: CPTII,S$GLB,, | Performed by: OBSTETRICS & GYNECOLOGY

## 2022-02-09 PROCEDURE — 3079F DIAST BP 80-89 MM HG: CPT | Mod: CPTII,S$GLB,, | Performed by: OBSTETRICS & GYNECOLOGY

## 2022-02-09 PROCEDURE — 3074F SYST BP LT 130 MM HG: CPT | Mod: CPTII,S$GLB,, | Performed by: OBSTETRICS & GYNECOLOGY

## 2022-02-09 PROCEDURE — 99999 PR PBB SHADOW E&M-EST. PATIENT-LVL III: CPT | Mod: PBBFAC,,, | Performed by: OBSTETRICS & GYNECOLOGY

## 2022-02-09 RX ORDER — RIMEGEPANT SULFATE 75 MG/75MG
TABLET, ORALLY DISINTEGRATING ORAL
COMMUNITY
Start: 2022-01-20 | End: 2022-05-16 | Stop reason: SDUPTHER

## 2022-02-09 NOTE — PROGRESS NOTES
"HISTORY OF PRESENT ILLNESS:    Silke Fulton is a 41 y.o. female, , No LMP recorded.,  presents for a routine exam and has no complaints.  CO TEST PLANNED  AND MAMMOGRAM ORDER PLACED.  NO GYN COMPLAINTS.  DISCUSSED MAO GRAS AND NOT RIDING IN [place] THIS YEAR    :  COTEST, REF MAMMO.  HAS NO GYN ISSUES.  DISCUSSED SOME COMFORT MEASURES FOR RIB PAIN AND ALSO FOR A DERMAL CYST ON THE BACK OF HER NECK (APPEARS FOLLICULITIS, LESS THAN 1 CM) TO PROMOTE SPONTANEOUS DRAINAGE.  VISITED SISTER IN [place] ALMOST A YEAR AGO FOLLOWING DELIVERY OF HER BABY AND  DROVE HER HERE FOR VISIT THIS MORNING  2019:   PAP DUE END  AND REF MAMMO.  DECLINES CONTRACEPT.  SOME ITCHING AFTER WAXING 3 DAYS AGO - NO INFECTION OR LESION NOTED - OK CORTAID FOR PROB TOPICAL SKIN IRRITATION  DISCUSSED HERBAL SUPPLEMENTS AND GETTING NAILS DONE WITH HER PREGNANT SISTER . .   LAST VISIT 2018:  PAP DUE , REF MAMMO BY HER PCP, WILL SCHEDULE.  FLU VACCINE REF.  SOME NILESH SX  We reviewed the role of bladder training,  timed voiding, Kegel exercises, and the avoidance of bladder irritants in managing mild symptoms conservatively.  NO GYN C/O  LAST VISIT 2017:  PAP DUE AND SUBMITTED.  HAS SOME RLQ PAIN, INTERMITTENT OVER THE PAST FEW MONTHS, NOT ASSOCIATED WITH CYCLE.   POSSIBLY ASSOCIATED GI SYMPTOMS/ PROBLEMS.  REASSURED NORMAL EXAM.  DECLINES CONTRACEPTION  LAST VISIT 2016:   PAP DUE .  SISTER WITH LOSS - FAMILY WITH ?TRISOMY 13 TRANSLOCATION - (PT SAYS THAT 13 AND 14TH CHROMOSOMES CONNECTED) - WILL CHECK PT'S CHROMOSOMES NOW.  NOT CONTRACEPTING BUT NOT ACTIVELY TRYING.    LAST VISIT 2015:  LAST PAP , DISCUSSED SCREENING RECOMMENDATIONS. PREFERS PAP THIS YEAR AND THEN WILL FOLLOW RECS. STOPPED OCP LAST YEAR (HAD SOME PEDAL EDEMA LAST YEAR). DECLINES PROGESTERONE-ONLY CONTRACEPTION AND "WAIT AND SEE." - ADVISED PNV. IS CONSIDERING ACTIVELY PURSUING PREGNANCY AND WILL REF TO  FIRST FOR EVALUATION.  LLQ " PAIN X 1 WEEK IN July, PRECEEDED MENSES.    Past Medical History:   Diagnosis Date    Acute gastritis without mention of hemorrhage     Esophageal reflux     Meningitis     staph aureus    Migraine without aura, without mention of intractable migraine without mention of status migrainosus     Personal history of malignant neoplasm of brain     cerebellar astrocytoma    Tarsal tunnel syndrome        Past Surgical History:   Procedure Laterality Date    cerebellar astrocytoma      eye-superior oblique palsy      right mastoidectomy         MEDICATIONS AND ALLERGIES:      Current Outpatient Medications:     cetirizine (ZYRTEC) 10 MG tablet, Take 10 mg by mouth once daily., Disp: , Rfl:     EC-NAPROXEN 500 mg EC tablet, TAKE 1 TABLET (500 MG TOTAL) BY MOUTH 2 (TWO) TIMES DAILY AS NEEDED (HEADACHE)., Disp: 20 tablet, Rfl: 6    gabapentin (NEURONTIN) 300 MG capsule, Take 2 capsules (600 mg total) by mouth every evening., Disp: 180 capsule, Rfl: 3    ketorolac (TORADOL) 10 mg tablet, TAKE 1 TAB BY MOUTH EVERY 8 HOURS AS NEEDED FOR HEADACHE, NOT TO BE USED MORE THAN 3X IN WEEK, Disp: 20 tablet, Rfl: 6    magnesium 250 mg Tab, Take 500 mg by mouth once daily. , Disp: , Rfl:     multivitamin (THERAGRAN) per tablet, Take 1 tablet by mouth once daily., Disp: , Rfl:     NURTEC 75 mg odt, Place under the tongue., Disp: , Rfl:     polycarbophil (FIBERCON) 625 mg tablet, Take 625 mg by mouth once daily., Disp: , Rfl:     propranoloL (INDERAL) 40 MG tablet, Take 1 tablet (40 mg total) by mouth 2 (two) times daily., Disp: 180 tablet, Rfl: 3    sumatriptan (IMITREX) 100 MG tablet, TAKE 1 TABLET (100 MG TOTAL) BY MOUTH EVERY 2 (TWO) HOURS AS NEEDED FOR MIGRAINE., Disp: 9 tablet, Rfl: 6    DENTA 5000 PLUS 1.1 % Crea, BRUSH TEETH FOR 2 MINUTES AT BEDTIME. EXPECTORATE., Disp: , Rfl: 2    Review of patient's allergies indicates:   Allergen Reactions    Septra [sulfamethoxazole-trimethoprim] Other (See Comments)     Sulfa (sulfonamide antibiotics)      Bone marrow suppression         Family History   Problem Relation Age of Onset    Breast cancer Maternal Aunt     Migraines Mother     Arrhythmia Mother     Asthma Mother     Asthma Brother     Heart failure Maternal Grandmother     Hyperlipidemia Maternal Grandmother     Hypertension Maternal Grandmother     Osteoarthritis Maternal Grandmother     Alzheimer's disease Maternal Grandmother     Hypertension Maternal Grandfather     Osteoarthritis Maternal Grandfather     Alzheimer's disease Maternal Grandfather     Colon cancer Neg Hx     Melanoma Neg Hx        Social History     Socioeconomic History    Marital status:     Number of children: 0    Years of education: 20   Occupational History    Occupation: Call Center Counsellor     Employer: [employer]   Tobacco Use    Smoking status: Never Smoker    Smokeless tobacco: Never Used   Substance and Sexual Activity    Alcohol use: Yes     Alcohol/week: 2.0 standard drinks     Types: 2 Standard drinks or equivalent per week     Comment: socially    Drug use: No    Sexual activity: Yes     Partners: Male     Birth control/protection: None   Social History Narrative           COMPREHENSIVE GYN HISTORY:  PAP History: Denies abnormal Paps.  Infection History: Denies STDs. Denies PID.  Benign History: Denies uterine fibroids. Denies ovarian cysts. Denies endometriosis. Denies other conditions.  Cancer History: Denies cervical cancer. Denies uterine cancer or hyperplasia. Denies ovarian cancer. Denies vulvar cancer or pre-cancer. Denies vaginal cancer or pre-cancer. Denies breast cancer. Denies colon cancer.  Menstrual History: Monthly, mild-moderate.  Contraception:natural family planning (NFP)    ROS:  GENERAL: No weight changes. No swelling. No fatigue. No fever.  CARDIOVASCULAR: No chest pain. No shortness of breath. No leg cramps.   NEUROLOGICAL: No headaches. No vision changes.  BREASTS: No pain.  No lumps. No discharge.  ABDOMEN: No pain. No nausea. No vomiting. No diarrhea. No constipation.  REPRODUCTIVE: No abnormal bleeding.   VULVA: No pain. No lesions. No itching.  VAGINA: No relaxation. No itching. No odor. No discharge. No lesions.  URINARY: No incontinence. No nocturia. No frequency. No dysuria.    /84   Wt 90.1 kg (198 lb 8.4 oz)   BMI 36.31 kg/m²     PE:  APPEARANCE: Well nourished, well developed, in no acute distress.  AFFECT: WNL, alert and oriented x 3.  SKIN: No acne or hirsutism.  NECK: Neck symmetric, without masses or thyromegaly.  NODES: No inguinal, cervical, axillary or femoral lymph node enlargement.  CHEST: Good respiratory effort.   ABDOMEN: Soft. No tenderness or masses. No hepatosplenomegaly. No hernias.  BREASTS: Symmetrical, no skin changes, visible lesions, palpable masses or nipple discharge bilaterally.  PELVIC: External female genitalia without lesions.  Female hair distribution. Adequate perineal body, Normal urethral meatus. Vagina moist and well rugated without lesions or discharge.  No significant cystocele or rectocele present. Cervix pink without lesions, discharge or tenderness. Uterus is normal size, regular, mobile and nontender. Adnexa without masses or tenderness.  EXTREMITIES: No edema    PROCEDURES:    DIAGNOSIS:  1. Visit for gynecologic examination     2. Screening mammogram for high-risk patient  Mammo Digital Screening Bilat w/ Zane       LABS AND TESTS ORDERED:    MEDICATIONS PRESCRIBED:    COUNSELING:   The patient was counseled today on ACS PAP guidelines, with recommendations for yearly pelvic exams unless their uterus, cervix, and ovaries were removed for benign reasons; in that case, examinations every 3-5 years are recommended.  The patient was also counseled regarding monthly breast self-examination, routine STD screening for at-risk populations, prophylactic immunizations for transmitted infections such as  HPV, Pertussis, or Influenza as  appropriate, and yearly mammograms when indicated by ACS guidelines.  She was advised to see her primary care physician for all other health maintenance.    FOLLOW-UP with me annually.

## 2022-03-14 ENCOUNTER — TELEPHONE (OUTPATIENT)
Dept: NEUROLOGY | Facility: CLINIC | Age: 42
End: 2022-03-14
Payer: COMMERCIAL

## 2022-03-30 ENCOUNTER — TELEPHONE (OUTPATIENT)
Dept: INTERNAL MEDICINE | Facility: CLINIC | Age: 42
End: 2022-03-30
Payer: COMMERCIAL

## 2022-03-30 NOTE — TELEPHONE ENCOUNTER
----- Message -----       From:Silke Fulton       Sent:3/30/2022 12:11 PM CDT         To:Nisreen Avelar MD    Subject:Appointment Request    Oh, I dont hear wheezing when I breath so not in my lungs, just feels like someone punched me in the chest, I dont think I cough as frequently as before      ----- Message -----       From:Silke Fulton       Sent:3/30/2022 12:09 PM CDT         To:Nisreen Avelar MD    Subject:Appointment Request    I got cut off   I did have neon green  diherria last night, also had cereal, milk, banana, no lunch, baked fish with cheese, aspergaus but my test was Negative so I know its related to the fish I made & didnt like       ----- Message -----       From:Silke Fulton       Sent:3/30/2022 12:07 PM CDT         To:Nisreen Avelar MD    Subject:Appointment Request    Karley Kidd, at the end of Feb the weather changed & I got very stuffy & took Mucinex D for a week or 2 & was better. My mucus was thick yellow so sinuses, Last week I got a cough & then got congested, mom said I should never stop the Mucinex D in March, My mucus was thick yellow but then Sunday it was thick white/clear so we went to her house for a home COVID test we got from [facility], not written in Chinese so seems more legit & was Negstive for me & Bryan. Miguel says something is wrong because I got really mad at him for not answering the door on Sunday night & was mean last night. Im taking the Mucinex D, my mucus is back to thick yellow,       ----- Message -----       From:Nurse Kidd       Sent:3/30/2022 11:46 AM CDT         To:Silke Fulton    Subject:Appointment Request    Hello.  Have you been tested for Covid since you began having these symptoms?    Unfortunately, I do not have anything available with Dr. Gifford for the time frame requested.      ----- Message -----       From:Silke Fulton       Sent:3/29/2022  5:54 PM CDT          To:Nisreen Avelar MD    Subject:Appointment Request    Appointment Request From: Silke Fulton    With Provider: Nisreen Avelar MD [[facility]]    Preferred Date Range: 3/30/2022 - 4/1/2022    Preferred Times: Any Time    Reason for visit: Office Visit    Comments:  This message is being sent by Corey Fulton on behalf of Silke Fulton.  Head cold with cough.  Really exhausted.

## 2022-03-31 ENCOUNTER — PATIENT MESSAGE (OUTPATIENT)
Dept: INTERNAL MEDICINE | Facility: CLINIC | Age: 42
End: 2022-03-31
Payer: COMMERCIAL

## 2022-04-01 ENCOUNTER — TELEPHONE (OUTPATIENT)
Dept: NEUROLOGY | Facility: CLINIC | Age: 42
End: 2022-04-01
Payer: COMMERCIAL

## 2022-04-01 NOTE — TELEPHONE ENCOUNTER
----- Message from Bre Perezmfield sent at 4/1/2022  2:52 PM CDT -----  Name of Caller: Alcides    Pharmacy Name: [facility]    Prescription Name: rimegepant (NURTEC) 75 mg odt    What do they need to clarify?: Calling to follow up on the prior authorization for the medication. Please advise.     Best Call Back Number: [phone]; ext [phone]    Additional Information:

## 2022-04-04 ENCOUNTER — TELEPHONE (OUTPATIENT)
Dept: INTERNAL MEDICINE | Facility: CLINIC | Age: 42
End: 2022-04-04
Payer: COMMERCIAL

## 2022-04-04 NOTE — TELEPHONE ENCOUNTER
----- Message -----       From:Silke Fulton       Sent:4/2/2022 10:02 PM CDT         To:Nisreen Avelar MD    Subject:Appointment Request    Appointment Request From: Silke Fulton    With Provider: Nisreen Avelar MD [[facility]]    Preferred Date Range: 4/6/2022 - 4/6/2022    Preferred Times: Any Time    Reason for visit: Office Visit    Comments:  Im trying to see all these options, it wont let me see your resident

## 2022-04-05 ENCOUNTER — OFFICE VISIT (OUTPATIENT)
Dept: INTERNAL MEDICINE | Facility: CLINIC | Age: 42
End: 2022-04-05
Payer: COMMERCIAL

## 2022-04-05 VITALS
TEMPERATURE: 98 F | SYSTOLIC BLOOD PRESSURE: 132 MMHG | HEART RATE: 80 BPM | RESPIRATION RATE: 16 BRPM | WEIGHT: 199.75 LBS | OXYGEN SATURATION: 98 % | DIASTOLIC BLOOD PRESSURE: 68 MMHG | HEIGHT: 62 IN | BODY MASS INDEX: 36.76 KG/M2

## 2022-04-05 DIAGNOSIS — J40 BRONCHITIS: Primary | ICD-10-CM

## 2022-04-05 PROCEDURE — 99213 OFFICE O/P EST LOW 20 MIN: CPT | Mod: S$GLB,,,

## 2022-04-05 PROCEDURE — 99999 PR PBB SHADOW E&M-EST. PATIENT-LVL IV: ICD-10-PCS | Mod: PBBFAC,,,

## 2022-04-05 PROCEDURE — 99999 PR PBB SHADOW E&M-EST. PATIENT-LVL IV: CPT | Mod: PBBFAC,,,

## 2022-04-05 PROCEDURE — 99213 PR OFFICE/OUTPT VISIT, EST, LEVL III, 20-29 MIN: ICD-10-PCS | Mod: S$GLB,,,

## 2022-04-05 RX ORDER — AZITHROMYCIN 250 MG/1
TABLET, FILM COATED ORAL
Qty: 6 TABLET | Refills: 0 | Status: SHIPPED | OUTPATIENT
Start: 2022-04-05 | End: 2022-04-10

## 2022-04-05 RX ORDER — BENZONATATE 100 MG/1
100 CAPSULE ORAL 3 TIMES DAILY PRN
Qty: 30 CAPSULE | Refills: 0 | Status: SHIPPED | OUTPATIENT
Start: 2022-04-05 | End: 2022-04-15

## 2022-04-05 NOTE — PROGRESS NOTES
Clinic Note  4/5/2022      Subjective:       Patient ID:  patient is a 41 y.o. female being seen for an urgent care visit    Chief Complaint: Nasal Congestion, Headache, Chest Pain, and URI (Pt says she has a cold with chest and nasal congestion for 3 wks/)    HPI  Ms. Fultno is a 41 year old female with a history of chronic migraines and astrocytoma s/p excision in childhood who presents today for cough. Patient states that she has had a cough for approximately 3 weeks. The cough is productive. Symptoms are associated w/ congestion and pleuritic chest pain. Patient states she took Mucinex and Zyrtec w/ no relief of symptoms. She does feel as though her symptoms are improving but her family insisted she come to the doctors office. Patient denies vision changes, SOB, CP, abdominal pain, post nasal drip, fevers, chills, diarrhea, vomiting or sick contacts. Home COVID-19 test (-).       Review of Systems   Constitutional: Negative for chills and fever.   HENT: Negative for congestion and sinus pain.    Eyes: Negative for blurred vision and pain.   Respiratory: Positive for cough and sputum production. Negative for shortness of breath.    Cardiovascular: Negative for chest pain and palpitations.   Gastrointestinal: Negative for abdominal pain, diarrhea and vomiting.   Genitourinary: Negative for dysuria and urgency.   Musculoskeletal: Negative for joint pain and myalgias.   Skin: Negative for itching and rash.   Neurological: Negative for dizziness and headaches.   Psychiatric/Behavioral: Negative for depression and suicidal ideas.       Medication List with Changes/Refills   New Medications    AZITHROMYCIN (Z-CHAPARRO) 250 MG TABLET    Take 2 tablets by mouth on day 1; Take 1 tablet by mouth on days 2-5    BENZONATATE (TESSALON) 100 MG CAPSULE    Take 1 capsule (100 mg total) by mouth 3 (three) times daily as needed for Cough.   Current Medications    CETIRIZINE (ZYRTEC) 10 MG TABLET    Take 10 mg by mouth once daily.     "DENTA 5000 PLUS 1.1 % CREA    BRUSH TEETH FOR 2 MINUTES AT BEDTIME. EXPECTORATE.    EC-NAPROXEN 500 MG EC TABLET    TAKE 1 TABLET (500 MG TOTAL) BY MOUTH 2 (TWO) TIMES DAILY AS NEEDED (HEADACHE).    GABAPENTIN (NEURONTIN) 300 MG CAPSULE    TAKE 2 CAPSULES (600 MG TOTAL) BY MOUTH EVERY EVENING.    KETOROLAC (TORADOL) 10 MG TABLET    TAKE 1 TAB BY MOUTH EVERY 8 HOURS AS NEEDED FOR HEADACHE, NOT TO BE USED MORE THAN 3X IN WEEK    MAGNESIUM 250 MG TAB    Take 500 mg by mouth once daily.     MULTIVITAMIN (THERAGRAN) PER TABLET    Take 1 tablet by mouth once daily.    NURTEC 75 MG ODT    Place under the tongue.    POLYCARBOPHIL (FIBERCON) 625 MG TABLET    Take 625 mg by mouth once daily.    PROPRANOLOL (INDERAL) 40 MG TABLET    TAKE 1 TABLET BY MOUTH TWICE A DAY    SUMATRIPTAN (IMITREX) 100 MG TABLET    TAKE 1 TABLET (100 MG TOTAL) BY MOUTH EVERY 2 (TWO) HOURS AS NEEDED FOR MIGRAINE.       Patient Active Problem List   Diagnosis    Chronic migraine without aura, with intractable migraine, so stated, with status migrainosus    Occipital neuralgia    Dizzinesses    Personal history of malignant neoplasm of brain    Leg swelling    Cervicogenic headache             Health Maintenance Topics with due status: Not Due       Topic Last Completion Date    TETANUS VACCINE 09/27/2018    Cervical Cancer Screening 02/03/2021       Objective:      /68 (BP Location: Right arm, Patient Position: Sitting, BP Method: Large (Manual))   Pulse 80   Temp 97.6 °F (36.4 °C) (Temporal)   Resp 16   Ht 5' 2" (1.575 m)   Wt 90.6 kg (199 lb 11.8 oz)   LMP 03/06/2022 (Approximate)   SpO2 98%   BMI 36.53 kg/m²   Estimated body mass index is 36.53 kg/m² as calculated from the following:    Height as of this encounter: 5' 2" (1.575 m).    Weight as of this encounter: 90.6 kg (199 lb 11.8 oz).  Physical Exam  Constitutional:       Appearance: Normal appearance.   HENT:      Head: Normocephalic and atraumatic.      Right Ear: Tympanic " membrane, ear canal and external ear normal.      Left Ear: Tympanic membrane, ear canal and external ear normal.      Mouth/Throat:      Mouth: Mucous membranes are moist.      Pharynx: Oropharynx is clear. No oropharyngeal exudate or posterior oropharyngeal erythema.   Eyes:      General: No scleral icterus.     Extraocular Movements: Extraocular movements intact.      Conjunctiva/sclera: Conjunctivae normal.   Cardiovascular:      Rate and Rhythm: Normal rate and regular rhythm.      Pulses: Normal pulses.      Heart sounds: Normal heart sounds.   Pulmonary:      Effort: Pulmonary effort is normal.      Breath sounds: Normal breath sounds.   Abdominal:      General: Bowel sounds are normal.      Palpations: Abdomen is soft.      Tenderness: There is no abdominal tenderness.   Musculoskeletal:         General: No swelling or tenderness. Normal range of motion.   Skin:     General: Skin is warm and dry.      Capillary Refill: Capillary refill takes less than 2 seconds.   Neurological:      General: No focal deficit present.      Mental Status: She is alert.   Psychiatric:         Mood and Affect: Mood normal.         Behavior: Behavior normal.           Assessment and Plan:     Bronchitis  - Z candida prescribed given patient has had persistent symptoms for 3 weeks   - tessalon perles for cough suppression   - advised patient to RTC if symptoms worsen       Follow Up:   No follow-ups on file.        Jillian Petit M.D.  Internal Medicine PGY-1  [facility]

## 2022-04-05 NOTE — PROGRESS NOTES
I have interviewed and examined the patient w/ the resident,   I agree w/ the impression and plan as outlined above.     Alejandra Gifford MD- Staff

## 2022-04-06 ENCOUNTER — HOSPITAL ENCOUNTER (OUTPATIENT)
Dept: RADIOLOGY | Facility: HOSPITAL | Age: 42
Discharge: HOME | End: 2022-04-06
Attending: OBSTETRICS & GYNECOLOGY
Payer: COMMERCIAL

## 2022-04-06 DIAGNOSIS — Z12.31 SCREENING MAMMOGRAM FOR HIGH-RISK PATIENT: ICD-10-CM

## 2022-04-06 PROCEDURE — 77067 MAMMO DIGITAL SCREENING BILAT WITH TOMO: ICD-10-PCS | Mod: 26,,, | Performed by: INTERNAL MEDICINE

## 2022-04-06 PROCEDURE — 77063 MAMMO DIGITAL SCREENING BILAT WITH TOMO: ICD-10-PCS | Mod: 26,,, | Performed by: INTERNAL MEDICINE

## 2022-04-06 PROCEDURE — 77063 BREAST TOMOSYNTHESIS BI: CPT | Mod: TC

## 2022-04-06 PROCEDURE — 77063 BREAST TOMOSYNTHESIS BI: CPT | Mod: 26,,, | Performed by: INTERNAL MEDICINE

## 2022-04-06 PROCEDURE — 77067 SCR MAMMO BI INCL CAD: CPT | Mod: TC

## 2022-04-06 PROCEDURE — 77067 SCR MAMMO BI INCL CAD: CPT | Mod: 26,,, | Performed by: INTERNAL MEDICINE

## 2022-04-26 ENCOUNTER — TELEPHONE (OUTPATIENT)
Dept: OBSTETRICS AND GYNECOLOGY | Facility: CLINIC | Age: 42
End: 2022-04-26
Payer: COMMERCIAL

## 2022-04-27 ENCOUNTER — LAB VISIT (OUTPATIENT)
Dept: LAB | Facility: HOSPITAL | Age: 42
End: 2022-04-27
Attending: PHYSICIAN ASSISTANT
Payer: COMMERCIAL

## 2022-04-27 ENCOUNTER — OFFICE VISIT (OUTPATIENT)
Dept: OBSTETRICS AND GYNECOLOGY | Facility: CLINIC | Age: 42
End: 2022-04-27
Payer: COMMERCIAL

## 2022-04-27 VITALS — SYSTOLIC BLOOD PRESSURE: 118 MMHG | BODY MASS INDEX: 37.1 KG/M2 | DIASTOLIC BLOOD PRESSURE: 72 MMHG | WEIGHT: 202.81 LBS

## 2022-04-27 DIAGNOSIS — N91.5 OLIGOMENORRHEA, UNSPECIFIED TYPE: Primary | ICD-10-CM

## 2022-04-27 DIAGNOSIS — N91.5 OLIGOMENORRHEA, UNSPECIFIED TYPE: ICD-10-CM

## 2022-04-27 LAB
HCG INTACT+B SERPL-ACNC: <1.2 MIU/ML
TSH SERPL DL<=0.005 MIU/L-ACNC: 3.15 UIU/ML (ref 0.4–4)

## 2022-04-27 PROCEDURE — 3008F BODY MASS INDEX DOCD: CPT | Mod: CPTII,S$GLB,, | Performed by: PHYSICIAN ASSISTANT

## 2022-04-27 PROCEDURE — 84436 ASSAY OF TOTAL THYROXINE: CPT | Performed by: PHYSICIAN ASSISTANT

## 2022-04-27 PROCEDURE — 1159F MED LIST DOCD IN RCRD: CPT | Mod: CPTII,S$GLB,, | Performed by: PHYSICIAN ASSISTANT

## 2022-04-27 PROCEDURE — 84443 ASSAY THYROID STIM HORMONE: CPT | Performed by: PHYSICIAN ASSISTANT

## 2022-04-27 PROCEDURE — 3078F DIAST BP <80 MM HG: CPT | Mod: CPTII,S$GLB,, | Performed by: PHYSICIAN ASSISTANT

## 2022-04-27 PROCEDURE — 3074F SYST BP LT 130 MM HG: CPT | Mod: CPTII,S$GLB,, | Performed by: PHYSICIAN ASSISTANT

## 2022-04-27 PROCEDURE — 84146 ASSAY OF PROLACTIN: CPT | Performed by: PHYSICIAN ASSISTANT

## 2022-04-27 PROCEDURE — 3074F PR MOST RECENT SYSTOLIC BLOOD PRESSURE < 130 MM HG: ICD-10-PCS | Mod: CPTII,S$GLB,, | Performed by: PHYSICIAN ASSISTANT

## 2022-04-27 PROCEDURE — 99213 PR OFFICE/OUTPT VISIT, EST, LEVL III, 20-29 MIN: ICD-10-PCS | Mod: S$GLB,,, | Performed by: PHYSICIAN ASSISTANT

## 2022-04-27 PROCEDURE — 99999 PR PBB SHADOW E&M-EST. PATIENT-LVL III: ICD-10-PCS | Mod: PBBFAC,,, | Performed by: PHYSICIAN ASSISTANT

## 2022-04-27 PROCEDURE — 99213 OFFICE O/P EST LOW 20 MIN: CPT | Mod: S$GLB,,, | Performed by: PHYSICIAN ASSISTANT

## 2022-04-27 PROCEDURE — 1159F PR MEDICATION LIST DOCUMENTED IN MEDICAL RECORD: ICD-10-PCS | Mod: CPTII,S$GLB,, | Performed by: PHYSICIAN ASSISTANT

## 2022-04-27 PROCEDURE — 84702 CHORIONIC GONADOTROPIN TEST: CPT | Performed by: PHYSICIAN ASSISTANT

## 2022-04-27 PROCEDURE — 3078F PR MOST RECENT DIASTOLIC BLOOD PRESSURE < 80 MM HG: ICD-10-PCS | Mod: CPTII,S$GLB,, | Performed by: PHYSICIAN ASSISTANT

## 2022-04-27 PROCEDURE — 99999 PR PBB SHADOW E&M-EST. PATIENT-LVL III: CPT | Mod: PBBFAC,,, | Performed by: PHYSICIAN ASSISTANT

## 2022-04-27 PROCEDURE — 3008F PR BODY MASS INDEX (BMI) DOCUMENTED: ICD-10-PCS | Mod: CPTII,S$GLB,, | Performed by: PHYSICIAN ASSISTANT

## 2022-04-27 PROCEDURE — 36415 COLL VENOUS BLD VENIPUNCTURE: CPT | Performed by: PHYSICIAN ASSISTANT

## 2022-04-27 NOTE — PROGRESS NOTES
Subjective:     CC:   Chief Complaint   Patient presents with    cycle 25 days late        Silke Fulton is a 41 y.o. female who presents for evaluation of oligomenorrhea. Patient's last menstrual period was 03/06/2022 (exact date). She is 25 days late. Periods were regular in the past occurring every 1 month. Patient has no relevant history of abnormal sexual development.   Is there a chance of pregnancy? yes. upt lab test done? yes, negative.   Factors that may be contributory to menstrual abnormalities include: none.   Denies new daily medications, stressors, weight changes. Reports bronchitis and use of z pac a few weeks ago. Reports some abdominal pain but states that its normal for her.    Last 2/22 wwe ek CO TEST PLANNED 2024 AND MAMMOGRAM ORDER PLACED.  NO GYN COMPLAINTS.  DISCUSSED MAO GRAS AND NOT RIDING IN [place] THIS YEAR  2021: COTEST, REF MAMMO.  HAS NO GYN ISSUES.  DISCUSSED SOME COMFORT MEASURES FOR RIB PAIN AND ALSO FOR A DERMAL CYST ON THE BACK OF HER NECK (APPEARS FOLLICULITIS, LESS THAN 1 CM) TO PROMOTE SPONTANEOUS DRAINAGE. VISITED SISTER IN [place] ALMOST A YEAR AGO FOLLOWING DELIVERY OF HER BABY AND  DROVE HER HERE FOR VISIT THIS MORNING  2019:  PAP DUE END 2020 AND REF MAMMO.  DECLINES CONTRACEPT.  SOME ITCHING AFTER WAXING 3 DAYS AGO - NO INFECTION OR LESION NOTED - OK CORTAID FOR PROB TOPICAL SKIN IRRITATION  DISCUSSED HERBAL SUPPLEMENTS AND GETTING NAILS DONE WITH HER PREGNANT SISTER . .   LAST VISIT 2018: PAP DUE 2020, REF MAMMO BY HER PCP, WILL SCHEDULE.  FLU VACCINE REF.  SOME NILESH SX  We reviewed the role of bladder training,  timed voiding, Kegel exercises, and the avoidance of bladder irritants in managing mild symptoms conservatively.  NO GYN C/O  LAST VISIT 2017: PAP DUE AND SUBMITTED.  HAS SOME RLQ PAIN, INTERMITTENT OVER THE PAST FEW MONTHS, NOT ASSOCIATED WITH CYCLE.   POSSIBLY ASSOCIATED GI SYMPTOMS/ PROBLEMS.  REASSURED NORMAL EXAM.  DECLINES  "CONTRACEPTION  LAST VISIT 2016:  PAP DUE 2017.  SISTER WITH LOSS - FAMILY WITH ?TRISOMY 13 TRANSLOCATION - (PT SAYS THAT 13 AND 14TH CHROMOSOMES CONNECTED) - WILL CHECK PT'S CHROMOSOMES NOW.  NOT CONTRACEPTING BUT NOT ACTIVELY TRYING.    LAST VISIT 2015: LAST PAP , DISCUSSED SCREENING RECOMMENDATIONS. PREFERS PAP THIS YEAR AND THEN WILL FOLLOW RECS. STOPPED OCP LAST YEAR (HAD SOME PEDAL EDEMA LAST YEAR). DECLINES PROGESTERONE-ONLY CONTRACEPTION AND "WAIT AND SEE." - ADVISED PNV. IS CONSIDERING ACTIVELY PURSUING PREGNANCY AND WILL REF TO  FIRST FOR EVALUATION.  LLQ PAIN X 1 WEEK IN July, PRECEEDED MENSES.    Menstrual History:  OB History        0    Para        Term   0            AB        Living           SAB        IAB        Ectopic        Multiple        Live Births                      Patient's last menstrual period was 2022 (exact date).       The following portions of the patient's history were reviewed and updated as appropriate: allergies, current medications, past family history, past medical history, past social history, past surgical history and problem list.    Review of Systems  Constitutional: negative for fatigue, fevers, night sweats and weight loss  Respiratory: negative for cough and dyspnea on exertion  Cardiovascular: negative for chest pain, palpitations and lower extremity edema  Gastrointestinal: negative for abdominal pain, change in bowel habits, constipation, diarrhea, nausea and vomiting  Genitourinary:negative for genital lesions, hot flashes, sexual problems and vaginal discharge  Integument/breast: negative for breast lump, breast tenderness, nipple discharge and skin color change  Hematologic/lymphatic: negative for easy bruising and lymphadenopathy  Neurological: negative for dizziness and headaches  Endocrine: negative for temperature intolerance    Objective:      /72   Wt 92 kg (202 lb 13.2 oz)   LMP 2022 (Exact Date)   BMI 37.10 " kg/m²   General:   alert, appears stated age and cooperative   Neuro:  Alert, oriented x4, normal speech.   Skin:   no rash or abnormalities, no acne. No hirsutism.    Lungs:   Unlabored, even breaths.  Clear to auscultation bilaterally   Heart:   Regular rate and rhythm   Breasts:   deferred   Abdomen:  soft, non-tender; bowel sounds normal; no masses, no organomegaly   Pelvis:  Exam deferred.       Assessment:      The patient has oligomenorrhea.      Plan:        Discussed anovulatory cycle likely the cause. PRL and TSH ordered to r/o other etiologies. Will try provera in 3-4 weeks if cycle has not started naturally. Possible US at that time.   Cris Holt PA-C

## 2022-04-28 ENCOUNTER — OFFICE VISIT (OUTPATIENT)
Dept: OPTOMETRY | Facility: CLINIC | Age: 42
End: 2022-04-28
Payer: COMMERCIAL

## 2022-04-28 ENCOUNTER — PATIENT MESSAGE (OUTPATIENT)
Dept: OPTOMETRY | Facility: CLINIC | Age: 42
End: 2022-04-28
Payer: COMMERCIAL

## 2022-04-28 DIAGNOSIS — H16.229 KERATOCONJUNCT SICCA, NOT SPECIFIED AS SJOGREN'S, UNSP EYE: ICD-10-CM

## 2022-04-28 DIAGNOSIS — H49.12 FOURTH NERVE PALSY OF LEFT EYE: ICD-10-CM

## 2022-04-28 DIAGNOSIS — Z85.841 PERSONAL HISTORY OF MALIGNANT NEOPLASM OF BRAIN: ICD-10-CM

## 2022-04-28 DIAGNOSIS — H52.203 ASTIGMATISM OF BOTH EYES, UNSPECIFIED TYPE: Primary | ICD-10-CM

## 2022-04-28 DIAGNOSIS — Z98.890 HISTORY OF STRABISMUS SURGERY: ICD-10-CM

## 2022-04-28 LAB
PROLACTIN SERPL IA-MCNC: 23 NG/ML (ref 5.2–26.5)
T4 SERPL-MCNC: 7.4 UG/DL (ref 4.5–11.5)

## 2022-04-28 PROCEDURE — 92014 COMPRE OPH EXAM EST PT 1/>: CPT | Mod: S$GLB,,, | Performed by: OPTOMETRIST

## 2022-04-28 PROCEDURE — 99999 PR PBB SHADOW E&M-EST. PATIENT-LVL II: CPT | Mod: PBBFAC,,, | Performed by: OPTOMETRIST

## 2022-04-28 PROCEDURE — 92015 DETERMINE REFRACTIVE STATE: CPT | Mod: S$GLB,,, | Performed by: OPTOMETRIST

## 2022-04-28 PROCEDURE — 92015 PR REFRACTION: ICD-10-PCS | Mod: S$GLB,,, | Performed by: OPTOMETRIST

## 2022-04-28 PROCEDURE — 99999 PR PBB SHADOW E&M-EST. PATIENT-LVL II: ICD-10-PCS | Mod: PBBFAC,,, | Performed by: OPTOMETRIST

## 2022-04-28 PROCEDURE — 92014 PR EYE EXAM, EST PATIENT,COMPREHESV: ICD-10-PCS | Mod: S$GLB,,, | Performed by: OPTOMETRIST

## 2022-04-28 RX ORDER — LIFITEGRAST 50 MG/ML
1 SOLUTION/ DROPS OPHTHALMIC 2 TIMES DAILY
Qty: 180 EACH | Refills: 3 | Status: SHIPPED | OUTPATIENT
Start: 2022-04-28 | End: 2023-02-13

## 2022-04-28 RX ORDER — CYCLOSPORINE 0.5 MG/ML
1 EMULSION OPHTHALMIC 2 TIMES DAILY
Qty: 180 EACH | Refills: 3 | Status: SHIPPED | OUTPATIENT
Start: 2022-04-28 | End: 2022-04-28

## 2022-04-28 NOTE — PROGRESS NOTES
HPI     Annual Eye Exam   Blurred vision near  Pt denies flashes and floaters  Pt has headaches, eye pain, and irritation  Headache for 30 days   Pain 10/10 with it dropping to a 5/10    Last edited by CHASE Rodriguez on 4/28/2022  2:49 PM. (History)            Assessment /Plan     For exam results, see Encounter Report.    Dry eye  Keratoconjunct sicca, not specified as Sjogren's, unsp eye  Rx Xiidra BID OU    Astigmatism of both eyes, unspecified type              Rx specs, continue with vertical prism, wear specs full time,  may need BF next visit                Personal history of malignant neoplasm of brain     Fourth nerve palsy of left eye  History of strabismus surgery  Pt reports surgery when she was 11 years old with Dr. Momo Lamb internal ocular health today     RTC 1 year, sooner PRN

## 2022-05-02 ENCOUNTER — PATIENT MESSAGE (OUTPATIENT)
Dept: OBSTETRICS AND GYNECOLOGY | Facility: CLINIC | Age: 42
End: 2022-05-02
Payer: COMMERCIAL

## 2022-05-13 ENCOUNTER — PATIENT OUTREACH (OUTPATIENT)
Dept: ADMINISTRATIVE | Facility: OTHER | Age: 42
End: 2022-05-13
Payer: COMMERCIAL

## 2022-05-13 NOTE — PROGRESS NOTES
Health Maintenance Due   Topic Date Due    Hepatitis C Screening  Never done    Pneumococcal Vaccines (Age 0-64) (1 - PCV) Never done    HIV Screening  Never done    COVID-19 Vaccine (3 - Booster for Pfizer series) 09/17/2021     Updates were requested from care everywhere.  Chart was reviewed for overdue Proactive [facility] Encounters ([facility]) topics (CRS, Breast Cancer Screening, Eye exam)  Health Maintenance has been updated.  LINKS immunization registry triggered.  Immunizations were reconciled.

## 2022-05-16 ENCOUNTER — OFFICE VISIT (OUTPATIENT)
Dept: NEUROLOGY | Facility: CLINIC | Age: 42
End: 2022-05-16
Payer: COMMERCIAL

## 2022-05-16 VITALS
HEIGHT: 62 IN | SYSTOLIC BLOOD PRESSURE: 104 MMHG | BODY MASS INDEX: 36.1 KG/M2 | DIASTOLIC BLOOD PRESSURE: 70 MMHG | WEIGHT: 196.19 LBS | HEART RATE: 63 BPM

## 2022-05-16 DIAGNOSIS — G43.711 CHRONIC MIGRAINE WITHOUT AURA, WITH INTRACTABLE MIGRAINE, SO STATED, WITH STATUS MIGRAINOSUS: ICD-10-CM

## 2022-05-16 DIAGNOSIS — M54.81 BILATERAL OCCIPITAL NEURALGIA: ICD-10-CM

## 2022-05-16 PROCEDURE — 99214 OFFICE O/P EST MOD 30 MIN: CPT | Mod: S$GLB,,, | Performed by: PSYCHIATRY & NEUROLOGY

## 2022-05-16 PROCEDURE — 1160F RVW MEDS BY RX/DR IN RCRD: CPT | Mod: CPTII,S$GLB,, | Performed by: PSYCHIATRY & NEUROLOGY

## 2022-05-16 PROCEDURE — 3074F SYST BP LT 130 MM HG: CPT | Mod: CPTII,S$GLB,, | Performed by: PSYCHIATRY & NEUROLOGY

## 2022-05-16 PROCEDURE — 3008F PR BODY MASS INDEX (BMI) DOCUMENTED: ICD-10-PCS | Mod: CPTII,S$GLB,, | Performed by: PSYCHIATRY & NEUROLOGY

## 2022-05-16 PROCEDURE — 1160F PR REVIEW ALL MEDS BY PRESCRIBER/CLIN PHARMACIST DOCUMENTED: ICD-10-PCS | Mod: CPTII,S$GLB,, | Performed by: PSYCHIATRY & NEUROLOGY

## 2022-05-16 PROCEDURE — 1159F PR MEDICATION LIST DOCUMENTED IN MEDICAL RECORD: ICD-10-PCS | Mod: CPTII,S$GLB,, | Performed by: PSYCHIATRY & NEUROLOGY

## 2022-05-16 PROCEDURE — 1159F MED LIST DOCD IN RCRD: CPT | Mod: CPTII,S$GLB,, | Performed by: PSYCHIATRY & NEUROLOGY

## 2022-05-16 PROCEDURE — 99214 PR OFFICE/OUTPT VISIT, EST, LEVL IV, 30-39 MIN: ICD-10-PCS | Mod: S$GLB,,, | Performed by: PSYCHIATRY & NEUROLOGY

## 2022-05-16 PROCEDURE — 3078F DIAST BP <80 MM HG: CPT | Mod: CPTII,S$GLB,, | Performed by: PSYCHIATRY & NEUROLOGY

## 2022-05-16 PROCEDURE — 3008F BODY MASS INDEX DOCD: CPT | Mod: CPTII,S$GLB,, | Performed by: PSYCHIATRY & NEUROLOGY

## 2022-05-16 PROCEDURE — 99999 PR PBB SHADOW E&M-EST. PATIENT-LVL III: CPT | Mod: PBBFAC,,, | Performed by: PSYCHIATRY & NEUROLOGY

## 2022-05-16 PROCEDURE — 99999 PR PBB SHADOW E&M-EST. PATIENT-LVL III: ICD-10-PCS | Mod: PBBFAC,,, | Performed by: PSYCHIATRY & NEUROLOGY

## 2022-05-16 PROCEDURE — 3074F PR MOST RECENT SYSTOLIC BLOOD PRESSURE < 130 MM HG: ICD-10-PCS | Mod: CPTII,S$GLB,, | Performed by: PSYCHIATRY & NEUROLOGY

## 2022-05-16 PROCEDURE — 3078F PR MOST RECENT DIASTOLIC BLOOD PRESSURE < 80 MM HG: ICD-10-PCS | Mod: CPTII,S$GLB,, | Performed by: PSYCHIATRY & NEUROLOGY

## 2022-05-16 RX ORDER — GABAPENTIN 300 MG/1
600 CAPSULE ORAL NIGHTLY
Qty: 180 CAPSULE | Refills: 3 | Status: SHIPPED | OUTPATIENT
Start: 2022-05-16 | End: 2023-04-03 | Stop reason: SDUPTHER

## 2022-05-16 RX ORDER — RIMEGEPANT SULFATE 75 MG/75MG
75 TABLET, ORALLY DISINTEGRATING ORAL DAILY PRN
Qty: 8 TABLET | Refills: 12 | Status: SHIPPED | OUTPATIENT
Start: 2022-05-16 | End: 2023-04-03 | Stop reason: SDUPTHER

## 2022-05-16 RX ORDER — PROPRANOLOL HYDROCHLORIDE 40 MG/1
40 TABLET ORAL 2 TIMES DAILY
Qty: 180 TABLET | Refills: 3 | Status: SHIPPED | OUTPATIENT
Start: 2022-05-16 | End: 2023-04-03 | Stop reason: SDUPTHER

## 2022-05-16 NOTE — PROGRESS NOTES
Subjective:       Patient ID: Silke Fulton is a 41 y.o. female.    Reason for Consult: Follow-up      Interval History:  Silke Fulton is here for follow up. Their condition has clinically worsened.  The patient notes that she had an irregular skipped menstrual cycle in April and have 30 days out of 30 that month.  She notes, furthermore that she will take Nurtec, a combination of sumatriptan and naproxen (Treximet), and ketorolac as rescue for her headaches.  She states that she only has to 3 days of headache per month,  however we have discussed that there are 8 pills of Nurtec that she is allowed per month, 9 pills of sumatriptan and taking these two alone would be more than 17 days of headache per month.  We have discussed that if she is actually having this many days of headache per month, means that preventive agents not as beneficial as she may think they.  We have discussed other treatment options including Botox for migraine, Aimovig, Emgality, Ajovy as preventative therapies.  She notes sedation sometimes her current dose of gabapentin.  Her heart rate is 63 with a blood pressure of 104/70, so we cannot further increase her propanolol.    Objective:     Vitals:    05/16/22 0922   BP: 104/70   Pulse: 63     Patient is awake, alert and oriented to person, place and time. Moves all extremities antigravity. Cranial Nerves 2-12 without focal deficit. Gait and station normal.    Focused examination was undertaken today. Most of the visit time was spent giving guidance, counseling and discussing treatment options.      Assessment/Plan:     Problem List Items Addressed This Visit        Neuro    Chronic migraine without aura, with intractable migraine, so stated, with status migrainosus    Overview     Preventive - gabapentin QHS, propranolol BID  Rescue - Nurtec (has failed Imitrex, Maxalt, Amerge, Naproxen, Ketorolac)  Triggers - weather, humidity, stress, hormones    1-4 days a month, April  2022 30/30 days of headache           Relevant Medications    propranoloL (INDERAL) 40 MG tablet    gabapentin (NEURONTIN) 300 MG capsule    NURTEC 75 mg odt       Orthopedic    Occipital neuralgia    Relevant Medications    gabapentin (NEURONTIN) 300 MG capsule        41-year-old female presents for evaluation of multifactorial headaches.  At this time we have discussed trying to choose 1 rescue agent instead of having 3 different options, as having too many rescue options may cause medication overuse headache.  We have also discussed that she has adverse events with too much gabapentin and we cannot raise the dose of her propanolol above 40 mg b.i.d..  We have discussed that she had 30 days of 30 of headache in April.  The apparent trigger was a missed menstrual cycle.  We have discussed that she meets criteria for Botox for migraine as all of her headaches were lasting more than 4 hours at a time.  We have discussed that she also meets criteria for Aimovig or Emgality or Ajovy.  The patient will research these options and let me know if she would like to try any of these treatment options for a better prevention of her headaches.  I will see the patient back in a year otherwise.    The patient verbalizes understanding and agreement with the treatment plan. I have discussed risks, benefits and alternatives to the treatment plan. Questions were sought and answered to her stated verbal satisfaction.        Lakhwinder Luther MD    This note is dictated on M*Modal Fluency Direct word recognition program. There are word recognition mistakes that are occasionally missed on review.

## 2022-05-16 NOTE — PATIENT INSTRUCTIONS
Options for preventive therapy:    Botox for migraine - quarterly    Self injection monthly - Aimovig, Ajovy, Emgality    Please let me know if you are interested in these options

## 2022-05-18 ENCOUNTER — TELEPHONE (OUTPATIENT)
Dept: NEUROLOGY | Facility: CLINIC | Age: 42
End: 2022-05-18
Payer: COMMERCIAL

## 2022-05-18 NOTE — TELEPHONE ENCOUNTER
Silke Fulton (Key: [id number])  Rx #: [id number]  Nurtec 75MG dispersible tablets     Form  [employer] Electronic PA Form (2017 NCPDP)    CaseId:[id number];Status:Approved;Review Type:Prior Auth;Coverage Start Date:05/17/2022;Coverage End Date:05/17/2023;

## 2022-05-30 ENCOUNTER — PATIENT MESSAGE (OUTPATIENT)
Dept: INTERNAL MEDICINE | Facility: CLINIC | Age: 42
End: 2022-05-30
Payer: COMMERCIAL

## 2022-06-05 ENCOUNTER — PATIENT MESSAGE (OUTPATIENT)
Dept: INTERNAL MEDICINE | Facility: CLINIC | Age: 42
End: 2022-06-05
Payer: COMMERCIAL

## 2022-06-05 ENCOUNTER — OFFICE VISIT (OUTPATIENT)
Dept: URGENT CARE | Facility: CLINIC | Age: 42
End: 2022-06-05
Payer: COMMERCIAL

## 2022-06-05 VITALS
RESPIRATION RATE: 18 BRPM | HEART RATE: 86 BPM | WEIGHT: 196 LBS | OXYGEN SATURATION: 96 % | HEIGHT: 62 IN | TEMPERATURE: 100 F | DIASTOLIC BLOOD PRESSURE: 78 MMHG | BODY MASS INDEX: 36.07 KG/M2 | SYSTOLIC BLOOD PRESSURE: 116 MMHG

## 2022-06-05 DIAGNOSIS — S69.91XS INJURY OF RIGHT WRIST, SEQUELA: Primary | ICD-10-CM

## 2022-06-05 PROCEDURE — 1160F RVW MEDS BY RX/DR IN RCRD: CPT | Mod: CPTII,S$GLB,, | Performed by: PHYSICIAN ASSISTANT

## 2022-06-05 PROCEDURE — 3008F PR BODY MASS INDEX (BMI) DOCUMENTED: ICD-10-PCS | Mod: CPTII,S$GLB,, | Performed by: PHYSICIAN ASSISTANT

## 2022-06-05 PROCEDURE — 1160F PR REVIEW ALL MEDS BY PRESCRIBER/CLIN PHARMACIST DOCUMENTED: ICD-10-PCS | Mod: CPTII,S$GLB,, | Performed by: PHYSICIAN ASSISTANT

## 2022-06-05 PROCEDURE — 73110 XR WRIST COMPLETE 3 VIEWS RIGHT: ICD-10-PCS | Mod: FY,RT,S$GLB, | Performed by: RADIOLOGY

## 2022-06-05 PROCEDURE — 99213 PR OFFICE/OUTPT VISIT, EST, LEVL III, 20-29 MIN: ICD-10-PCS | Mod: S$GLB,,, | Performed by: PHYSICIAN ASSISTANT

## 2022-06-05 PROCEDURE — 99213 OFFICE O/P EST LOW 20 MIN: CPT | Mod: S$GLB,,, | Performed by: PHYSICIAN ASSISTANT

## 2022-06-05 PROCEDURE — 3074F SYST BP LT 130 MM HG: CPT | Mod: CPTII,S$GLB,, | Performed by: PHYSICIAN ASSISTANT

## 2022-06-05 PROCEDURE — 3008F BODY MASS INDEX DOCD: CPT | Mod: CPTII,S$GLB,, | Performed by: PHYSICIAN ASSISTANT

## 2022-06-05 PROCEDURE — 1159F PR MEDICATION LIST DOCUMENTED IN MEDICAL RECORD: ICD-10-PCS | Mod: CPTII,S$GLB,, | Performed by: PHYSICIAN ASSISTANT

## 2022-06-05 PROCEDURE — 3078F PR MOST RECENT DIASTOLIC BLOOD PRESSURE < 80 MM HG: ICD-10-PCS | Mod: CPTII,S$GLB,, | Performed by: PHYSICIAN ASSISTANT

## 2022-06-05 PROCEDURE — 3078F DIAST BP <80 MM HG: CPT | Mod: CPTII,S$GLB,, | Performed by: PHYSICIAN ASSISTANT

## 2022-06-05 PROCEDURE — 73110 X-RAY EXAM OF WRIST: CPT | Mod: FY,RT,S$GLB, | Performed by: RADIOLOGY

## 2022-06-05 PROCEDURE — 3074F PR MOST RECENT SYSTOLIC BLOOD PRESSURE < 130 MM HG: ICD-10-PCS | Mod: CPTII,S$GLB,, | Performed by: PHYSICIAN ASSISTANT

## 2022-06-05 PROCEDURE — 1159F MED LIST DOCD IN RCRD: CPT | Mod: CPTII,S$GLB,, | Performed by: PHYSICIAN ASSISTANT

## 2022-06-05 NOTE — PATIENT INSTRUCTIONS
If not allergic,take tylenol (acetominophen) for fever control, chills, or body aches every 4 hours. Do not exceed 4000 mg/ day.If not allergic, take Motrin (Ibuprofen) every 4 hours for fever, chills, pain or inflammation. Do not exceed 2400 mg/day. You can alternate taking tylenol and motrin.     You must understand that you've received an Urgent Care treatment only and that you may be released before all your medical problems are known or treated. You, the patient, will arrange for follow up care as instructed.      Follow up with your PCP or specialty clinic as instructed in the next 2-3 days if not improved or as needed. You can call [phone] to schedule an appointment with appropriate provider.      If you condition worsens, we recommend that you receive another evaluation at the emergency room immediately or contact your primary medical clinic's after hours call service to discuss your concerns.      Please return here or go to the Emergency Department for any concerns or worsening condition.

## 2022-06-05 NOTE — PROGRESS NOTES
"Subjective:       Patient ID: Silke Fulton is a 41 y.o. female.    Vitals:  height is 5' 2" (1.575 m) and weight is 88.9 kg (196 lb). Her temperature is 99.5 °F (37.5 °C). Her blood pressure is 116/78 and her pulse is 86. Her respiration is 18 and oxygen saturation is 96%.     Chief Complaint: Injury (Entered by patient) and Wrist Injury    41 yr old women went hiking fell on rocks and injured right wrist on may 13th and is still having discomfort.    Patient provider note starts here:  Patient presents with complaints of right wrist pain continued since an initial fall on 5/13. Reports that she was hiking when she slipped and caught herself with her right hand. Pain is exacerbated intermittently. Denies numbness or tingling in the extremity. She takes Aleeve when needed. Denies new falls or traumas. She has not been evaluated for this pain since the incident.     Injury  This is a new problem. The current episode started more than 1 month ago. The problem occurs constantly. The problem has been unchanged. Pertinent negatives include no abdominal pain, chest pain, fever, neck pain, numbness or vomiting. Nothing aggravates the symptoms. She has tried ice for the symptoms. The treatment provided mild relief.   Wrist Injury   Pertinent negatives include no chest pain or numbness.       Constitution: Negative for fever.   Neck: Negative for neck pain.   Cardiovascular: Negative for chest pain, palpitations and sob on exertion.   Respiratory: Negative for chest tightness and wheezing.    Gastrointestinal: Negative for abdominal pain, vomiting and diarrhea.   Musculoskeletal: Positive for pain and trauma.   Skin: Negative for color change, wound and erythema.   Neurological: Negative for numbness and tingling.       Objective:      Physical Exam   Constitutional: She is oriented to person, place, and time. She appears well-developed. She is cooperative.  Non-toxic appearance. She does not appear ill. No " distress.   HENT:   Head: Normocephalic and atraumatic.   Ears:   Right Ear: Hearing and external ear normal.   Left Ear: Hearing and external ear normal.   Nose: Nose normal. No mucosal edema, rhinorrhea or nasal deformity. No epistaxis. Right sinus exhibits no maxillary sinus tenderness and no frontal sinus tenderness. Left sinus exhibits no maxillary sinus tenderness and no frontal sinus tenderness.   Mouth/Throat: Uvula is midline, oropharynx is clear and moist and mucous membranes are normal. No trismus in the jaw. Normal dentition. No uvula swelling. No posterior oropharyngeal erythema.   Eyes: Conjunctivae and lids are normal. Right eye exhibits no discharge. Left eye exhibits no discharge. No scleral icterus.   Neck: Trachea normal and phonation normal. Neck supple.   Cardiovascular: Normal rate and normal pulses.   Pulmonary/Chest: Effort normal. No respiratory distress.   Abdominal: Normal appearance.   Musculoskeletal: Normal range of motion.         General: No tenderness or deformity. Normal range of motion.      Comments: RUE: mild swelling to the wrist. No reproducible tenderness with palpation. FROM, NC intact. No scaphoid tenderness. Normal elbow and shoulder. Good hand grasp.    Neurological: She is alert and oriented to person, place, and time. She exhibits normal muscle tone. Coordination normal.   Skin: Skin is warm, dry, intact, not diaphoretic and not pale. No bruising and No erythema   Psychiatric: Her speech is normal and behavior is normal. Judgment and thought content normal.   Nursing note and vitals reviewed.        Assessment:       1. Injury of right wrist, sequela        XR WRIST COMPLETE 3 VIEWS RIGHT  Result Date: 6/5/2022  EXAMINATION: XR WRIST COMPLETE 3 VIEWS RIGHT CLINICAL HISTORY: Unspecified injury of right wrist, hand and finger(s), sequela TECHNIQUE: PA, lateral, and oblique views of the right wrist were performed. COMPARISON: None FINDINGS: Bones are well mineralized.   Overall alignment is within normal limits.  Carpus appears intact.  No displaced fracture, dislocation or destructive osseous process.  Joint spaces appear relatively maintained.  No subcutaneous emphysema or radiodense retained foreign body.   No acute displaced fracture-dislocation identified. Electronically signed by: Krishna Higuera MD Date: 06/05/2022 Time: 16:57    Plan:         Injury of right wrist, sequela  -     XR WRIST COMPLETE 3 VIEWS RIGHT; Future; Expected date: 06/05/2022  -     BANDAGE ELASTIC 4IN ACE NS  -     Ambulatory referral/consult to Orthopedics           Medical Decision Making:   History:   Old Medical Records: I decided to obtain old medical records.  Differential Diagnosis:   Differential diagnosis includes but is not limited to: fracture, dislocation, soft tissue injury   Independently Interpreted Test(s):   I have ordered and independently interpreted X-rays - see summary below.       <> Summary of X-Ray Reading(s): Plain films reviewed and interpreted by me- no acute fracture identified   Clinical Tests:   Radiological Study: Reviewed and Ordered  Urgent Care Management:  Patient presents with complaints of right wrist pain continued since a fall over 2 weeks ago. On exam, she is afebrile and nontoxic appearing. There is FROM and she is NV intact. Plain films negative for acute process. Referral for ortho placed on her behalf. Advised RICE therapy and NSAIDs. She verbalized understanding and agreed with plan.        Patient Instructions   If not allergic,take tylenol (acetominophen) for fever control, chills, or body aches every 4 hours. Do not exceed 4000 mg/ day.If not allergic, take Motrin (Ibuprofen) every 4 hours for fever, chills, pain or inflammation. Do not exceed 2400 mg/day. You can alternate taking tylenol and motrin.     You must understand that you've received an Urgent Care treatment only and that you may be released before all your medical problems are known or treated.  You, the patient, will arrange for follow up care as instructed.      Follow up with your PCP or specialty clinic as instructed in the next 2-3 days if not improved or as needed. You can call [phone] to schedule an appointment with appropriate provider.      If you condition worsens, we recommend that you receive another evaluation at the emergency room immediately or contact your primary medical clinic's after hours call service to discuss your concerns.      Please return here or go to the Emergency Department for any concerns or worsening condition.

## 2022-07-27 ENCOUNTER — PATIENT MESSAGE (OUTPATIENT)
Dept: OPTOMETRY | Facility: CLINIC | Age: 42
End: 2022-07-27
Payer: COMMERCIAL

## 2022-07-28 ENCOUNTER — OFFICE VISIT (OUTPATIENT)
Dept: OPTOMETRY | Facility: CLINIC | Age: 42
End: 2022-07-28
Payer: COMMERCIAL

## 2022-07-28 DIAGNOSIS — H16.229 KERATOCONJUNCT SICCA, NOT SPECIFIED AS SJOGREN'S, UNSP EYE: Primary | ICD-10-CM

## 2022-07-28 DIAGNOSIS — H04.123 DRY EYE SYNDROME, BILATERAL: ICD-10-CM

## 2022-07-28 PROCEDURE — 1160F RVW MEDS BY RX/DR IN RCRD: CPT | Mod: CPTII,S$GLB,, | Performed by: OPTOMETRIST

## 2022-07-28 PROCEDURE — 92012 INTRM OPH EXAM EST PATIENT: CPT | Mod: S$GLB,,, | Performed by: OPTOMETRIST

## 2022-07-28 PROCEDURE — 99999 PR PBB SHADOW E&M-EST. PATIENT-LVL III: CPT | Mod: PBBFAC,,, | Performed by: OPTOMETRIST

## 2022-07-28 PROCEDURE — 1160F PR REVIEW ALL MEDS BY PRESCRIBER/CLIN PHARMACIST DOCUMENTED: ICD-10-PCS | Mod: CPTII,S$GLB,, | Performed by: OPTOMETRIST

## 2022-07-28 PROCEDURE — 1159F MED LIST DOCD IN RCRD: CPT | Mod: CPTII,S$GLB,, | Performed by: OPTOMETRIST

## 2022-07-28 PROCEDURE — 99999 PR PBB SHADOW E&M-EST. PATIENT-LVL III: ICD-10-PCS | Mod: PBBFAC,,, | Performed by: OPTOMETRIST

## 2022-07-28 PROCEDURE — 1159F PR MEDICATION LIST DOCUMENTED IN MEDICAL RECORD: ICD-10-PCS | Mod: CPTII,S$GLB,, | Performed by: OPTOMETRIST

## 2022-07-28 PROCEDURE — 92012 PR EYE EXAM, EST PATIENT,INTERMED: ICD-10-PCS | Mod: S$GLB,,, | Performed by: OPTOMETRIST

## 2022-07-28 RX ORDER — FLUOROMETHOLONE 1 MG/ML
1 SUSPENSION/ DROPS OPHTHALMIC 4 TIMES DAILY
Qty: 5 ML | Refills: 0 | Status: SHIPPED | OUTPATIENT
Start: 2022-07-28 | End: 2022-07-28

## 2022-07-28 NOTE — PROGRESS NOTES
HPI     Concerns About Ocular Health      Additional comments: Patient Silke Fulton is a 42 year old   female.              Comments     Pt here for urgent care visit. Pt states that she has been having   constant 9-10/10 eye pain that has been shooting to the left side of her   brain since yesterday morning. Pt has been using Refresh AT's since   yesterday which has been causing eye burning OS. Pt states constant eye   watering, light sensitivity, and blurred VA OS. Pt's family is concerned   about a cornea tear OS.    DLS: 04/28/2022 with Dr. Cruz    Gtts:  1. Refresh     POHx:  1. Dry eye  2. Keratoconjunct sicca, not specified as Sjogren's, unsp eye  3. Astigmatism of both eyes, unspecified type  4. Personal history of malignant neoplasm of brain  5. Fourth nerve palsy of left eye  6. History of strabismus surgery          Last edited by Maria D Peoples on 7/28/2022  3:53 PM. (History)            Assessment /Plan     For exam results, see Encounter Report.    Keratoconjunct sicca, not specified as Sjogren's, unsp eye    Dry eye syndrome, bilateral    PF QID x 4 days then BID x 1 week    Increase refresh to QID/PRN      Return if condition worsens or does not improve

## 2022-09-20 ENCOUNTER — OFFICE VISIT (OUTPATIENT)
Dept: URGENT CARE | Facility: CLINIC | Age: 42
End: 2022-09-20
Payer: COMMERCIAL

## 2022-09-20 VITALS
OXYGEN SATURATION: 96 % | SYSTOLIC BLOOD PRESSURE: 122 MMHG | HEIGHT: 62 IN | BODY MASS INDEX: 34.96 KG/M2 | HEART RATE: 109 BPM | RESPIRATION RATE: 16 BRPM | WEIGHT: 190 LBS | DIASTOLIC BLOOD PRESSURE: 71 MMHG | TEMPERATURE: 101 F

## 2022-09-20 DIAGNOSIS — R50.9 FEVER, UNSPECIFIED FEVER CAUSE: ICD-10-CM

## 2022-09-20 DIAGNOSIS — R09.81 CONGESTION OF PARANASAL SINUS: ICD-10-CM

## 2022-09-20 DIAGNOSIS — Z11.52 ENCOUNTER FOR SCREENING FOR COVID-19: ICD-10-CM

## 2022-09-20 DIAGNOSIS — G44.209 ACUTE NON INTRACTABLE TENSION-TYPE HEADACHE: ICD-10-CM

## 2022-09-20 DIAGNOSIS — U07.1 COVID-19 VIRUS INFECTION: Primary | ICD-10-CM

## 2022-09-20 LAB
CTP QC/QA: YES
SARS-COV-2 RDRP RESP QL NAA+PROBE: POSITIVE

## 2022-09-20 PROCEDURE — 1159F MED LIST DOCD IN RCRD: CPT | Mod: CPTII,S$GLB,, | Performed by: PHYSICIAN ASSISTANT

## 2022-09-20 PROCEDURE — 99214 OFFICE O/P EST MOD 30 MIN: CPT | Mod: S$GLB,,, | Performed by: PHYSICIAN ASSISTANT

## 2022-09-20 PROCEDURE — 3008F BODY MASS INDEX DOCD: CPT | Mod: CPTII,S$GLB,, | Performed by: PHYSICIAN ASSISTANT

## 2022-09-20 PROCEDURE — 3078F PR MOST RECENT DIASTOLIC BLOOD PRESSURE < 80 MM HG: ICD-10-PCS | Mod: CPTII,S$GLB,, | Performed by: PHYSICIAN ASSISTANT

## 2022-09-20 PROCEDURE — 3008F PR BODY MASS INDEX (BMI) DOCUMENTED: ICD-10-PCS | Mod: CPTII,S$GLB,, | Performed by: PHYSICIAN ASSISTANT

## 2022-09-20 PROCEDURE — 3074F PR MOST RECENT SYSTOLIC BLOOD PRESSURE < 130 MM HG: ICD-10-PCS | Mod: CPTII,S$GLB,, | Performed by: PHYSICIAN ASSISTANT

## 2022-09-20 PROCEDURE — 3074F SYST BP LT 130 MM HG: CPT | Mod: CPTII,S$GLB,, | Performed by: PHYSICIAN ASSISTANT

## 2022-09-20 PROCEDURE — 99214 PR OFFICE/OUTPT VISIT, EST, LEVL IV, 30-39 MIN: ICD-10-PCS | Mod: S$GLB,,, | Performed by: PHYSICIAN ASSISTANT

## 2022-09-20 PROCEDURE — U0002: ICD-10-PCS | Mod: QW,S$GLB,, | Performed by: PHYSICIAN ASSISTANT

## 2022-09-20 PROCEDURE — U0002 COVID-19 LAB TEST NON-CDC: HCPCS | Mod: QW,S$GLB,, | Performed by: PHYSICIAN ASSISTANT

## 2022-09-20 PROCEDURE — 3078F DIAST BP <80 MM HG: CPT | Mod: CPTII,S$GLB,, | Performed by: PHYSICIAN ASSISTANT

## 2022-09-20 PROCEDURE — 1159F PR MEDICATION LIST DOCUMENTED IN MEDICAL RECORD: ICD-10-PCS | Mod: CPTII,S$GLB,, | Performed by: PHYSICIAN ASSISTANT

## 2022-09-20 NOTE — PROGRESS NOTES
"Subjective:       Patient ID: Silke Fulton is a 42 y.o. female.    Vitals:  height is 5' 2" (1.575 m) and weight is 86.2 kg (190 lb). Her oral temperature is 101 °F (38.3 °C) (abnormal). Her blood pressure is 122/71 and her pulse is 109. Her respiration is 16 and oxygen saturation is 96%.     Chief Complaint: Fever    This is a 42 y.o. female with a history of migraines and previous brain tumor resection who presents urgent care clinic with her  for evaluation.  Patient complain of symptoms of started today including headache, excessive sneezing, fever (101.6 oral), chills, runny nose (clear), post nasal drip, body aches (hips) x 1 day. At-home COVID test positive.  Not taking anything for symptoms.  No other associated.    Fever   This is a new problem. The current episode started today. The problem occurs constantly. The problem has been gradually worsening. The maximum temperature noted was 101 to 101.9 F. The temperature was taken using an oral thermometer. Associated symptoms include congestion, headaches and muscle aches. Pertinent negatives include no abdominal pain, chest pain, coughing, diarrhea, ear pain, nausea, rash, sore throat, urinary pain, vomiting or wheezing. She has tried nothing for the symptoms.     Constitution: Positive for fever. Negative for activity change, appetite change, chills, sweating, fatigue and generalized weakness.   HENT:  Positive for congestion and postnasal drip. Negative for ear pain, hearing loss, facial swelling, sinus pain, sinus pressure, sore throat, trouble swallowing and voice change.    Neck: Negative for neck pain, neck stiffness and painful lymph nodes.   Cardiovascular:  Negative for chest pain, leg swelling, palpitations, sob on exertion and passing out.   Eyes:  Negative for eye discharge, eye pain, photophobia, vision loss, double vision and blurred vision.   Respiratory:  Negative for chest tightness, cough, sputum production, bloody sputum, " COPD, shortness of breath, stridor, wheezing and asthma.    Gastrointestinal:  Negative for abdominal pain, nausea, vomiting, constipation, diarrhea, bright red blood in stool, rectal bleeding, heartburn and bowel incontinence.   Genitourinary:  Negative for dysuria, frequency, urgency, urine decreased, flank pain, bladder incontinence and hematuria.   Musculoskeletal:  Positive for muscle ache. Negative for trauma, joint pain, joint swelling, abnormal ROM of joint and muscle cramps.   Skin:  Negative for color change, pale, rash and wound.   Allergic/Immunologic: Negative for seasonal allergies, asthma and immunocompromised state.   Neurological:  Positive for headaches and history of migraines. Negative for dizziness, history of vertigo, light-headedness, passing out, facial drooping, speech difficulty, coordination disturbances, loss of balance, disorientation, altered mental status, loss of consciousness, numbness, tingling and seizures.   Hematologic/Lymphatic: Negative for swollen lymph nodes, easy bruising/bleeding and trouble clotting. Does not bruise/bleed easily.   Psychiatric/Behavioral:  Negative for altered mental status and disorientation.        Past Medical History:   Diagnosis Date    Acute gastritis without mention of hemorrhage     Esophageal reflux     Meningitis     staph aureus    Migraine without aura, without mention of intractable migraine without mention of status migrainosus     Personal history of malignant neoplasm of brain     cerebellar astrocytoma    Tarsal tunnel syndrome        Objective:      Physical Exam   Constitutional: She is oriented to person, place, and time. She appears well-developed. She is cooperative.  Non-toxic appearance. She does not appear ill. No distress.   HENT:   Head: Normocephalic and atraumatic.   Ears:   Right Ear: Hearing, external ear and ear canal normal. No drainage, swelling or tenderness.   Left Ear: Hearing, external ear and ear canal normal. No  drainage, swelling or tenderness.   Nose: Nose normal. No rhinorrhea or purulent discharge. Right sinus exhibits no maxillary sinus tenderness and no frontal sinus tenderness. Left sinus exhibits no maxillary sinus tenderness and no frontal sinus tenderness.   Mouth/Throat: Uvula is midline, oropharynx is clear and moist and mucous membranes are normal. No oral lesions. No trismus in the jaw. No uvula swelling. No posterior oropharyngeal edema. No tonsillar exudate.   Eyes: Conjunctivae, EOM and lids are normal. Pupils are equal, round, and reactive to light. No visual field deficit is present. Right eye exhibits no discharge. Left eye exhibits no discharge. Right conjunctiva is not injected. Right conjunctiva has no hemorrhage. Left conjunctiva is not injected. Left conjunctiva has no hemorrhage. Extraocular movement intact vision grossly intact gaze aligned appropriately   Neck: Neck supple. No neck rigidity present.   Cardiovascular: Normal rate, regular rhythm, normal heart sounds and normal pulses.   No murmur heard.     Comments: No leg edema, calf tenderness/erythema, or Homans sign bilaterally.       Pulmonary/Chest: Effort normal and breath sounds normal. No accessory muscle usage or stridor. No respiratory distress. She has no wheezes. She exhibits no tenderness.   Abdominal: Normal appearance. She exhibits no distension and no mass. Soft. There is no abdominal tenderness. There is no rebound and no guarding.   Musculoskeletal: Normal range of motion.         General: Normal range of motion.      Right lower leg: No edema.      Left lower leg: No edema.      Comments: Moves all extremities with normal tone, strength, and ROM.  Gait normal.   Lymphadenopathy:     She has no cervical adenopathy.   Neurological: no focal deficit. She is alert, oriented to person, place, and time and at baseline. She has normal motor skills and normal sensation. She displays no weakness, facial symmetry, normal reflexes and no  dysarthria. No cranial nerve deficit or sensory deficit. She exhibits normal muscle tone. She has a normal Finger-Nose-Finger Test. Coordination: Heel to shin test normal. She shows no pronator drift. She displays no seizure activity. Gait and coordination normal. Coordination normal. GCS eye subscore is 4. GCS verbal subscore is 5. GCS motor subscore is 6.   Skin: Skin is warm, dry, not diaphoretic and no rash. Capillary refill takes less than 2 seconds.   Psychiatric: Her speech is normal and behavior is normal. Mood and thought content normal.   Nursing note and vitals reviewed.        Results for orders placed or performed in visit on 09/20/22   POCT COVID-19 Rapid Screening   Result Value Ref Range    POC Rapid COVID Positive (A) Negative     Acceptable Yes        Assessment:       1. COVID-19 virus infection    2. Fever, unspecified fever cause    3. Congestion of paranasal sinus    4. Acute non intractable tension-type headache    5. Encounter for screening for COVID-19          Nontoxic appearing. Vitals are stable. Patient presents for COVID nasal swab testing. Patient is concerned for possible exposure. Rapid covid positive.   All diagnostic testing personally reviewed and interpreted.   Patient has symptoms at this time which is consistent with above diagnosis.        Patient was recommended OTC treatments for their symptoms. Patient was also prescribed medications for their symptoms.   Patient was also prescribed  COVID monitoring program.  Patient has 1 complication risk (CALCULATED PER EPIC)  so was not referred for EUA antibody infusion.      We did discuss treatment options including Emergency FDA authorization use of Paxlovid.  We did discuss the warnings/precaustions/alternatives/side effects/benefits/risks/expected course/rebound of this treatment.  Patient would like to proceed.  This was sent to patient's pharmacy on file and was confirmed with antiviral . Patient is aware  that all [facility] retail pharmacies have this medication available. Also reviewed his drug interactions with the [facility] drug .  For appropriate treatment of this antiviral treatment, patient understands that it is their responsibility that all of their current medication must be informed to our clinic staff and confirmed with our Epic system which includes prescribed medication as well as over-the-counter supplements.  Patient was recommended to withhold Nurtec while on pack COVID due to drug interaction.      Patient will need to quarantine for total 5 days and on day 6 May come out quarantine with full masks for an additional 5 days ( for total 10 days from symptoms/covid positive test).     present and agree with the entirety of the above.     Patient was counseled, explained with the test results meaning, expected course, and answered all of questions. They can also receive results via my chart.  Printed and verbal COVID guidelines were given.   Recommend follow-up PCP in the next 2-3 days if new or worsening symptoms.    Patient understands that they received an Urgent Care treatment only and that they may be released before all your medical problems are known or treated. Strict ED versus clinic precautions given.  Patient verbalized understanding and agreed with plan of care.    Note dictated with voice recognition software, please excuse any grammatical errors.    Plan:         COVID-19 virus infection  -     nirmatrelvir-ritonavir 300 mg (150 mg x 2)-100 mg copackaged tablets (EUA); Take 3 tablets by mouth 2 (two) times daily for 5 days. Each dose contains 2 nirmatrelvir (pink tablets) and 1 ritonavir (white tablet). Take all 3 tablets together  Dispense: 30 tablet; Refill: 0    Fever, unspecified fever cause  -     POCT COVID-19 Rapid Screening    Congestion of paranasal sinus    Acute non intractable tension-type headache    Encounter for screening for COVID-19            Additional MDM:      Heart Failure Score:   COPD = No    Patient Instructions     PLEASE READ YOUR DISCHARGE INSTRUCTIONS ENTIRELY AS IT CONTAINS IMPORTANT INFORMATION.    Patient had covid testing done today.  Discussed corona virus precautions and reviewed CDC FAC; printed a copy for patient.  I discussed to continue to monitor their symptoms. Discussed that if their symptoms persist or worsen to seek re-evaluation. Clinic vs. ER precautions were given.  Patient verbalized understanding and agreed with the entire plan of care.      If you tested positive and have symptoms, you must isolate for 5 days starting today OR day of the positive test. After 5 days (ON DAY #6), if your symptoms have improved and you have not had fever on day 5, you can return to the community on day #6- NO TESTING REQUIRED to return to community! If no fever without fever reducing meds for 24 hours, before coming out of quarantine. After your 5 days of isolation are completed, the CDC recommends strict mask use for the first 5 days (day #6-10) that you come out of isolation.  MAY COME OUT OF QUARANTINE ON DAY#6 DATE**9/25/22 BUT CONTINUE STRICT MASKS FOR ANOTHER 5 DAYS. QUARANTINE START DATE** 9/20/22    PER CDC GUIDELINES, YOU MAY NOT TRAVEL UNTIL DAY #11; WHICH IS 9/30/22    You should continue to wear a well-fitting mask around others at home and in public for 5 additional days (day 6 through day 10) after the end of your 5-day isolation period. If you are unable to wear a mask when around others, you should continue to isolate for a full 10 days. Avoid people who have weakened immune systems or are more likely to get very sick from COVID-19, and nursing homes and other high-risk settings, until after at least 10 days.      - Reviewed radiographs and all diagnostic testing with patient/family.    - Rest.  Drink plenty of fluids.    - Tylenol OR anti-inflammatory (NSAIDs, ibuprofen, aleve, motrin) as directed as needed for fever/pain.  For Tylenol, do  not exceed 3000 mg/ day. If no contraindication or allergies.  -OK to supplement with OTC DayQuil, NyQuil or TheraFlu every 6 hours as needed for cough and congestion.  Use caution of total amount of Tylenol/acetaminophen per day.      -Below are suggestions for symptomatic relief:              -Salt water gargles to soothe throat pain.              -Chloroseptic spray also helps to numb throat pain.              -Nasal saline spray reduces inflammation and dryness.              -Warm face compresses to help with facial sinus pain/pressure.              -Vicks vapor rub at night.             **may also supplement with OTC nasal spray to help with inflammation and congestion. Wean to off when you nose becomes to dry or bleed. Also use nasal saline twice a day to help with dryness.               -Flonase OTC or Nasacort OTC  once or twice a day for nasal/sinus congestion. DON'T USE IF YOU HAVE GLAUCOMA. CHECK WITH YOUR PHARMACIST/PHYSICIAN.              -Simple foods like chicken noodle soup.              -Mucinex DM (ANY COUGH EXPECTORANT-- guaifenesin) for cough or chest congestion with mucus during the day time. Delsym or robitussin (ANY COUGH SUPPRESSANT- dextromethorphan) helps with coughing at night. Mucinex-DM if you have chest congestion or sputum (caution if history of high blood pressure or palpitations).              -Zyrtec/Claritin/xyzal during the day time  & Benadryl at night (only if severe runny nose) may help with allergies and runny nose. Add decongestant if you have nasal/sinus congestion/sinus pressure/ear fullness sensation. (see below)              -may take OTC meclizine as needed for dizziness or nausea.     Caution with use of Decongestant meds:  -If you DO NOT have Hypertension or any history of palpitations, it is ok to take over the counter Sudafed or Mucinex D or Allegra-D or Claritin-D or Zyrtec-D.  -If you do take one of the above, it is ok to combine that with plain over the counter  Mucinex or Allegra or Claritin or Zyrtec. If, for example, you are taking Zyrtec -D, you can combine that with Mucinex, but not Mucinex-D.  If you are taking Mucinex-D, you can combine that with plain Allegra or Claritin or Zyrtec.     -Do not combine pseudophed or phenylephrine with any other brand allergy-D for DECONGESTANT.   -Or vice versa, you can you take plain allergy medications (allegra/claritin/zyrtec with NO Decongestant) and ADD OTC pseudophed or phenelyphrine 2-3 times a day (or every 4-6 hours needed). Avoid taking decongestant late at night or with caffeine as it can keep you up or cause jittery feeling.     -If you DO have Hypertension , anxiety, or palpitations, it is safe to take Coricidin HBP for relief of sinus symptoms.      For your GI symptoms:  -Use gatorade/pedialyte or rehydration packets to help stay hydrated. Vitamin water and plain water do not contain rehydrating electrolytes.  -Increase clear liquids (water, gatorade, pedialyte, broths, jello, etc) Hold off on solids for 12-18 hours. Then advance to BRAT diet (banana, rice, applesauce, tea, toast/crackers), then advance further as tolerated. Avoid spicy or fatty foods.   -May take Emitrol OTC as needed for nausea.   -Use Peptobismol or Immodium to help alleviate your diarrhea symptoms.   -Take mylanta or simethicone for bloating or gas pain.   -Take pepcid or omeprazole if you have heartburn or reflux sensation.  -Avoid imodium unless you have more than 6 loose stools in 24 hours. Take 1 dose and monitor to see if you can repeat AS IT WILL CAUSE CONSTIPATION.  -Wash hands frequently while sick. Avoid ibuprofen or other NSAIDS until you are well.   -Please go to the ER if you experience worsening abdominal pain, blood in your vomit or stool, high fever, dizziness, fainting, swelling of your abdomen, inability to pass gas or stool, or inability to urinate.         -You must understand that you've received an Urgent Care treatment only  and that you may be released before all your medical problems are known or treated. You, the patient, will arrange for follow up care as instructed. Please arrange follow up with your primary medical clinic within 2-5 days if your signs and symptoms have not resolved or worsen.     - Follow up with your PCP or specialty clinic as directed.  You can call [phone] or [phone] to schedule an appointment with the appropriate provider.  Schedule CENTER is open Mon-Friday 8-5pm (excluded holidays).    - If your condition worsens or fails to improve we recommend that you receive another evaluation at the emergency room immediately or contact your primary medical clinic to discuss your concerns.            Prevention steps for patients with confirmed or suspected COVID-19  Stay home and stay away from family members and friends. The CDC says, you can leave home after these three things have happened: 1) You have had no fever for at least 24 hours (that is one full day of no fever without the use of medicine that reduces fevers) 2) AND other symptoms have improved (for example, when your cough or shortness of breath have improved) 3) AND at least 10 days have passed since your symptoms first appeared OR after 7-10 days passed from first positive test.  Separate yourself from other people and animals in your home.  Call ahead before visiting your doctor.  Wear a facemask.  Cover your coughs and sneezes.  Wash your hands often with soap and water; hand  can be used, too.  Avoid sharing personal household items.  Wipe down surfaces used daily.  Monitor your symptoms. Seek prompt medical attention if your illness is worsening (e.g., difficulty breathing).   Before seeking care, call your healthcare provider.  If you have a medical emergency and need to call 911, notify the dispatch personnel that you have, or are being evaluated for COVID-19. If possible, put on a facemask before emergency medical services  arrive.        Recommended precautions for household members, intimate partners, and caregivers in a home setting of a patient with symptomatic laboratory-confirmed COVID-19 or a patient under investigation.  Household members, intimate partners, and caregivers in the home setting awaiting tests results have close contact with a person with symptomatic, laboratory-confirmed COVID-19 or a person under investigation. Close contacts should monitor their health; they should call their provider right away if they develop symptoms suggestive of COVID-19 (e.g., fever, cough, shortness of breath).    Close contacts should also follow these recommendations:  Make sure that you understand and can help the patient follow their provider's instructions for medication(s) and care. You should help the patient with basic needs in the home and provide support for getting groceries, prescriptions, and other personal needs.  Monitor the patient's symptoms. If the patient is getting sicker, call his or her healthcare provider and tell them that the patient has laboratory-confirmed COVID-19. If the patient has a medical emergency and you need to call 911, notify the dispatch personnel that the patient has, or is being evaluated for COVID-19.  Household members should stay in another room or be  from the patient. Household members should use a separate bedroom and bathroom, if available.  Prohibit visitors.  Household members should care for any pets in the home.  Make sure that shared spaces in the home have good air flow, such as by an air conditioner or an opened window, weather permitting.  Perform hand hygiene frequently. Wash your hands often with soap and water for at least 20 seconds or use an alcohol-based hand  (that contains > 60% alcohol) covering all surfaces of your hands and rubbing them together until they feel dry. Soap and water should be used preferentially.  Avoid touching your eyes, nose, and  mouth.  The patient should wear a facemask. If the patient is not able to wear a facemask (for example, because it causes trouble breathing), caregivers should wear a mask when they are in the same room as the patient.  Wear a disposable facemask and gloves when you touch or have contact with the patient's blood, stool, or body fluids, such as saliva, sputum, nasal mucus, vomit, urine.  Throw out disposable facemasks and gloves after using them. Do not reuse.  When removing personal protective equipment, first remove and dispose of gloves. Then, immediately clean your hands with soap and water or alcohol-based hand . Next, remove and dispose of facemask, and immediately clean your hands again with soap and water or alcohol-based hand .  You should not share dishes, drinking glasses, cups, eating utensils, towels, bedding, or other items with the patient. After the patient uses these items, you should wash them thoroughly (see below Wash laundry thoroughly).  Clean all high-touch surfaces, such as counters, tabletops, doorknobs, bathroom fixtures, toilets, phones, keyboards, tablets, and bedside tables, every day. Also, clean any surfaces that may have blood, stool, or body fluids on them.  Use a household cleaning spray or wipe, according to the label instructions. Labels contain instructions for safe and effective use of the cleaning product including precautions you should take when applying the product, such as wearing gloves and making sure you have good ventilation during use of the product.  Wash laundry thoroughly.  Immediately remove and wash clothes or bedding that have blood, stool, or body fluids on them.  Wear disposable gloves while handling soiled items and keep soiled items away from your body. Clean your hands (with soap and water or an alcohol-based hand ) immediately after removing your gloves.  Read and follow directions on labels of laundry or clothing items and  detergent. In general, using a normal laundry detergent according to washing machine instructions and dry thoroughly using the warmest temperatures recommended on the clothing label.  Place all used disposable gloves, facemasks, and other contaminated items in a lined container before disposing of them with other household waste. Clean your hands (with soap and water or an alcohol-based hand ) immediately after handling these items. Soap and water should be used preferentially if hands are visibly dirty.  Discuss any additional questions with your [facility] or local health department or healthcare provider. Check available hours when contacting your local health department.    For more information see CDC link below.      https://www.cdc.gov/coronavirus/2019-ncov/hcp/guidance-prevent-spread.html#precautions        Sources:  [facility], [place] [facility]          Instructions for Home Care of Patients and Caretakers with Coronavirus Disease 2019  Limit visitors to the home.  Older persons and those that have chronic medical conditions such as diabetes, lung and heart disease are at increased risk for illness.   If possible, patients should use a separate bedroom while recovering. Caregivers and household members should avoid prolonged contact with the patient which means to stay 6 feet away and avoid contact with cough droplets.  When close contact is necessary, wash your hands before and immediately after contact.   Perform hand hygiene frequently. Wash your hands often with soap and water for at least 20 seconds or use an alcohol-based hand , covering all surfaces of your hands and rubbing them together until they feel dry.   Avoid touching your eyes, nose, and mouth with unwashed hands.  Avoid sharing household items with the patient. You should not share dishes, drinking glasses, cups, eating utensils, towels, bedding, or other items. After the patient uses these items, you  should wash them thoroughly.  Wash laundry thoroughly.   Immediately remove and wash clothes or bedding that have blood, stool, or body fluids on them.  Clean all high-touch surfaces, such as counters, tabletops, doorknobs, bathroom fixtures, toilets, phones, keyboards, tablets, and bedside tables, every day.   Use a household cleaning spray or wipe, according to the label instructions. Labels contain instructions for safe and effective use of the cleaning product including precautions you should take when applying the product, such as wearing gloves and making sure you have good ventilation during use of the product.    For more information see CDC link below.      https://www.cdc.gov/coronavirus/2019-ncov/hcp/guidance-prevent-spread.html#precautions               If your symptoms worsen or if you have any other concerns, please contact [facility] at [phone].

## 2022-09-20 NOTE — LETTER
[address],  [address] ? [place], [address] ? Phone [phone] ? Fax [phone]           Return to Work/School    Patient: Silke Fulton  YOB: 1980   Date: 09/20/2022      To Whom It May Concern:     Silke Fulton was in contact with/seen in my office on 09/20/2022. COVID-19 is present in our communities across the [facility]. Not all patients are eligible or appropriate to be tested. In this situation, your employee meets the following criteria:     Silke Fulton has met the criteria for COVID-19 testing and has a POSITIVE result. NO REPEAT COVID TESTING AFTER COMPLETING QUARANTINE IS RECOMMENDED OR REQUIRED!    They can return to work/school once they are asymptomatic for 24 hours without the use of fever reducing medications AND at least 5 days from the  first positive result. Quarantine start date 9/20/22 and come out of quarantine on day #6 on 9/25/2022 BUT CONTINUE MASKS FOR ANOTHER 5 DAYS until day #11. After 5 days, if your symptoms have improved and you have not had fever on day 5, you can return to the community on day 6-     PER CDC GUIDELINES, YOU MAY NOT TRAVEL UNTIL DAY #11; WHICH IS 9/30/22     STRICT MASKS precautions. Masks must be well fitted and closed (mask at all times in public) FOR ANOTHER 5 DAYS.   You should continue to wear a well-fitting mask around others at home and in public for 5 additional days (day 6 through day 10) after the end of your 5-day isolation period. If you are unable to wear a mask when around others, you should continue to isolate for a full 10 days. Avoid people who have weakened immune systems or are more likely to get very sick from COVID-19, and nursing homes and other high-risk settings, until after at least 10 days.      If you have any questions or concerns, or if I can be of further assistance, please do not hesitate to contact me.     Sincerely,      [facility]

## 2022-09-21 NOTE — PATIENT INSTRUCTIONS
PLEASE READ YOUR DISCHARGE INSTRUCTIONS ENTIRELY AS IT CONTAINS IMPORTANT INFORMATION.    Patient had covid testing done today.  Discussed corona virus precautions and reviewed CDC FAC; printed a copy for patient.  I discussed to continue to monitor their symptoms. Discussed that if their symptoms persist or worsen to seek re-evaluation. Clinic vs. ER precautions were given.  Patient verbalized understanding and agreed with the entire plan of care.      If you tested positive and have symptoms, you must isolate for 5 days starting today OR day of the positive test. After 5 days (ON DAY #6), if your symptoms have improved and you have not had fever on day 5, you can return to the community on day #6- NO TESTING REQUIRED to return to community! If no fever without fever reducing meds for 24 hours, before coming out of quarantine. After your 5 days of isolation are completed, the CDC recommends strict mask use for the first 5 days (day #6-10) that you come out of isolation.  MAY COME OUT OF QUARANTINE ON DAY#6 DATE**9/25/22 BUT CONTINUE STRICT MASKS FOR ANOTHER 5 DAYS. QUARANTINE START DATE** 9/20/22    PER CDC GUIDELINES, YOU MAY NOT TRAVEL UNTIL DAY #11; WHICH IS 9/30/22    You should continue to wear a well-fitting mask around others at home and in public for 5 additional days (day 6 through day 10) after the end of your 5-day isolation period. If you are unable to wear a mask when around others, you should continue to isolate for a full 10 days. Avoid people who have weakened immune systems or are more likely to get very sick from COVID-19, and nursing homes and other high-risk settings, until after at least 10 days.      - Reviewed radiographs and all diagnostic testing with patient/family.    - Rest.  Drink plenty of fluids.    - Tylenol OR anti-inflammatory (NSAIDs, ibuprofen, aleve, motrin) as directed as needed for fever/pain.  For Tylenol, do not exceed 3000 mg/ day. If no contraindication or  allergies.  -OK to supplement with OTC DayQuil, NyQuil or TheraFlu every 6 hours as needed for cough and congestion.  Use caution of total amount of Tylenol/acetaminophen per day.      -Below are suggestions for symptomatic relief:              -Salt water gargles to soothe throat pain.              -Chloroseptic spray also helps to numb throat pain.              -Nasal saline spray reduces inflammation and dryness.              -Warm face compresses to help with facial sinus pain/pressure.              -Vicks vapor rub at night.             **may also supplement with OTC nasal spray to help with inflammation and congestion. Wean to off when you nose becomes to dry or bleed. Also use nasal saline twice a day to help with dryness.               -Flonase OTC or Nasacort OTC  once or twice a day for nasal/sinus congestion. DON'T USE IF YOU HAVE GLAUCOMA. CHECK WITH YOUR PHARMACIST/PHYSICIAN.              -Simple foods like chicken noodle soup.              -Mucinex DM (ANY COUGH EXPECTORANT-- guaifenesin) for cough or chest congestion with mucus during the day time. Delsym or robitussin (ANY COUGH SUPPRESSANT- dextromethorphan) helps with coughing at night. Mucinex-DM if you have chest congestion or sputum (caution if history of high blood pressure or palpitations).              -Zyrtec/Claritin/xyzal during the day time  & Benadryl at night (only if severe runny nose) may help with allergies and runny nose. Add decongestant if you have nasal/sinus congestion/sinus pressure/ear fullness sensation. (see below)              -may take OTC meclizine as needed for dizziness or nausea.     Caution with use of Decongestant meds:  -If you DO NOT have Hypertension or any history of palpitations, it is ok to take over the counter Sudafed or Mucinex D or Allegra-D or Claritin-D or Zyrtec-D.  -If you do take one of the above, it is ok to combine that with plain over the counter Mucinex or Allegra or Claritin or Zyrtec. If, for  example, you are taking Zyrtec -D, you can combine that with Mucinex, but not Mucinex-D.  If you are taking Mucinex-D, you can combine that with plain Allegra or Claritin or Zyrtec.     -Do not combine pseudophed or phenylephrine with any other brand allergy-D for DECONGESTANT.   -Or vice versa, you can you take plain allergy medications (allegra/claritin/zyrtec with NO Decongestant) and ADD OTC pseudophed or phenelyphrine 2-3 times a day (or every 4-6 hours needed). Avoid taking decongestant late at night or with caffeine as it can keep you up or cause jittery feeling.     -If you DO have Hypertension , anxiety, or palpitations, it is safe to take Coricidin HBP for relief of sinus symptoms.      For your GI symptoms:  -Use gatorade/pedialyte or rehydration packets to help stay hydrated. Vitamin water and plain water do not contain rehydrating electrolytes.  -Increase clear liquids (water, gatorade, pedialyte, broths, jello, etc) Hold off on solids for 12-18 hours. Then advance to BRAT diet (banana, rice, applesauce, tea, toast/crackers), then advance further as tolerated. Avoid spicy or fatty foods.   -May take Emitrol OTC as needed for nausea.   -Use Peptobismol or Immodium to help alleviate your diarrhea symptoms.   -Take mylanta or simethicone for bloating or gas pain.   -Take pepcid or omeprazole if you have heartburn or reflux sensation.  -Avoid imodium unless you have more than 6 loose stools in 24 hours. Take 1 dose and monitor to see if you can repeat AS IT WILL CAUSE CONSTIPATION.  -Wash hands frequently while sick. Avoid ibuprofen or other NSAIDS until you are well.   -Please go to the ER if you experience worsening abdominal pain, blood in your vomit or stool, high fever, dizziness, fainting, swelling of your abdomen, inability to pass gas or stool, or inability to urinate.         -You must understand that you've received an Urgent Care treatment only and that you may be released before all your medical  problems are known or treated. You, the patient, will arrange for follow up care as instructed. Please arrange follow up with your primary medical clinic within 2-5 days if your signs and symptoms have not resolved or worsen.     - Follow up with your PCP or specialty clinic as directed.  You can call [phone] or [phone] to schedule an appointment with the appropriate provider.  Schedule CENTER is open Mon-Friday 8-5pm (excluded holidays).    - If your condition worsens or fails to improve we recommend that you receive another evaluation at the emergency room immediately or contact your primary medical clinic to discuss your concerns.            Prevention steps for patients with confirmed or suspected COVID-19  Stay home and stay away from family members and friends. The CDC says, you can leave home after these three things have happened: 1) You have had no fever for at least 24 hours (that is one full day of no fever without the use of medicine that reduces fevers) 2) AND other symptoms have improved (for example, when your cough or shortness of breath have improved) 3) AND at least 10 days have passed since your symptoms first appeared OR after 7-10 days passed from first positive test.  Separate yourself from other people and animals in your home.  Call ahead before visiting your doctor.  Wear a facemask.  Cover your coughs and sneezes.  Wash your hands often with soap and water; hand  can be used, too.  Avoid sharing personal household items.  Wipe down surfaces used daily.  Monitor your symptoms. Seek prompt medical attention if your illness is worsening (e.g., difficulty breathing).   Before seeking care, call your healthcare provider.  If you have a medical emergency and need to call 911, notify the dispatch personnel that you have, or are being evaluated for COVID-19. If possible, put on a facemask before emergency medical services arrive.        Recommended precautions for household  members, intimate partners, and caregivers in a home setting of a patient with symptomatic laboratory-confirmed COVID-19 or a patient under investigation.  Household members, intimate partners, and caregivers in the home setting awaiting tests results have close contact with a person with symptomatic, laboratory-confirmed COVID-19 or a person under investigation. Close contacts should monitor their health; they should call their provider right away if they develop symptoms suggestive of COVID-19 (e.g., fever, cough, shortness of breath).    Close contacts should also follow these recommendations:  Make sure that you understand and can help the patient follow their provider's instructions for medication(s) and care. You should help the patient with basic needs in the home and provide support for getting groceries, prescriptions, and other personal needs.  Monitor the patient's symptoms. If the patient is getting sicker, call his or her healthcare provider and tell them that the patient has laboratory-confirmed COVID-19. If the patient has a medical emergency and you need to call 911, notify the dispatch personnel that the patient has, or is being evaluated for COVID-19.  Household members should stay in another room or be  from the patient. Household members should use a separate bedroom and bathroom, if available.  Prohibit visitors.  Household members should care for any pets in the home.  Make sure that shared spaces in the home have good air flow, such as by an air conditioner or an opened window, weather permitting.  Perform hand hygiene frequently. Wash your hands often with soap and water for at least 20 seconds or use an alcohol-based hand  (that contains > 60% alcohol) covering all surfaces of your hands and rubbing them together until they feel dry. Soap and water should be used preferentially.  Avoid touching your eyes, nose, and mouth.  The patient should wear a facemask. If the patient  is not able to wear a facemask (for example, because it causes trouble breathing), caregivers should wear a mask when they are in the same room as the patient.  Wear a disposable facemask and gloves when you touch or have contact with the patient's blood, stool, or body fluids, such as saliva, sputum, nasal mucus, vomit, urine.  Throw out disposable facemasks and gloves after using them. Do not reuse.  When removing personal protective equipment, first remove and dispose of gloves. Then, immediately clean your hands with soap and water or alcohol-based hand . Next, remove and dispose of facemask, and immediately clean your hands again with soap and water or alcohol-based hand .  You should not share dishes, drinking glasses, cups, eating utensils, towels, bedding, or other items with the patient. After the patient uses these items, you should wash them thoroughly (see below Wash laundry thoroughly).  Clean all high-touch surfaces, such as counters, tabletops, doorknobs, bathroom fixtures, toilets, phones, keyboards, tablets, and bedside tables, every day. Also, clean any surfaces that may have blood, stool, or body fluids on them.  Use a household cleaning spray or wipe, according to the label instructions. Labels contain instructions for safe and effective use of the cleaning product including precautions you should take when applying the product, such as wearing gloves and making sure you have good ventilation during use of the product.  Wash laundry thoroughly.  Immediately remove and wash clothes or bedding that have blood, stool, or body fluids on them.  Wear disposable gloves while handling soiled items and keep soiled items away from your body. Clean your hands (with soap and water or an alcohol-based hand ) immediately after removing your gloves.  Read and follow directions on labels of laundry or clothing items and detergent. In general, using a normal laundry detergent  according to washing machine instructions and dry thoroughly using the warmest temperatures recommended on the clothing label.  Place all used disposable gloves, facemasks, and other contaminated items in a lined container before disposing of them with other household waste. Clean your hands (with soap and water or an alcohol-based hand ) immediately after handling these items. Soap and water should be used preferentially if hands are visibly dirty.  Discuss any additional questions with your state or local health department or healthcare provider. Check available hours when contacting your local health department.    For more information see CDC link below.      https://www.cdc.gov/coronavirus/2019-ncov/hcp/guidance-prevent-spread.html#precautions        Sources:  [facility], [place] [facility]          Instructions for Home Care of Patients and Caretakers with Coronavirus Disease 2019  Limit visitors to the home.  Older persons and those that have chronic medical conditions such as diabetes, lung and heart disease are at increased risk for illness.   If possible, patients should use a separate bedroom while recovering. Caregivers and household members should avoid prolonged contact with the patient which means to stay 6 feet away and avoid contact with cough droplets.  When close contact is necessary, wash your hands before and immediately after contact.   Perform hand hygiene frequently. Wash your hands often with soap and water for at least 20 seconds or use an alcohol-based hand , covering all surfaces of your hands and rubbing them together until they feel dry.   Avoid touching your eyes, nose, and mouth with unwashed hands.  Avoid sharing household items with the patient. You should not share dishes, drinking glasses, cups, eating utensils, towels, bedding, or other items. After the patient uses these items, you should wash them thoroughly.  Wash laundry thoroughly.    Immediately remove and wash clothes or bedding that have blood, stool, or body fluids on them.  Clean all high-touch surfaces, such as counters, tabletops, doorknobs, bathroom fixtures, toilets, phones, keyboards, tablets, and bedside tables, every day.   Use a household cleaning spray or wipe, according to the label instructions. Labels contain instructions for safe and effective use of the cleaning product including precautions you should take when applying the product, such as wearing gloves and making sure you have good ventilation during use of the product.    For more information see CDC link below.      https://www.cdc.gov/coronavirus/2019-ncov/hcp/guidance-prevent-spread.html#precautions               If your symptoms worsen or if you have any other concerns, please contact [facility] at [phone].

## 2022-12-14 ENCOUNTER — TELEPHONE (OUTPATIENT)
Dept: NEUROLOGY | Facility: CLINIC | Age: 42
End: 2022-12-14
Payer: COMMERCIAL

## 2022-12-14 NOTE — TELEPHONE ENCOUNTER
Patient requested appointment through portal. Attempted to call the patient, LVM to return the call

## 2023-02-13 ENCOUNTER — OFFICE VISIT (OUTPATIENT)
Dept: OBSTETRICS AND GYNECOLOGY | Facility: CLINIC | Age: 43
End: 2023-02-13
Payer: COMMERCIAL

## 2023-02-13 VITALS
WEIGHT: 200.38 LBS | SYSTOLIC BLOOD PRESSURE: 122 MMHG | DIASTOLIC BLOOD PRESSURE: 80 MMHG | BODY MASS INDEX: 36.87 KG/M2 | HEIGHT: 62 IN

## 2023-02-13 DIAGNOSIS — Z12.31 SCREENING MAMMOGRAM FOR HIGH-RISK PATIENT: ICD-10-CM

## 2023-02-13 DIAGNOSIS — Z01.419 VISIT FOR GYNECOLOGIC EXAMINATION: Primary | ICD-10-CM

## 2023-02-13 PROCEDURE — 3008F PR BODY MASS INDEX (BMI) DOCUMENTED: ICD-10-PCS | Mod: CPTII,S$GLB,, | Performed by: OBSTETRICS & GYNECOLOGY

## 2023-02-13 PROCEDURE — 3074F SYST BP LT 130 MM HG: CPT | Mod: CPTII,S$GLB,, | Performed by: OBSTETRICS & GYNECOLOGY

## 2023-02-13 PROCEDURE — 3079F PR MOST RECENT DIASTOLIC BLOOD PRESSURE 80-89 MM HG: ICD-10-PCS | Mod: CPTII,S$GLB,, | Performed by: OBSTETRICS & GYNECOLOGY

## 2023-02-13 PROCEDURE — 99999 PR PBB SHADOW E&M-EST. PATIENT-LVL III: CPT | Mod: PBBFAC,,, | Performed by: OBSTETRICS & GYNECOLOGY

## 2023-02-13 PROCEDURE — 3074F PR MOST RECENT SYSTOLIC BLOOD PRESSURE < 130 MM HG: ICD-10-PCS | Mod: CPTII,S$GLB,, | Performed by: OBSTETRICS & GYNECOLOGY

## 2023-02-13 PROCEDURE — 1159F MED LIST DOCD IN RCRD: CPT | Mod: CPTII,S$GLB,, | Performed by: OBSTETRICS & GYNECOLOGY

## 2023-02-13 PROCEDURE — 3008F BODY MASS INDEX DOCD: CPT | Mod: CPTII,S$GLB,, | Performed by: OBSTETRICS & GYNECOLOGY

## 2023-02-13 PROCEDURE — 99396 PR PREVENTIVE VISIT,EST,40-64: ICD-10-PCS | Mod: S$GLB,,, | Performed by: OBSTETRICS & GYNECOLOGY

## 2023-02-13 PROCEDURE — 1160F PR REVIEW ALL MEDS BY PRESCRIBER/CLIN PHARMACIST DOCUMENTED: ICD-10-PCS | Mod: CPTII,S$GLB,, | Performed by: OBSTETRICS & GYNECOLOGY

## 2023-02-13 PROCEDURE — 1159F PR MEDICATION LIST DOCUMENTED IN MEDICAL RECORD: ICD-10-PCS | Mod: CPTII,S$GLB,, | Performed by: OBSTETRICS & GYNECOLOGY

## 2023-02-13 PROCEDURE — 3079F DIAST BP 80-89 MM HG: CPT | Mod: CPTII,S$GLB,, | Performed by: OBSTETRICS & GYNECOLOGY

## 2023-02-13 PROCEDURE — 99396 PREV VISIT EST AGE 40-64: CPT | Mod: S$GLB,,, | Performed by: OBSTETRICS & GYNECOLOGY

## 2023-02-13 PROCEDURE — 99999 PR PBB SHADOW E&M-EST. PATIENT-LVL III: ICD-10-PCS | Mod: PBBFAC,,, | Performed by: OBSTETRICS & GYNECOLOGY

## 2023-02-13 PROCEDURE — 1160F RVW MEDS BY RX/DR IN RCRD: CPT | Mod: CPTII,S$GLB,, | Performed by: OBSTETRICS & GYNECOLOGY

## 2023-02-13 NOTE — PROGRESS NOTES
PT HERE FOR ANNUAL.    ROS:  GENERAL: No fever, chills, fatigability or weight loss.  VULVAR: No pain, no lesions and no itching.  VAGINAL: No relaxation, no itching, no discharge, no abnormal bleeding and no lesions.  ABDOMEN: No abdominal pain. Denies nausea. Denies vomiting. No diarrhea. No constipation  BREAST: Denies pain. No lumps. No discharge.  URINARY: No incontinence, no nocturia, no frequency and no dysuria.  CARDIOVASCULAR: No chest pain. No shortness of breath. No leg cramps.  NEUROLOGICAL: No headaches. No vision changes.  The remainder of the review of systems was negative.    PE:  General Appearance: obese And Well developed. Well nourished. In no acute distress.  Vulva: Lesions: No.  Urethral Meatus: Normal size. Normal location. No lesions. No prolapse.  Urethra: No masses. No tenderness. No prolapse. No scarring.  Bladder: No masses. No tenderness.  Vagina: Mucosa NI:yes discharge no, atrophy no, cystocele no or rectocele no.  Cervix: Lesion: no  Stenotic: no Cervical motion tenderness: no  Uterus: Uterus size: 6 weeks. Support good. Uterus size: Normal  Adnexa: Masses: No Tenderness: No CDS Nodularity: No  Abdomen: obese No masses. No tenderness.  Breasts: No bilateral masses. No bilateral discharge. No bilateral tenderness. No bilateral fibrocystic changes.  Neck: No thyroid enlargement. No thyroid masses.  Skin: Rashes: No      PROCEDURES:    PLAN:     DIAGNOSIS:  1. Visit for gynecologic examination    2. Screening mammogram for high-risk patient        MEDICATIONS & ORDERS:  Orders Placed This Encounter    Mammo Digital Screening Bilat       Patient was counseled today on the new ACS guidelines for cervical cytology screening as well as the current recommendations for breast cancer screening. She was counseled to follow up with her PCP for other routine health maintenance. Counseling session lasted approximately 10 minutes, and all her questions were answered.         FOLLOW-UP: With me in 12  month    Darell Valladares Jr, MD, FACOG

## 2023-03-24 ENCOUNTER — PATIENT MESSAGE (OUTPATIENT)
Dept: NEUROLOGY | Facility: CLINIC | Age: 43
End: 2023-03-24
Payer: COMMERCIAL

## 2023-04-03 ENCOUNTER — OFFICE VISIT (OUTPATIENT)
Dept: NEUROLOGY | Facility: CLINIC | Age: 43
End: 2023-04-03
Payer: COMMERCIAL

## 2023-04-03 DIAGNOSIS — G44.86 CERVICOGENIC HEADACHE: ICD-10-CM

## 2023-04-03 DIAGNOSIS — M54.81 BILATERAL OCCIPITAL NEURALGIA: ICD-10-CM

## 2023-04-03 DIAGNOSIS — G43.711 CHRONIC MIGRAINE WITHOUT AURA, WITH INTRACTABLE MIGRAINE, SO STATED, WITH STATUS MIGRAINOSUS: Primary | ICD-10-CM

## 2023-04-03 PROCEDURE — 99214 OFFICE O/P EST MOD 30 MIN: CPT | Mod: 95,,, | Performed by: PSYCHIATRY & NEUROLOGY

## 2023-04-03 PROCEDURE — 99214 PR OFFICE/OUTPT VISIT, EST, LEVL IV, 30-39 MIN: ICD-10-PCS | Mod: 95,,, | Performed by: PSYCHIATRY & NEUROLOGY

## 2023-04-03 RX ORDER — BACLOFEN 10 MG/1
10 TABLET ORAL 3 TIMES DAILY PRN
Qty: 90 TABLET | Refills: 3 | Status: SHIPPED | OUTPATIENT
Start: 2023-04-03 | End: 2024-04-02

## 2023-04-03 RX ORDER — GABAPENTIN 300 MG/1
600 CAPSULE ORAL NIGHTLY
Qty: 180 CAPSULE | Refills: 3 | Status: SHIPPED | OUTPATIENT
Start: 2023-04-03

## 2023-04-03 RX ORDER — PROPRANOLOL HYDROCHLORIDE 40 MG/1
40 TABLET ORAL 2 TIMES DAILY
Qty: 180 TABLET | Refills: 3 | Status: SHIPPED | OUTPATIENT
Start: 2023-04-03

## 2023-04-03 RX ORDER — RIMEGEPANT SULFATE 75 MG/75MG
75 TABLET, ORALLY DISINTEGRATING ORAL DAILY PRN
Qty: 8 TABLET | Refills: 12 | Status: SHIPPED | OUTPATIENT
Start: 2023-04-03 | End: 2023-10-12

## 2023-04-03 NOTE — PROGRESS NOTES
The patient location is: Home  The chief complaint leading to consultation is:  Headache  Visit type: Virtual visit with synchronous audio and video  Total time spent with patient: 15  Each patient to whom he or she provides medical services by telemedicine is:  (1) informed of the relationship between the physician and patient and the respective role of any other health care provider with respect to management of the patient; and (2) notified that he or she may decline to receive medical services by telemedicine and may withdraw from such care at any time.    Subjective:       Patient ID: Silke Fulton is a 42 y.o. female.    Interval History:  Silke Fulton is here for follow up. Their condition is clinically stable, improving to having 1-4 days of headache per month.  We have discussed options in terms of treatment of her headache because she states that the Nurtec does not always help, later changing her story saying the Nurtec works if she is patient.  We have discussed that she has multifactorial headaches and that she may just need a muscle relaxer on top of her Nurtec.  She asked about Robaxin, we have discussed that Robaxin stopped working for her so I have suggested a different option for her.      Objective:   Patient is awake, alert and oriented to person, place and time. Moves all extremities antigravity. Cranial Nerves 2-12 are without gross focal deficit.     Focused telemedicine examination was undertaken today. Over 50% of face to face time of 15 minute visit time was in giving guidance, counseling and discussing treatment options.      Assessment/Plan:     Problem List Items Addressed This Visit          Neuro    Chronic migraine without aura, with intractable migraine, so stated, with status migrainosus - Primary    Overview     Preventive - gabapentin QHS, propranolol BID  Rescue - Nurtec (has failed Imitrex, Maxalt, Amerge, Naproxen, Ketorolac)  Triggers - weather, humidity,  stress, hormones    1-4 days a month            Relevant Medications    NURTEC 75 mg odt    gabapentin (NEURONTIN) 300 MG capsule    propranoloL (INDERAL) 40 MG tablet    Cervicogenic headache    Relevant Medications    baclofen (LIORESAL) 10 MG tablet       Orthopedic    Occipital neuralgia    Relevant Medications    baclofen (LIORESAL) 10 MG tablet    gabapentin (NEURONTIN) 300 MG capsule     42-year-old female presents for evaluation and follow-up of complex multifactorial headaches.  At this time I will have her continue gabapentin 600 mg p.o. q.h.s., propanolol 40 mg p.o. b.i.d. as preventive for her headaches.  We have discussed Nurtec as a rescue, being medically necessary that she has previously tried and failed Imitrex and Maxalt and notes that her Nurtec is more helpful to stop her headaches as a rescue.  We have discussed a judicious use of baclofen as needed up to t.i.d. for the cervicogenic component of her headache.  She notes that her  states that she forgets things and I have offered to wean the dose or reduce the dose of either her propanolol or her gabapentin, but the patient notes that she likes them and she will continue these medications for now.      The patient verbalizes understanding and agreement with the treatment plan. I have discussed risks, benefits and alternatives to the treatment plan. Questions were sought and answered to her stated verbal satisfaction.      August Luther MD, FAAN    This note is dictated on M*Modal Fluency Direct word recognition program. There are word recognition mistakes that are occasionally missed on review.

## 2023-04-10 ENCOUNTER — HOSPITAL ENCOUNTER (OUTPATIENT)
Dept: RADIOLOGY | Facility: HOSPITAL | Age: 43
Discharge: HOME | End: 2023-04-10
Attending: OBSTETRICS & GYNECOLOGY
Payer: COMMERCIAL

## 2023-04-10 DIAGNOSIS — Z12.31 SCREENING MAMMOGRAM FOR HIGH-RISK PATIENT: ICD-10-CM

## 2023-04-10 PROCEDURE — 77067 SCR MAMMO BI INCL CAD: CPT | Mod: TC

## 2023-04-10 PROCEDURE — 77063 BREAST TOMOSYNTHESIS BI: CPT | Mod: 26,,, | Performed by: RADIOLOGY

## 2023-04-10 PROCEDURE — 77067 SCR MAMMO BI INCL CAD: CPT | Mod: 26,,, | Performed by: RADIOLOGY

## 2023-04-10 PROCEDURE — 77067 MAMMO DIGITAL SCREENING BILAT WITH TOMO: ICD-10-PCS | Mod: 26,,, | Performed by: RADIOLOGY

## 2023-04-10 PROCEDURE — 77063 MAMMO DIGITAL SCREENING BILAT WITH TOMO: ICD-10-PCS | Mod: 26,,, | Performed by: RADIOLOGY

## 2023-06-11 ENCOUNTER — PATIENT MESSAGE (OUTPATIENT)
Dept: INTERNAL MEDICINE | Facility: CLINIC | Age: 43
End: 2023-06-11
Payer: COMMERCIAL

## 2023-06-12 NOTE — TELEPHONE ENCOUNTER
If she has Bactroban rec she use it first  Keep the wound clean  And be cautious of the [place]- lots of bacteria  If it gets worse rec UC evaluation

## 2023-07-07 ENCOUNTER — PATIENT MESSAGE (OUTPATIENT)
Dept: NEUROLOGY | Facility: CLINIC | Age: 43
End: 2023-07-07
Payer: COMMERCIAL

## 2023-10-12 DIAGNOSIS — G43.711 CHRONIC MIGRAINE WITHOUT AURA, WITH INTRACTABLE MIGRAINE, SO STATED, WITH STATUS MIGRAINOSUS: ICD-10-CM

## 2023-10-12 RX ORDER — RIMEGEPANT SULFATE 75 MG/75MG
75 TABLET, ORALLY DISINTEGRATING ORAL DAILY PRN
Qty: 8 TABLET | Refills: 12 | Status: SHIPPED | OUTPATIENT
Start: 2023-10-12

## 2023-11-24 ENCOUNTER — OFFICE VISIT (OUTPATIENT)
Dept: INTERNAL MEDICINE | Facility: CLINIC | Age: 43
End: 2023-11-24
Payer: COMMERCIAL

## 2023-11-24 VITALS
WEIGHT: 200.38 LBS | DIASTOLIC BLOOD PRESSURE: 82 MMHG | SYSTOLIC BLOOD PRESSURE: 108 MMHG | HEIGHT: 62 IN | RESPIRATION RATE: 12 BRPM | OXYGEN SATURATION: 98 % | BODY MASS INDEX: 36.87 KG/M2 | HEART RATE: 76 BPM | TEMPERATURE: 99 F

## 2023-11-24 DIAGNOSIS — M79.671 RIGHT FOOT PAIN: ICD-10-CM

## 2023-11-24 DIAGNOSIS — S90.851A: Primary | ICD-10-CM

## 2023-11-24 DIAGNOSIS — L08.9: Primary | ICD-10-CM

## 2023-11-24 PROCEDURE — 99999 PR PBB SHADOW E&M-EST. PATIENT-LVL IV: ICD-10-PCS | Mod: PBBFAC,,, | Performed by: NURSE PRACTITIONER

## 2023-11-24 PROCEDURE — 99999 PR PBB SHADOW E&M-EST. PATIENT-LVL IV: CPT | Mod: PBBFAC,,, | Performed by: NURSE PRACTITIONER

## 2023-11-24 PROCEDURE — 3074F PR MOST RECENT SYSTOLIC BLOOD PRESSURE < 130 MM HG: ICD-10-PCS | Mod: CPTII,S$GLB,, | Performed by: NURSE PRACTITIONER

## 2023-11-24 PROCEDURE — 3074F SYST BP LT 130 MM HG: CPT | Mod: CPTII,S$GLB,, | Performed by: NURSE PRACTITIONER

## 2023-11-24 PROCEDURE — 1159F PR MEDICATION LIST DOCUMENTED IN MEDICAL RECORD: ICD-10-PCS | Mod: CPTII,S$GLB,, | Performed by: NURSE PRACTITIONER

## 2023-11-24 PROCEDURE — 3008F PR BODY MASS INDEX (BMI) DOCUMENTED: ICD-10-PCS | Mod: CPTII,S$GLB,, | Performed by: NURSE PRACTITIONER

## 2023-11-24 PROCEDURE — 1160F PR REVIEW ALL MEDS BY PRESCRIBER/CLIN PHARMACIST DOCUMENTED: ICD-10-PCS | Mod: CPTII,S$GLB,, | Performed by: NURSE PRACTITIONER

## 2023-11-24 PROCEDURE — 1160F RVW MEDS BY RX/DR IN RCRD: CPT | Mod: CPTII,S$GLB,, | Performed by: NURSE PRACTITIONER

## 2023-11-24 PROCEDURE — 3079F DIAST BP 80-89 MM HG: CPT | Mod: CPTII,S$GLB,, | Performed by: NURSE PRACTITIONER

## 2023-11-24 PROCEDURE — 99213 PR OFFICE/OUTPT VISIT, EST, LEVL III, 20-29 MIN: ICD-10-PCS | Mod: S$GLB,,, | Performed by: NURSE PRACTITIONER

## 2023-11-24 PROCEDURE — 99213 OFFICE O/P EST LOW 20 MIN: CPT | Mod: S$GLB,,, | Performed by: NURSE PRACTITIONER

## 2023-11-24 PROCEDURE — 3008F BODY MASS INDEX DOCD: CPT | Mod: CPTII,S$GLB,, | Performed by: NURSE PRACTITIONER

## 2023-11-24 PROCEDURE — 3079F PR MOST RECENT DIASTOLIC BLOOD PRESSURE 80-89 MM HG: ICD-10-PCS | Mod: CPTII,S$GLB,, | Performed by: NURSE PRACTITIONER

## 2023-11-24 PROCEDURE — 1159F MED LIST DOCD IN RCRD: CPT | Mod: CPTII,S$GLB,, | Performed by: NURSE PRACTITIONER

## 2023-11-24 RX ORDER — CEPHALEXIN 500 MG/1
500 CAPSULE ORAL EVERY 6 HOURS
Qty: 28 CAPSULE | Refills: 0 | Status: SHIPPED | OUTPATIENT
Start: 2023-11-24 | End: 2023-12-01

## 2023-11-24 NOTE — PROGRESS NOTES
Subjective:       Patient ID: Silke Fulton is a 43 y.o. female.    Chief Complaint: Foot Injury (Right foot pain--lateral side; pt stepped on something. Pt;s mother has been putting Epson salt, muriporocin; and Smile's PRID Drawing Salve ) and thumb pain (Swelling of left thumb )    Patient is a 43 y.o. female who traditionally follows with No, Primary Doctor presenting today for:    Foot Pain  Patient notes foot pain since Monday. Feels she may have stepped on glass. The area was painful and throbbing. She tried epsom salt baths and probing the area to remove the object without success. She has also tried drawing salve with no relief. None of her actions were helpful - continues to have pain to the area.     Review of patient's allergies indicates:   Allergen Reactions    Septra [sulfamethoxazole-trimethoprim] Other (See Comments)    Sulfa (sulfonamide antibiotics)      Bone marrow suppression         Medication List with Changes/Refills   New Medications    CEPHALEXIN (KEFLEX) 500 MG CAPSULE    Take 1 capsule (500 mg total) by mouth every 6 (six) hours. for 7 days   Current Medications    BACLOFEN (LIORESAL) 10 MG TABLET    Take 1 tablet (10 mg total) by mouth 3 (three) times daily as needed (neck muscle spasm).    CETIRIZINE (ZYRTEC) 10 MG TABLET    Take 10 mg by mouth once daily.    EC-NAPROXEN 500 MG EC TABLET    TAKE 1 TABLET (500 MG TOTAL) BY MOUTH 2 (TWO) TIMES DAILY AS NEEDED (HEADACHE).    GABAPENTIN (NEURONTIN) 300 MG CAPSULE    Take 2 capsules (600 mg total) by mouth every evening.    MAGNESIUM 250 MG TAB    Take 500 mg by mouth once daily.     MULTIVITAMIN (THERAGRAN) PER TABLET    Take 1 tablet by mouth once daily.    NURTEC 75 MG ODT    TAKE 1 TABLET (75 MG TOTAL) BY MOUTH DAILY AS NEEDED FOR MIGRAINE.    POLYCARBOPHIL (FIBERCON) 625 MG TABLET    Take 625 mg by mouth once daily.    PROPRANOLOL (INDERAL) 40 MG TABLET    Take 1 tablet (40 mg total) by mouth 2 (two) times daily.     Medical,  "social and surgical history has been reviewed with the patient.     Review of Systems   Musculoskeletal:         Foot Pain   Integumentary:  Positive for wound.      Objective:   /82 (BP Location: Right arm, Patient Position: Sitting, BP Method: Medium (Manual))   Pulse 76   Temp 98.5 °F (36.9 °C) (Temporal)   Resp 12   Ht 5' 2" (1.575 m)   Wt 90.9 kg (200 lb 6.4 oz)   LMP 10/19/2023 (Exact Date)   SpO2 98%   BMI 36.65 kg/m²       Physical Exam  Vitals reviewed.   Constitutional:       Appearance: Normal appearance.   HENT:      Head: Normocephalic and atraumatic.   Pulmonary:      Effort: Pulmonary effort is normal.   Musculoskeletal:        Feet:    Skin:     General: Skin is dry.      Comments: Pinpoint lesion to left thumb without underlying tissue changes to suggest infection. No erythema, warmth or drainage.    Neurological:      Mental Status: She is alert and oriented to person, place, and time.       I reviewed and independently interpreted the labs and imaging below:  Last Labs:  BUN   Date Value Ref Range Status   04/02/2018 15 6 - 20 mg/dL Final   12/01/2015 14 6 - 20 mg/dL Final     Creatinine   Date Value Ref Range Status   04/02/2018 0.8 0.5 - 1.4 mg/dL Final   12/01/2015 0.8 0.5 - 1.4 mg/dL Final     Assessment and Plan:     1. Splinter of foot without major open wound with infection, right, initial encounter  2. Right foot pain    Patient advised that I cannot debride feet in an attempt to remove objects. Will refer to podiatry. Given she has manipulated the area already and there is slight warmth will treat empirically with ABX to prevent infection prior to appointment with Podiatry.     - cephALEXin (KEFLEX) 500 MG capsule; Take 1 capsule (500 mg total) by mouth every 6 (six) hours. for 7 days  Dispense: 28 capsule; Refill: 0  - Ambulatory referral/consult to Podiatry; Future      "

## 2023-11-29 DIAGNOSIS — M79.671 RIGHT FOOT PAIN: Primary | ICD-10-CM

## 2023-11-30 ENCOUNTER — HOSPITAL ENCOUNTER (OUTPATIENT)
Dept: RADIOLOGY | Facility: OTHER | Age: 43
Discharge: HOME | End: 2023-11-30
Attending: PODIATRIST
Payer: COMMERCIAL

## 2023-11-30 DIAGNOSIS — M79.671 RIGHT FOOT PAIN: ICD-10-CM

## 2023-11-30 PROCEDURE — 73630 X-RAY EXAM OF FOOT: CPT | Mod: TC,FY,RT

## 2023-11-30 PROCEDURE — 73630 XR FOOT COMPLETE 3 VIEW RIGHT: ICD-10-PCS | Mod: 26,RT,, | Performed by: STUDENT IN AN ORGANIZED HEALTH CARE EDUCATION/TRAINING PROGRAM

## 2023-11-30 PROCEDURE — 73630 X-RAY EXAM OF FOOT: CPT | Mod: 26,RT,, | Performed by: STUDENT IN AN ORGANIZED HEALTH CARE EDUCATION/TRAINING PROGRAM

## 2023-12-05 ENCOUNTER — OFFICE VISIT (OUTPATIENT)
Dept: OPTOMETRY | Facility: CLINIC | Age: 43
End: 2023-12-05
Payer: COMMERCIAL

## 2023-12-05 DIAGNOSIS — H49.12 FOURTH NERVE PALSY OF LEFT EYE: ICD-10-CM

## 2023-12-05 DIAGNOSIS — H04.123 DRY EYE SYNDROME, BILATERAL: ICD-10-CM

## 2023-12-05 DIAGNOSIS — H52.203 ASTIGMATISM OF BOTH EYES, UNSPECIFIED TYPE: Primary | ICD-10-CM

## 2023-12-05 DIAGNOSIS — Z98.890 HISTORY OF STRABISMUS SURGERY: ICD-10-CM

## 2023-12-05 DIAGNOSIS — Z85.841 PERSONAL HISTORY OF MALIGNANT NEOPLASM OF BRAIN: ICD-10-CM

## 2023-12-05 DIAGNOSIS — H16.229 KERATOCONJUNCT SICCA, NOT SPECIFIED AS SJOGREN'S, UNSP EYE: ICD-10-CM

## 2023-12-05 PROCEDURE — 99999 PR PBB SHADOW E&M-EST. PATIENT-LVL II: ICD-10-PCS | Mod: PBBFAC,,, | Performed by: OPTOMETRIST

## 2023-12-05 PROCEDURE — 92014 PR EYE EXAM, EST PATIENT,COMPREHESV: ICD-10-PCS | Mod: S$GLB,,, | Performed by: OPTOMETRIST

## 2023-12-05 PROCEDURE — 92015 PR REFRACTION: ICD-10-PCS | Mod: S$GLB,,, | Performed by: OPTOMETRIST

## 2023-12-05 PROCEDURE — 92014 COMPRE OPH EXAM EST PT 1/>: CPT | Mod: S$GLB,,, | Performed by: OPTOMETRIST

## 2023-12-05 PROCEDURE — 99999 PR PBB SHADOW E&M-EST. PATIENT-LVL II: CPT | Mod: PBBFAC,,, | Performed by: OPTOMETRIST

## 2023-12-05 PROCEDURE — 92015 DETERMINE REFRACTIVE STATE: CPT | Mod: S$GLB,,, | Performed by: OPTOMETRIST

## 2023-12-05 RX ORDER — CYCLOSPORINE 0.5 MG/ML
1 EMULSION OPHTHALMIC 2 TIMES DAILY
Qty: 180 EACH | Refills: 3 | Status: SHIPPED | OUTPATIENT
Start: 2023-12-05 | End: 2023-12-06

## 2023-12-05 RX ORDER — FLUOROMETHOLONE 1 MG/ML
1 SUSPENSION/ DROPS OPHTHALMIC 2 TIMES DAILY
Qty: 5 ML | Refills: 0 | Status: SHIPPED | OUTPATIENT
Start: 2023-12-05 | End: 2023-12-19

## 2023-12-06 RX ORDER — CYCLOSPORINE 0.5 MG/ML
1 EMULSION OPHTHALMIC 2 TIMES DAILY
Refills: 3 | OUTPATIENT
Start: 2023-12-06

## 2023-12-07 NOTE — PROGRESS NOTES
[facility]    WOO: 4/28/2022 - Dr. Cruz    CC: Pt is here today for a routine eye exam. Pt states that vision has   been stable since last exam.     (-)Dryness  (+)Burning  (-)Itchiness  (-)Tearing  (-)Flashes  (-)Floaters   (+)Photophobia  (-)Eye Pain      Diabetic: no    Contact Lens: no    Eye Meds: Refresh Gel Drops QHS    Last edited by Tala Cárdenas MA on 12/5/2023  3:55 PM.            Assessment /Plan     For exam results, see Encounter Report.      Astigmatism of both eyes, unspecified type              Rx specs, continue with vertical prism, wear specs full time,  may need BF next visit                Personal history of malignant neoplasm of brain     Fourth nerve palsy of left eye  History of strabismus surgery  Pt reports surgery when she was 11 years old with Dr. Momo Lamb internal ocular [facility] today  Personal history of malignant neoplasm of brain    Dry eye syndrome, bilateral  Keratoconjunct sicca, not specified as Sjogren's, unsp eye  -    Start-     fluorometholone 0.1% (FML) 0.1 % DrpS; Place 1 drop into both eyes 2 (two) times daily. for 14 days  Dispense: 5 mL; Refill: 0  - then switch to    lifitegrast (XIIDRA) 5 % Dpet; Apply 1 drop to eye every 12 (twelve) hours.  Dispense: 180 each; Refill: 4    RTC 1 year, sooner PRN

## 2023-12-31 ENCOUNTER — NURSE TRIAGE (OUTPATIENT)
Dept: ADMINISTRATIVE | Facility: CLINIC | Age: 43
End: 2023-12-31
Payer: COMMERCIAL

## 2024-01-01 ENCOUNTER — PATIENT MESSAGE (OUTPATIENT)
Dept: INTERNAL MEDICINE | Facility: CLINIC | Age: 44
End: 2024-01-01
Payer: COMMERCIAL

## 2024-01-01 NOTE — TELEPHONE ENCOUNTER
Reason for Disposition   Numbness in groin or rectal area (i.e., loss of sensation)    Additional Information   Negative: Passed out (i.e., lost consciousness, collapsed and was not responding)   Negative: Shock suspected (e.g., cold/pale/clammy skin, too weak to stand, low BP, rapid pulse)   Negative: Sounds like a life-threatening emergency to the triager   Negative: [1] SEVERE back pain (e.g., excruciating) AND [2] sudden onset AND [3] age > 60 years   Negative: [1] Unable to urinate (or only a few drops) > 4 hours AND [2] bladder feels very full (e.g., palpable bladder or strong urge to urinate)   Negative: [1] Loss of bladder or bowel control (urine or bowel incontinence; wetting self, leaking stool) AND [2] new-onset    Protocols used: Back Pain-A-AH  pt called re cant see dr faulkner. Pt c/o low back hurts x 1 -2 weeks. one leg is numb since thurs, urinating blood x 1-2 wks. Afeb. denies injury , rating pain = 8. Pain constant all evening.rec ED now. Pt agrees and states she has a ride.

## 2024-01-02 ENCOUNTER — HOSPITAL ENCOUNTER (OUTPATIENT)
Dept: RADIOLOGY | Facility: OTHER | Age: 44
Discharge: HOME | End: 2024-01-02
Attending: OBSTETRICS & GYNECOLOGY
Payer: COMMERCIAL

## 2024-01-02 ENCOUNTER — TELEPHONE (OUTPATIENT)
Dept: OBSTETRICS AND GYNECOLOGY | Facility: CLINIC | Age: 44
End: 2024-01-02

## 2024-01-02 ENCOUNTER — OFFICE VISIT (OUTPATIENT)
Dept: OBSTETRICS AND GYNECOLOGY | Facility: CLINIC | Age: 44
End: 2024-01-02
Attending: OBSTETRICS & GYNECOLOGY
Payer: COMMERCIAL

## 2024-01-02 ENCOUNTER — OFFICE VISIT (OUTPATIENT)
Dept: INTERNAL MEDICINE | Facility: CLINIC | Age: 44
End: 2024-01-02
Payer: COMMERCIAL

## 2024-01-02 ENCOUNTER — HOSPITAL ENCOUNTER (OUTPATIENT)
Dept: RADIOLOGY | Facility: OTHER | Age: 44
Discharge: HOME | End: 2024-01-02
Attending: STUDENT IN AN ORGANIZED HEALTH CARE EDUCATION/TRAINING PROGRAM
Payer: COMMERCIAL

## 2024-01-02 ENCOUNTER — PATIENT MESSAGE (OUTPATIENT)
Dept: INTERNAL MEDICINE | Facility: CLINIC | Age: 44
End: 2024-01-02
Payer: COMMERCIAL

## 2024-01-02 VITALS
HEIGHT: 62 IN | BODY MASS INDEX: 36.71 KG/M2 | DIASTOLIC BLOOD PRESSURE: 82 MMHG | WEIGHT: 199.5 LBS | SYSTOLIC BLOOD PRESSURE: 126 MMHG

## 2024-01-02 VITALS
WEIGHT: 194.25 LBS | DIASTOLIC BLOOD PRESSURE: 72 MMHG | BODY MASS INDEX: 35.75 KG/M2 | SYSTOLIC BLOOD PRESSURE: 138 MMHG | OXYGEN SATURATION: 97 % | HEART RATE: 87 BPM | HEIGHT: 62 IN

## 2024-01-02 DIAGNOSIS — N93.9 ABNORMAL UTERINE BLEEDING (AUB): ICD-10-CM

## 2024-01-02 DIAGNOSIS — M54.41 ACUTE RIGHT-SIDED LOW BACK PAIN WITH RIGHT-SIDED SCIATICA: Primary | ICD-10-CM

## 2024-01-02 DIAGNOSIS — M54.41 ACUTE RIGHT-SIDED LOW BACK PAIN WITH RIGHT-SIDED SCIATICA: ICD-10-CM

## 2024-01-02 DIAGNOSIS — N95.1 PERIMENOPAUSAL: ICD-10-CM

## 2024-01-02 DIAGNOSIS — N93.9 ABNORMAL UTERINE BLEEDING (AUB): Primary | ICD-10-CM

## 2024-01-02 DIAGNOSIS — M54.31 SCIATICA OF RIGHT SIDE: ICD-10-CM

## 2024-01-02 LAB
B-HCG UR QL: NEGATIVE
BACTERIA #/AREA URNS AUTO: ABNORMAL /HPF
BILIRUB UR QL STRIP: NEGATIVE
CLARITY UR REFRACT.AUTO: CLEAR
COLOR UR AUTO: COLORLESS
CTP QC/QA: YES
GLUCOSE UR QL STRIP: NEGATIVE
HGB UR QL STRIP: ABNORMAL
KETONES UR QL STRIP: NEGATIVE
LEUKOCYTE ESTERASE UR QL STRIP: NEGATIVE
MICROSCOPIC COMMENT: ABNORMAL
NITRITE UR QL STRIP: NEGATIVE
PH UR STRIP: 6 [PH] (ref 5–8)
PROT UR QL STRIP: NEGATIVE
RBC #/AREA URNS AUTO: 17 /HPF (ref 0–4)
SP GR UR STRIP: 1.01 (ref 1–1.03)
SQUAMOUS #/AREA URNS AUTO: 1 /HPF
URN SPEC COLLECT METH UR: ABNORMAL
WBC #/AREA URNS AUTO: 1 /HPF (ref 0–5)

## 2024-01-02 PROCEDURE — 81025 URINE PREGNANCY TEST: CPT | Mod: S$GLB,,, | Performed by: OBSTETRICS & GYNECOLOGY

## 2024-01-02 PROCEDURE — 3079F DIAST BP 80-89 MM HG: CPT | Mod: CPTII,S$GLB,, | Performed by: OBSTETRICS & GYNECOLOGY

## 2024-01-02 PROCEDURE — 1160F RVW MEDS BY RX/DR IN RCRD: CPT | Mod: CPTII,S$GLB,, | Performed by: OBSTETRICS & GYNECOLOGY

## 2024-01-02 PROCEDURE — 76856 US EXAM PELVIC COMPLETE: CPT | Mod: 26,,, | Performed by: RADIOLOGY

## 2024-01-02 PROCEDURE — 72110 X-RAY EXAM L-2 SPINE 4/>VWS: CPT | Mod: 26,,, | Performed by: RADIOLOGY

## 2024-01-02 PROCEDURE — 3008F BODY MASS INDEX DOCD: CPT | Mod: CPTII,S$GLB,, | Performed by: OBSTETRICS & GYNECOLOGY

## 2024-01-02 PROCEDURE — 76830 TRANSVAGINAL US NON-OB: CPT | Mod: 26,,, | Performed by: RADIOLOGY

## 2024-01-02 PROCEDURE — 3078F DIAST BP <80 MM HG: CPT | Mod: CPTII,S$GLB,, | Performed by: STUDENT IN AN ORGANIZED HEALTH CARE EDUCATION/TRAINING PROGRAM

## 2024-01-02 PROCEDURE — 3008F BODY MASS INDEX DOCD: CPT | Mod: CPTII,S$GLB,, | Performed by: STUDENT IN AN ORGANIZED HEALTH CARE EDUCATION/TRAINING PROGRAM

## 2024-01-02 PROCEDURE — 81001 URINALYSIS AUTO W/SCOPE: CPT | Performed by: STUDENT IN AN ORGANIZED HEALTH CARE EDUCATION/TRAINING PROGRAM

## 2024-01-02 PROCEDURE — 99213 OFFICE O/P EST LOW 20 MIN: CPT | Mod: S$GLB,,, | Performed by: STUDENT IN AN ORGANIZED HEALTH CARE EDUCATION/TRAINING PROGRAM

## 2024-01-02 PROCEDURE — 99214 OFFICE O/P EST MOD 30 MIN: CPT | Mod: S$GLB,,, | Performed by: OBSTETRICS & GYNECOLOGY

## 2024-01-02 PROCEDURE — 1159F MED LIST DOCD IN RCRD: CPT | Mod: CPTII,S$GLB,, | Performed by: OBSTETRICS & GYNECOLOGY

## 2024-01-02 PROCEDURE — 3075F SYST BP GE 130 - 139MM HG: CPT | Mod: CPTII,S$GLB,, | Performed by: STUDENT IN AN ORGANIZED HEALTH CARE EDUCATION/TRAINING PROGRAM

## 2024-01-02 PROCEDURE — 99999 PR PBB SHADOW E&M-EST. PATIENT-LVL III: CPT | Mod: PBBFAC,,, | Performed by: OBSTETRICS & GYNECOLOGY

## 2024-01-02 PROCEDURE — 99999 PR PBB SHADOW E&M-EST. PATIENT-LVL III: CPT | Mod: PBBFAC,,, | Performed by: STUDENT IN AN ORGANIZED HEALTH CARE EDUCATION/TRAINING PROGRAM

## 2024-01-02 PROCEDURE — 72110 X-RAY EXAM L-2 SPINE 4/>VWS: CPT | Mod: TC,FY

## 2024-01-02 PROCEDURE — 76830 TRANSVAGINAL US NON-OB: CPT | Mod: TC

## 2024-01-02 PROCEDURE — 3074F SYST BP LT 130 MM HG: CPT | Mod: CPTII,S$GLB,, | Performed by: OBSTETRICS & GYNECOLOGY

## 2024-01-02 PROCEDURE — 87491 CHLMYD TRACH DNA AMP PROBE: CPT | Performed by: OBSTETRICS & GYNECOLOGY

## 2024-01-02 RX ORDER — MEDROXYPROGESTERONE ACETATE 10 MG/1
TABLET ORAL
Qty: 30 TABLET | Refills: 0 | Status: SHIPPED | OUTPATIENT
Start: 2024-01-02 | End: 2024-02-01 | Stop reason: SDUPTHER

## 2024-01-02 RX ORDER — MELOXICAM 7.5 MG/1
7.5 TABLET ORAL DAILY
Qty: 15 TABLET | Refills: 0 | Status: SHIPPED | OUTPATIENT
Start: 2024-01-02 | End: 2024-01-12

## 2024-01-02 NOTE — TELEPHONE ENCOUNTER
Called pt regarding Blood in Urine. Pt stated she has an a appointment on today at [facility] with GYN due to blood in urine. Cancel appointment on 1/8/24 with Silke.

## 2024-01-02 NOTE — PROGRESS NOTES
"Chief Complaint   Patient presents with    Vaginal Bleeding       HPI:  Silke Fulton is a 43 y.o. female patient  who presents today for evaluation of abnormal uterine bleeding.  Previously, periods were generally occurring monthly, lasting 6 days in duration, without intermenstrual bleeding.  She describes skipping her period 2023 as well as 2023.  She then began bleeding 2023 and has been experiencing continued bleeding for the past 3 weeks (except for 3 days).  On some days, her bleeding is heavy with clots, while on other days light.  Denies pelvic pain but reports low back discomfort that radiates down into her leg.  No fever.  Previously, seeing Dr. Byers and Dr. Valladares.      Patient's last menstrual period was 2023 (approximate).    Blood pressure 126/82, height 5' 2" (1.575 m), weight 90.5 kg (199 lb 8.3 oz), last menstrual period 2023.    UPT today: Negative    2/3/21 Pap: Negative, HPV: Negative    4/10/23 Mammogram: Negative, TC: 17.56%    Past Medical History:   Diagnosis Date    Acute gastritis without mention of hemorrhage     Esophageal reflux     Meningitis     staph aureus    Migraine without aura, without mention of intractable migraine without mention of status migrainosus     Personal history of malignant neoplasm of brain     cerebellar astrocytoma    Tarsal tunnel syndrome        Past Surgical History:   Procedure Laterality Date    cerebellar astrocytoma      eye-superior oblique palsy      right mastoidectomy           ROS:  GENERAL: Feeling well overall.   SKIN: Denies rash or lesions.   HEAD: Denies head injury or headache.   NODES: Denies enlarged lymph nodes.   CHEST: Denies chest pain or shortness of breath.   CARDIOVASCULAR: Denies palpitations or left sided chest pain.   ABDOMEN: No abdominal pain, nausea, vomiting or rectal bleeding.   URINARY: No dysuria or hematuria.  REPRODUCTIVE: See HPI.   BREASTS: Denies pain, lumps, or nipple discharge. "   HEMATOLOGIC: Reports bleeding for the past 3 weeks.  MUSCULOSKELETAL: Reports low back pain.   NEUROLOGIC: Reports pain radiating into her leg.   PSYCHIATRIC: Denies depression.    PE:   (chaperone present during entire exam)  APPEARANCE: Well nourished, well developed, in no acute distress.  ABDOMEN: Soft. No tenderness or masses. No CVA tenderness.  VULVA: No lesions. Normal female genitalia.  URETHRAL MEATUS: Normal size and location, no lesions, no prolapse.  URETHRA: No masses, tenderness, prolapse or scarring.  VAGINA: Moist and well rugated, no abnormal discharge, no significant cystocele or rectocele.  CERVIX: No lesions and discharge.   UTERUS: Normal size, regular shape, mobile, non-tender, bladder base nontender.  ADNEXA: No masses, tenderness or CDS nodularity.  ANUS PERINEUM: Normal.    Diagnosis:  1. Abnormal uterine bleeding (AUB)    2. Perimenopausal          PLAN:    Orders Placed This Encounter    C. trachomatis/N. gonorrhoeae by AMP DNA [facility]; Cervix    US Pelvis Comp with Transvag NON-OB (xpd    CBC Auto Differential    Follicle Stimulating Hormone    Luteinizing Hormone    Estradiol    TSH    POCT Urine Pregnancy    medroxyPROGESTERone (PROVERA) 10 MG tablet       Patient was counseled today on her We discussed hypermenorrhea and the various etiologies.  UPT today was negative.  GC/CT was performed to exclude an infectious etiology.  She will have pelvic ultrasound for evaluation of her uterus as well as labs for hormonal status.  We discussed the possible need for EMBX, based upon sono results.  She will begin Provera to try to acutely stop her bleeding.        Follow-up after testing    I spent a total of 30 minutes on the day of the visit.This includes face to face time and non-face to face time preparing to see the patient (eg, review of tests), Obtaining and/or reviewing separately obtained history, Documenting clinical information in the electronic or other health record, Independently  interpreting resultsand communicating results to the patient/family/caregiver, or Care coordination.    This note was created with voice recognition software.  Grammatical, syntax and spelling errors may be inevitable.

## 2024-01-02 NOTE — PROGRESS NOTES
" [facility]    Subjective:     Patient ID: Silke Fulton is a 43 y.o. female.  Chief Complaint: Back Pain (x 1 month. Right hip and leg "are numb" Possibly r/t to collision with pets in June of 2023. Used glucosamine gel & Flexeril. Aleve prn. )    HPI:  Presents for back pain. Started about 2 weeks ago. It is a deep aching pain felt near her lower spine associated with a shooting pain down the back and side her her leg. It has been about the same in severity for the past two weeks. Pain has caused trouble walking and getting up out of a chair. Never happened before. Has been taking aleve, flexeril and glucosamine and alive gel. They have not helped much.  Her dog (a labrador retriever) ran into her right leg about 6 weeks ago.       Current Outpatient Medications   Medication Instructions    baclofen (LIORESAL) 10 mg, Oral, 3 times daily PRN    cetirizine (ZYRTEC) 10 mg, Oral, Daily    gabapentin (NEURONTIN) 600 mg, Oral, Nightly    lifitegrast (XIIDRA) 5 % Dpet 1 drop, Ophthalmic, Every 12 hours    magnesium 500 mg, Oral, Daily    medroxyPROGESTERone (PROVERA) 10 MG tablet Take one tablet twice daily until bleeding stops, then daily    meloxicam (MOBIC) 7.5 mg, Oral, Daily    multivitamin (THERAGRAN) per tablet 1 tablet, Oral, Daily,      NURTEC 75 mg, Oral, Daily PRN    polycarbophil (FIBERCON) 625 mg, Oral, Daily    propranoloL (INDERAL) 40 mg, Oral, 2 times daily       Objective:        Body mass index is 35.52 kg/m².  Vitals:    01/02/24 1155   BP: 138/72   Pulse: 87   SpO2: 97%   Weight: 88.1 kg (194 lb 3.6 oz)   Height: 5' 2" (1.575 m)     Physical Exam  Constitutional:       Appearance: Normal appearance.   Cardiovascular:      Rate and Rhythm: Normal rate.      Heart sounds: No murmur heard.  Pulmonary:      Effort: Pulmonary effort is normal. No respiratory distress.   Musculoskeletal:      Cervical back: Normal.      Lumbar back: Normal. No swelling, edema, deformity, " spasms, tenderness or bony tenderness.      Comments: Left straight leg raise elicits pain in right back   Neurological:      Mental Status: She is alert.           Assessment:         1. Acute right-sided low back pain with right-sided sciatica          Plan:   Presents with acute low back pain and right-sided radicular symptoms consistent with sciatica.  Given history of trauma (dog running into her) we will get plain films to assess structure and alignment of lumbar spine.  Treat with course of NSAIDs.  Refer to physical therapy.  Patient also brought in urine sample in his requesting UA.  Does not have dysuria but given lower back pain reasonable to check UA.  Has follow up to establish care with new PCP early next month.  CMP checked to ensure normal renal function.  Follow up with me or new PCP p.r.n. worsening.    Acute right-sided low back pain with right-sided sciatica  -     COMPREHENSIVE METABOLIC PANEL; Future; Expected date: 01/02/2024  -     Urinalysis, Reflex to Urine Culture Urine, Clean Catch  -     Ambulatory referral/consult to Physical/Occupational Therapy; Future; Expected date: 01/09/2024  -     meloxicam (MOBIC) 7.5 MG tablet; Take 1 tablet (7.5 mg total) by mouth once daily. for 10 days  Dispense: 15 tablet; Refill: 0  -     X-Ray Lumbar Spine Ap Lateral w/Flex Ext; Future; Expected date: 01/02/2024        No follow-ups on file. or sooner prn (as needed)        Corey Paz  [facility]  [address] [address]  Phone [phone]  Fax [phone]      This note is dictated using the M*Modal Fluency Direct word recognition program. It may contain word recognition mistakes or wrong word substitutions that were missed on review.

## 2024-01-02 NOTE — TELEPHONE ENCOUNTER
Called patient:    Aware of normal H/H and TSH.    We will contact her when the rest of her testing returns.

## 2024-01-03 ENCOUNTER — TELEPHONE (OUTPATIENT)
Dept: OBSTETRICS AND GYNECOLOGY | Facility: CLINIC | Age: 44
End: 2024-01-03
Payer: COMMERCIAL

## 2024-01-04 NOTE — TELEPHONE ENCOUNTER
Called patient:    Discussed results of  labs / sono:    With Provera bid, reports that her bleeding is lighter.    FSH 9.95,  LH 1.3,  E2 89,  H/H 14.3/43.8    FINDINGS:  Uterus:  Size: 4.1 x 5.6 x 8.7-cm  Masses: None  Endometrium: Uniform thickening of the endometrial echo complex up to 16-mm without appreciable heterogeneity, irregularity, nodularity, vascularity or calcifications.  Right ovary:  Size: 4.1 x 1.6 x 3.0-cm  Appearance: Normal  Vascular flow: Normal  Left ovary:  Size: 3.4 x 4.6 x 4.4-cm  Appearance: 3.0 x 3.7 x 3.6-cm left intra-ovarian simple appearing cystic lesion.  Vascular Flow: Normal  Free Fluid:  No significant pelvic free fluid appreciated.  Impression:  1. Uniform thickening of the endometrial echo complex up to 16-mm without appreciable heterogeneity, irregularity, nodularity, vascularity or calcifications.  Findings can be seen in secondary phase endometrium.  Correlate clinically for phase of menstruation.  2. 3.0 x 3.7 x 3.6-cm left intra-ovarian simple appearing cystic lesion.  Given the size and appearance, no follow-up is indicated unless otherwise clinically warranted.    We discussed the thickened appearing endometrium on ultrasound and the recommendation for EMBX - will schedule.

## 2024-01-05 ENCOUNTER — TELEPHONE (OUTPATIENT)
Dept: OBSTETRICS AND GYNECOLOGY | Facility: CLINIC | Age: 44
End: 2024-01-05
Payer: COMMERCIAL

## 2024-01-05 NOTE — TELEPHONE ENCOUNTER
"----- Message from Amy Martinez sent at 1/5/2024  4:13 PM CST -----  Regarding: Call back  "Type:  Patient Call Back    Who Called:PT    What is the reqeust in detail:Requesting call back to schedule procedure. Please advise    Can the clinic reply by [facility]?no     Best Call Back Number:[phone]      Additional Information:Schedule procedure and has question            "

## 2024-01-17 ENCOUNTER — TELEPHONE (OUTPATIENT)
Dept: INTERNAL MEDICINE | Facility: CLINIC | Age: 44
End: 2024-01-17
Payer: COMMERCIAL

## 2024-01-17 LAB
C TRACH DNA SPEC QL NAA+PROBE: NOT DETECTED
N GONORRHOEA DNA SPEC QL NAA+PROBE: NOT DETECTED

## 2024-01-17 NOTE — TELEPHONE ENCOUNTER
LVM that consult completed by Dr. Paz for PT faxed to supplied number. Confirmation received. She may call  for any questions that she may have.Seen by Dr Paz 01/02/24

## 2024-01-17 NOTE — TELEPHONE ENCOUNTER
----- Message from Gerard Acosta sent at 1/16/2024  3:43 PM CST -----  Name of Who is Calling:MARSHA YORK [[id number]]         What is the request in detail:PT needs you to send a referral to [place] for physical therapy there fax # [phone]         Can the clinic reply by [facility]: No         [facility][phone]

## 2024-01-18 ENCOUNTER — PATIENT MESSAGE (OUTPATIENT)
Dept: OBSTETRICS AND GYNECOLOGY | Facility: CLINIC | Age: 44
End: 2024-01-18
Payer: COMMERCIAL

## 2024-02-01 ENCOUNTER — PROCEDURE VISIT (OUTPATIENT)
Dept: OBSTETRICS AND GYNECOLOGY | Facility: CLINIC | Age: 44
End: 2024-02-01
Attending: OBSTETRICS & GYNECOLOGY
Payer: COMMERCIAL

## 2024-02-01 VITALS
BODY MASS INDEX: 35.38 KG/M2 | SYSTOLIC BLOOD PRESSURE: 117 MMHG | DIASTOLIC BLOOD PRESSURE: 96 MMHG | WEIGHT: 192.25 LBS | HEIGHT: 62 IN

## 2024-02-01 DIAGNOSIS — R30.0 DYSURIA: ICD-10-CM

## 2024-02-01 DIAGNOSIS — N93.9 ABNORMAL UTERINE BLEEDING (AUB): Primary | ICD-10-CM

## 2024-02-01 PROCEDURE — 88305 TISSUE EXAM BY PATHOLOGIST: CPT | Performed by: STUDENT IN AN ORGANIZED HEALTH CARE EDUCATION/TRAINING PROGRAM

## 2024-02-01 PROCEDURE — 87077 CULTURE AEROBIC IDENTIFY: CPT | Performed by: OBSTETRICS & GYNECOLOGY

## 2024-02-01 PROCEDURE — 87086 URINE CULTURE/COLONY COUNT: CPT | Performed by: OBSTETRICS & GYNECOLOGY

## 2024-02-01 PROCEDURE — 87186 SC STD MICRODIL/AGAR DIL: CPT | Performed by: OBSTETRICS & GYNECOLOGY

## 2024-02-01 PROCEDURE — 88305 TISSUE EXAM BY PATHOLOGIST: CPT | Mod: 26,,, | Performed by: STUDENT IN AN ORGANIZED HEALTH CARE EDUCATION/TRAINING PROGRAM

## 2024-02-01 PROCEDURE — 81025 URINE PREGNANCY TEST: CPT | Mod: S$GLB,,, | Performed by: OBSTETRICS & GYNECOLOGY

## 2024-02-01 PROCEDURE — 87088 URINE BACTERIA CULTURE: CPT | Performed by: OBSTETRICS & GYNECOLOGY

## 2024-02-01 PROCEDURE — 58100 BIOPSY OF UTERUS LINING: CPT | Mod: S$GLB,,, | Performed by: OBSTETRICS & GYNECOLOGY

## 2024-02-01 RX ORDER — MEDROXYPROGESTERONE ACETATE 10 MG/1
TABLET ORAL
Qty: 30 TABLET | Refills: 2 | Status: SHIPPED | OUTPATIENT
Start: 2024-02-01 | End: 2024-02-16

## 2024-02-01 NOTE — PROCEDURES
CC: ENDOMETRIAL BIOPSPY    Silke Fulton is a 43 y.o. female  presents for an endometrial biopsy secondary to abnormal bleeding.       UPT today: Negative    24 Note:  Previously, periods were generally occurring monthly, lasting 6 days in duration, without intermenstrual bleeding.  She describes skipping her period 2023 as well as 2023.  She then began bleeding 2023 and has been experiencing continued bleeding for the past 3 weeks (except for 3 days).  On some days, her bleeding is heavy with clots, while on other days light.  Denies pelvic pain but reports low back discomfort that radiates down into her leg.  No fever.  Previously, seeing Dr. Byers and Dr. Valladares.      We discussed hypermenorrhea and the various etiologies.  UPT today was negative.  GC/CT was performed to exclude an infectious etiology.  She will have pelvic ultrasound for evaluation of her uterus as well as labs for hormonal status.  We discussed the possible need for EMBX, based upon sono results.  She will begin Provera to try to acutely stop her bleeding.      After taking Provera bid for 5 days, her bleeding stopped and she reduced to daily Provera.  However, on 1/10/24 she began with light and several days later ran out of Provera after which her bleeding increased.  She continues to have moderate bleeding for about 3 weeks.  Recently, she had noted mild discomfort with void - will check urine culture.    UPT today is negative    24 Pelvic sono:  FSH 9.95,  LH 1.3,  E2 89,  H/H 14.3/43.8     FINDINGS:  Uterus:  Size: 4.1 x 5.6 x 8.7-cm  Masses: None  Endometrium: Uniform thickening of the endometrial echo complex up to 16-mm without appreciable heterogeneity, irregularity, nodularity, vascularity or calcifications.  Right ovary:  Size: 4.1 x 1.6 x 3.0-cm  Appearance: Normal  Vascular flow: Normal  Left ovary:  Size: 3.4 x 4.6 x 4.4-cm  Appearance: 3.0 x 3.7 x 3.6-cm left intra-ovarian simple appearing  cystic lesion.  Vascular Flow: Normal  Free Fluid:  No significant pelvic free fluid appreciated.  Impression:  1. Uniform thickening of the endometrial echo complex up to 16-mm without appreciable heterogeneity, irregularity, nodularity, vascularity or calcifications.  Findings can be seen in secondary phase endometrium.  Correlate clinically for phase of menstruation.  2. 3.0 x 3.7 x 3.6-cm left intra-ovarian simple appearing cystic lesion.  Given the size and appearance, no follow-up is indicated unless otherwise clinically warranted.      PRE ENDOMETRIAL BIOPSY COUNSELING:  The patient was informed of the risk of bleeding, infection, uterine perforation and pain and that the test will rule-out endometrial cancer with accuracy greater than 95%. She was counseled on the alternatives to endometrial biopsy and agrees to proceed.    TIME OUT PERFORMED.  The cervix was visualized with a speculum and prepped with betadine  Dark red blood noted at os and in vault.  A sterile endometrial pipelle was passed without difficulty to a depth of 8 cm.  Endometrial tissue was obtained.  The specimen was placed in formalyn and sent to Pathology for histology evaluation. The patient tolerated the procedure well.    ASSESSMENT and PLAN  1) Abnormal uterine bleeding  2) Dysuria    POST ENDOMETRIAL BIOPSY COUNSELING:  Manage post biopsy cramping with NSAIDs or Tylenol.  Expect spotting or light bleeding for a few days.  Report bleeding heavier than a period, fever > 101.0 F, worsening pain or a foul smelling vaginal discharge.    FOLLOW-UP: Pending biopsy results.  We will contact her to discuss biopsy results and treatment plan.  She will restart Provera bid, the reduce to daily once bleeding stops.  With dysuria, urine will be sent to culture.

## 2024-02-02 ENCOUNTER — TELEPHONE (OUTPATIENT)
Dept: OBSTETRICS AND GYNECOLOGY | Facility: CLINIC | Age: 44
End: 2024-02-02
Payer: COMMERCIAL

## 2024-02-02 RX ORDER — NITROFURANTOIN 25; 75 MG/1; MG/1
100 CAPSULE ORAL 2 TIMES DAILY
Qty: 10 CAPSULE | Refills: 0 | Status: SHIPPED | OUTPATIENT
Start: 2024-02-02 | End: 2024-02-07

## 2024-02-02 NOTE — TELEPHONE ENCOUNTER
Called patient:    Discussed results of urine culture:    PRESUMPTIVE E COLI   >100,000 cfu/ml   Identification and susceptibility pending     Reports having urinary symptoms.    Rx Macrobid sent to pharmacy.    To let us know if urinary symptoms are not resolved.    Reports that her bleeding seems to have stopped.

## 2024-02-03 LAB — BACTERIA UR CULT: ABNORMAL

## 2024-02-04 ENCOUNTER — PATIENT MESSAGE (OUTPATIENT)
Dept: OBSTETRICS AND GYNECOLOGY | Facility: CLINIC | Age: 44
End: 2024-02-04
Payer: COMMERCIAL

## 2024-02-05 LAB
COMMENT: NORMAL
FINAL PATHOLOGIC DIAGNOSIS: NORMAL
GROSS: NORMAL
Lab: NORMAL

## 2024-02-07 ENCOUNTER — TELEPHONE (OUTPATIENT)
Dept: OBSTETRICS AND GYNECOLOGY | Facility: CLINIC | Age: 44
End: 2024-02-07
Payer: COMMERCIAL

## 2024-02-07 NOTE — TELEPHONE ENCOUNTER
Called patient:    Discussed results of EMBX:      1. Endometrium, biopsy:     - Stromal breakdown with pseudo decidualized stroma.     - Benign strips of endocervical tissue.     - Background of hemorrhage.     - Negative for malignancy.     - See comment.     Comment Specimen shows fragments of benign colonic mucosa embedded within the endometrial tissue, favor contaminant.   I have discussed biopsy results with Dr. Leyva who feels that colonic mucosa most likely a contaminant from processing.  Embx had been easily performed to the appropriate depth without the need for tenaculum or cervical dilation.    She reports that her bleeding has spontaneously stopped without Provera.  For now, she will monitor and let us know her progress.  Bladder symptoms have resolved with Macrobid

## 2024-02-16 ENCOUNTER — OFFICE VISIT (OUTPATIENT)
Dept: OBSTETRICS AND GYNECOLOGY | Facility: CLINIC | Age: 44
End: 2024-02-16
Attending: OBSTETRICS & GYNECOLOGY
Payer: COMMERCIAL

## 2024-02-16 VITALS
BODY MASS INDEX: 35.41 KG/M2 | HEIGHT: 62 IN | DIASTOLIC BLOOD PRESSURE: 74 MMHG | WEIGHT: 192.44 LBS | SYSTOLIC BLOOD PRESSURE: 118 MMHG

## 2024-02-16 DIAGNOSIS — Z12.31 VISIT FOR SCREENING MAMMOGRAM: ICD-10-CM

## 2024-02-16 DIAGNOSIS — Z12.4 PAP SMEAR FOR CERVICAL CANCER SCREENING: ICD-10-CM

## 2024-02-16 DIAGNOSIS — Z01.419 WOMEN'S ANNUAL ROUTINE GYNECOLOGICAL EXAMINATION: Primary | ICD-10-CM

## 2024-02-16 PROCEDURE — 3074F SYST BP LT 130 MM HG: CPT | Mod: CPTII,S$GLB,, | Performed by: OBSTETRICS & GYNECOLOGY

## 2024-02-16 PROCEDURE — 99396 PREV VISIT EST AGE 40-64: CPT | Mod: S$GLB,,, | Performed by: OBSTETRICS & GYNECOLOGY

## 2024-02-16 PROCEDURE — 3078F DIAST BP <80 MM HG: CPT | Mod: CPTII,S$GLB,, | Performed by: OBSTETRICS & GYNECOLOGY

## 2024-02-16 PROCEDURE — 87624 HPV HI-RISK TYP POOLED RSLT: CPT | Performed by: OBSTETRICS & GYNECOLOGY

## 2024-02-16 PROCEDURE — 3008F BODY MASS INDEX DOCD: CPT | Mod: CPTII,S$GLB,, | Performed by: OBSTETRICS & GYNECOLOGY

## 2024-02-16 PROCEDURE — 99999 PR PBB SHADOW E&M-EST. PATIENT-LVL III: CPT | Mod: PBBFAC,,, | Performed by: OBSTETRICS & GYNECOLOGY

## 2024-02-16 PROCEDURE — 88175 CYTOPATH C/V AUTO FLUID REDO: CPT | Performed by: OBSTETRICS & GYNECOLOGY

## 2024-02-16 PROCEDURE — 1160F RVW MEDS BY RX/DR IN RCRD: CPT | Mod: CPTII,S$GLB,, | Performed by: OBSTETRICS & GYNECOLOGY

## 2024-02-16 PROCEDURE — 1159F MED LIST DOCD IN RCRD: CPT | Mod: CPTII,S$GLB,, | Performed by: OBSTETRICS & GYNECOLOGY

## 2024-02-16 NOTE — PROGRESS NOTES
Silke Fulton is a 43 y.o. female  who presents for annual exam.  She had previously been seen 24 for evaluation of abnormal uterine bleeding.  Testing included a pelvic ultrasound which revealed a 16 mm endometrial stripe and an EMBX with benign findings.  Her episode of prolonged bleeding spontaneously resolved and she plans to continue  monitoring her menses.   Patient's last menstrual period was 2024 (exact date).    24 Note:  Previously, periods were generally occurring monthly, lasting 6 days in duration, without intermenstrual bleeding.  She describes skipping her period 2023 as well as 2023.  She then began bleeding 2023 and has been experiencing continued bleeding for the past 3 weeks (except for 3 days).  On some days, her bleeding is heavy with clots, while on other days light     24 Pelvic sono:  FINDINGS:  Uterus:  Size: 4.1 x 5.6 x 8.7-cm  Masses: None  Endometrium: Uniform thickening of the endometrial echo complex up to 16-mm without appreciable heterogeneity, irregularity, nodularity, vascularity or calcifications.  Right ovary:  Size: 4.1 x 1.6 x 3.0-cm  Appearance: Normal  Vascular flow: Normal  Left ovary:  Size: 3.4 x 4.6 x 4.4-cm  Appearance: 3.0 x 3.7 x 3.6-cm left intra-ovarian simple appearing cystic lesion.  Vascular Flow: Normal  Free Fluid:  No significant pelvic free fluid appreciated.  Impression:  1. Uniform thickening of the endometrial echo complex up to 16-mm without appreciable heterogeneity, irregularity, nodularity, vascularity or calcifications.  Findings can be seen in secondary phase endometrium.  Correlate clinically for phase of menstruation.  2. 3.0 x 3.7 x 3.6-cm left intra-ovarian simple appearing cystic lesion.  Given the size and appearance, no follow-up is indicated unless otherwise clinically warranted.    24 EMBX:  1. Endometrium, biopsy:      - Stromal breakdown with pseudo decidualized stroma.      - Benign strips  of endocervical tissue.      - Background of hemorrhage.      - Negative for malignancy.      - See comment.     4/10/23 Mammogram: Negative, TC 17.95%      Past Medical History:   Diagnosis Date    Acute gastritis without mention of hemorrhage     Esophageal reflux     Meningitis     staph aureus    Migraine without aura, without mention of intractable migraine without mention of status migrainosus     Personal history of malignant neoplasm of brain     cerebellar astrocytoma    Tarsal tunnel syndrome        Past Surgical History:   Procedure Laterality Date    cerebellar astrocytoma      eye-superior oblique palsy      right mastoidectomy         OB History          0    Para        Term   0            AB        Living             SAB        IAB        Ectopic        Multiple        Live Births                     ROS:  GENERAL: Feeling well overall.   SKIN: Denies rash or lesions.   HEAD: Denies head injury or headache.   NODES: Denies enlarged lymph nodes.   CHEST: Denies chest pain or shortness of breath.   CARDIOVASCULAR: Denies palpitations or left sided chest pain.   ABDOMEN: No abdominal pain, nausea, vomiting or rectal bleeding.   URINARY: No dysuria or hematuria.  REPRODUCTIVE: See HPI.   BREASTS: Denies pain, lumps, or nipple discharge.   HEMATOLOGIC: Reports prolonged bleeding spontaneously stopped.  MUSCULOSKELETAL: Denies joint pain or swelling.   NEUROLOGIC: Denies syncope or weakness.   PSYCHIATRIC: Denies depression.    PE:   (chaperone present during entire exam)  APPEARANCE: Well nourished, well developed, in no acute distress.  BREASTS: Symmetrical, no skin changes or visible lesions. No palpable masses, nipple discharge or adenopathy bilaterally.  ABDOMEN: Soft. No tenderness or masses. No CVA tenderness.  VULVA: No lesions. Normal female genitalia.  URETHRAL MEATUS: Normal size and location, no lesions, no prolapse.  URETHRA: No masses, tenderness, prolapse or scarring.  VAGINA:  Moist and well rugated, no abnormal discharge, no significant cystocele or rectocele.  CERVIX: No lesions and discharge. PAP done.  UTERUS: Normal size, regular shape, mobile, non-tender, bladder base nontender.  ADNEXA: No masses, tenderness or CDS nodularity.  ANUS PERINEUM: Normal.      Diagnosis:  1. Women's annual routine gynecological examination    2. Pap smear for cervical cancer screening    3. Visit for screening mammogram          PLAN:    Orders Placed This Encounter    HPV High Risk Genotypes, PCR    Mammo Digital Screening Bilat w/ Zane    Liquid-Based Pap Smear, Screening       Patient was counseled today on the need for annual gyn exams.  We discussed her recent abnormal bleeding which has now resolved.  For now, she will monitor her menses and let us know her progress.    Follow-up in 1 year.

## 2024-02-19 ENCOUNTER — PATIENT MESSAGE (OUTPATIENT)
Dept: INTERNAL MEDICINE | Facility: CLINIC | Age: 44
End: 2024-02-19
Payer: COMMERCIAL

## 2024-02-19 DIAGNOSIS — U07.1 COVID-19: Primary | ICD-10-CM

## 2024-02-20 NOTE — TELEPHONE ENCOUNTER
Pt state she usually have the symptoms on a regular basis. Pt mentioned a headache, congestion/sinus drip, a cough, and leg pain but pt has started PT for that issue. Pt taking Nurtec for the headache and been taking Mucinex since Sunday.    Pt state the cold sx may have started Wed or Thurs which is around the time her dad symptoms began.

## 2024-02-21 ENCOUNTER — TELEPHONE (OUTPATIENT)
Dept: OBSTETRICS AND GYNECOLOGY | Facility: CLINIC | Age: 44
End: 2024-02-21
Payer: COMMERCIAL

## 2024-02-21 LAB
FINAL PATHOLOGIC DIAGNOSIS: NORMAL
Lab: NORMAL

## 2024-02-21 NOTE — TELEPHONE ENCOUNTER
FYI     Patient -  I actually dont have birth control. I was taken off because of my legs swelling, Im reluctant to get back on it because everything is so wacky right now. I do feel less congested so I think the Mucinex is working. I think Ill stick with that.

## 2024-03-11 ENCOUNTER — OFFICE VISIT (OUTPATIENT)
Dept: INTERNAL MEDICINE | Facility: CLINIC | Age: 44
End: 2024-03-11
Payer: COMMERCIAL

## 2024-03-11 VITALS
HEART RATE: 80 BPM | WEIGHT: 198 LBS | BODY MASS INDEX: 36.44 KG/M2 | OXYGEN SATURATION: 98 % | HEIGHT: 62 IN | DIASTOLIC BLOOD PRESSURE: 78 MMHG | SYSTOLIC BLOOD PRESSURE: 120 MMHG

## 2024-03-11 DIAGNOSIS — Z76.89 ENCOUNTER TO ESTABLISH CARE WITH NEW DOCTOR: ICD-10-CM

## 2024-03-11 DIAGNOSIS — Z23 NEED FOR VACCINATION: ICD-10-CM

## 2024-03-11 DIAGNOSIS — Z12.11 ENCOUNTER FOR SCREENING FOR MALIGNANT NEOPLASM OF COLON: ICD-10-CM

## 2024-03-11 DIAGNOSIS — Z12.39 ENCOUNTER FOR OTHER SCREENING FOR MALIGNANT NEOPLASM OF BREAST: ICD-10-CM

## 2024-03-11 DIAGNOSIS — Z00.00 ENCOUNTER FOR ANNUAL GENERAL MEDICAL EXAMINATION WITHOUT ABNORMAL FINDINGS IN ADULT: Primary | ICD-10-CM

## 2024-03-11 PROCEDURE — 1159F MED LIST DOCD IN RCRD: CPT | Mod: CPTII,S$GLB,, | Performed by: FAMILY MEDICINE

## 2024-03-11 PROCEDURE — 3078F DIAST BP <80 MM HG: CPT | Mod: CPTII,S$GLB,, | Performed by: FAMILY MEDICINE

## 2024-03-11 PROCEDURE — 1160F RVW MEDS BY RX/DR IN RCRD: CPT | Mod: CPTII,S$GLB,, | Performed by: FAMILY MEDICINE

## 2024-03-11 PROCEDURE — 3074F SYST BP LT 130 MM HG: CPT | Mod: CPTII,S$GLB,, | Performed by: FAMILY MEDICINE

## 2024-03-11 PROCEDURE — 99396 PREV VISIT EST AGE 40-64: CPT | Mod: S$GLB,,, | Performed by: FAMILY MEDICINE

## 2024-03-11 PROCEDURE — 99999 PR PBB SHADOW E&M-EST. PATIENT-LVL V: CPT | Mod: PBBFAC,,, | Performed by: FAMILY MEDICINE

## 2024-03-11 PROCEDURE — 3008F BODY MASS INDEX DOCD: CPT | Mod: CPTII,S$GLB,, | Performed by: FAMILY MEDICINE

## 2024-03-11 NOTE — PROGRESS NOTES
Subjective:     Patient ID: Silke Fluton is a 43 y.o. female.   Chief Complaint: Annual Exam and Establish Care    HPI:  Patient presents to establish care.  Patient is due for annual exam and labs.    No acute concerns. No refills needed.    Review of Problems & History:  Patient Active Problem List   Diagnosis    Chronic migraine without aura, with intractable migraine, so stated, with status migrainosus    Occipital neuralgia    Dizzinesses    Personal history of malignant neoplasm of brain    Leg swelling    Cervicogenic headache    Acute right-sided low back pain with right-sided sciatica      Past Medical History:   Diagnosis Date    Acute gastritis without mention of hemorrhage     Esophageal reflux     Meningitis     staph aureus    Migraine without aura, without mention of intractable migraine without mention of status migrainosus     Personal history of malignant neoplasm of brain     cerebellar astrocytoma    Tarsal tunnel syndrome       Past Surgical History:   Procedure Laterality Date    cerebellar astrocytoma      eye-superior oblique palsy      right mastoidectomy        Social History     Socioeconomic History    Marital status:     Number of children: 0    Years of education: 20   Occupational History    Occupation: Call Center Counsellor     Employer: [employer]   Tobacco Use    Smoking status: Never     Passive exposure: Never    Smokeless tobacco: Never   Substance and Sexual Activity    Alcohol use: Yes     Alcohol/week: 2.0 standard drinks of alcohol     Types: 2 Standard drinks or equivalent per week     Comment: socially    Drug use: No    Sexual activity: Yes     Partners: Male     Birth control/protection: None   Social History Narrative          Health Maintenance:  Colon Cancer Screening  Due for screening  Lung Cancer Screening  Never smoker  Cervical Cancer Screening  UTD  Breast Cancer Screening  Scheduled for 4/11/24    Vaccines  Seasonal Influenza: Due for  booster  COVID-19: Due for booster  RSV: Not indicated  Tetanus: UTD  Shingles: Not indicated  Pneumococcal: Not indicated      Medication Review:    Current Outpatient Medications:     baclofen (LIORESAL) 10 MG tablet, Take 1 tablet (10 mg total) by mouth 3 (three) times daily as needed (neck muscle spasm)., Disp: 90 tablet, Rfl: 3    cetirizine (ZYRTEC) 10 MG tablet, Take 10 mg by mouth once daily., Disp: , Rfl:     gabapentin (NEURONTIN) 300 MG capsule, Take 2 capsules (600 mg total) by mouth every evening., Disp: 180 capsule, Rfl: 3    lifitegrast (XIIDRA) 5 % Dpet, Apply 1 drop to eye every 12 (twelve) hours., Disp: 180 each, Rfl: 4    magnesium 250 mg Tab, Take 500 mg by mouth once daily. , Disp: , Rfl:     multivitamin (THERAGRAN) per tablet, Take 1 tablet by mouth once daily., Disp: , Rfl:     NURTEC 75 mg odt, TAKE 1 TABLET (75 MG TOTAL) BY MOUTH DAILY AS NEEDED FOR MIGRAINE., Disp: 8 tablet, Rfl: 12    polycarbophil (FIBERCON) 625 mg tablet, Take 625 mg by mouth once daily., Disp: , Rfl:     propranoloL (INDERAL) 40 MG tablet, Take 1 tablet (40 mg total) by mouth 2 (two) times daily., Disp: 180 tablet, Rfl: 3     Review of Systems   Constitutional:  Negative for chills, fatigue and fever.   HENT:  Negative for nasal congestion, ear pain, postnasal drip and sore throat.    Eyes:  Negative for visual disturbance.   Respiratory:  Negative for cough, shortness of breath and wheezing.    Cardiovascular:  Negative for chest pain and palpitations.   Gastrointestinal:  Negative for abdominal pain, change in bowel habit, constipation, diarrhea, nausea and vomiting.   Genitourinary:  Negative for dysuria and hematuria.   Musculoskeletal:  Negative for back pain, leg pain and neck pain.   Neurological:  Negative for dizziness, numbness and headaches.   Hematological:  Negative for adenopathy.   Psychiatric/Behavioral:  Negative for dysphoric mood, sleep disturbance and suicidal ideas. The patient is not  "nervous/anxious.           Objective:      Vitals:    03/11/24 1553   BP: 120/78   BP Location: Left arm   Patient Position: Sitting   BP Method: Medium (Manual)   Pulse: 80   SpO2: 98%   Weight: 89.8 kg (197 lb 15.6 oz)   Height: 5' 2" (1.575 m)      Physical Exam  Vitals and nursing note reviewed.   Constitutional:       General: She is not in acute distress.     Appearance: Normal appearance. She is not ill-appearing.   HENT:      Head: Normocephalic and atraumatic.      Right Ear: Tympanic membrane, ear canal and external ear normal. There is no impacted cerumen.      Left Ear: Tympanic membrane, ear canal and external ear normal. There is no impacted cerumen.      Nose: Nose normal. No congestion or rhinorrhea.      Mouth/Throat:      Mouth: Mucous membranes are moist.      Pharynx: Oropharynx is clear. No oropharyngeal exudate or posterior oropharyngeal erythema.   Eyes:      General: No scleral icterus.        Right eye: No discharge.         Left eye: No discharge.      Conjunctiva/sclera: Conjunctivae normal.   Neck:      Thyroid: No thyroid mass, thyromegaly or thyroid tenderness.   Cardiovascular:      Rate and Rhythm: Normal rate and regular rhythm.      Pulses: Normal pulses.      Heart sounds: Normal heart sounds. No murmur heard.     No friction rub. No gallop.   Pulmonary:      Effort: Pulmonary effort is normal. No respiratory distress.      Breath sounds: Normal breath sounds. No wheezing, rhonchi or rales.   Abdominal:      General: Abdomen is flat. Bowel sounds are normal. There is no distension.      Palpations: Abdomen is soft. There is no mass.      Tenderness: There is no abdominal tenderness. There is no guarding.   Musculoskeletal:         General: No swelling or deformity. Normal range of motion.      Cervical back: Normal range of motion and neck supple. No tenderness.   Lymphadenopathy:      Cervical: No cervical adenopathy.   Skin:     General: Skin is warm and dry.   Neurological:      " General: No focal deficit present.      Mental Status: She is alert and oriented to person, place, and time. Mental status is at baseline.      Gait: Gait normal.      Deep Tendon Reflexes: Reflexes normal.   Psychiatric:         Mood and Affect: Mood normal.         Behavior: Behavior normal.         Thought Content: Thought content normal.         Judgment: Judgment normal.           Assessment:       Problem List Items Addressed This Visit    None  Visit Diagnoses       Encounter for annual general medical examination without abnormal findings in adult    -  Primary    Relevant Orders    Comprehensive Metabolic Panel    CBC Auto Differential    Lipid Panel    Hemoglobin A1C    TSH    T4, Free    HIV 1/2 Ag/Ab (4th Gen)    Hepatitis C Antibody    Encounter to establish care with new doctor        Need for vaccination        Encounter for screening for malignant neoplasm of colon        Relevant Orders    Ambulatory referral/consult to Endo Procedure     Encounter for other screening for malignant neoplasm of breast                  Plan:       1. Encounter for annual general medical examination without abnormal findings in adult  Health maintenance updated  Chronic issues reviewed  Fasting labs ordered  - Comprehensive Metabolic Panel; Future  - CBC Auto Differential; Future  - Lipid Panel; Future  - Hemoglobin A1C; Future  - TSH; Future  - T4, Free; Future  - HIV 1/2 Ag/Ab (4th Gen); Future  - Hepatitis C Antibody; Future    2. Encounter to establish care with new doctor  Reviewed chronic medical conditions, updated problem list and medication list    3. Need for vaccination  Influenza declined  COVID declined    4. Encounter for screening for malignant neoplasm of colon  - Ambulatory referral/consult to Endo Procedure ; Future    5. Encounter for other screening for malignant neoplasm of breast  Mammo already scheduled          Follow up in about 1 year (around 3/11/2025) for Fasting labs,  Annual Visit.     Tesfaye Bennett MD, FAAFP  Family Medicine Physician  [facility]  03/11/2024

## 2024-03-16 ENCOUNTER — LAB VISIT (OUTPATIENT)
Dept: LAB | Facility: HOSPITAL | Age: 44
End: 2024-03-16
Payer: COMMERCIAL

## 2024-03-16 DIAGNOSIS — Z00.00 ENCOUNTER FOR ANNUAL GENERAL MEDICAL EXAMINATION WITHOUT ABNORMAL FINDINGS IN ADULT: ICD-10-CM

## 2024-03-16 LAB
ALBUMIN SERPL BCP-MCNC: 3.7 G/DL (ref 3.5–5.2)
ALP SERPL-CCNC: 73 U/L (ref 55–135)
ALT SERPL W/O P-5'-P-CCNC: 21 U/L (ref 10–44)
ANION GAP SERPL CALC-SCNC: 9 MMOL/L (ref 8–16)
AST SERPL-CCNC: 20 U/L (ref 10–40)
BASOPHILS # BLD AUTO: 0.05 K/UL (ref 0–0.2)
BASOPHILS NFR BLD: 0.7 % (ref 0–1.9)
BILIRUB SERPL-MCNC: 0.3 MG/DL (ref 0.1–1)
BUN SERPL-MCNC: 11 MG/DL (ref 6–20)
CALCIUM SERPL-MCNC: 9.2 MG/DL (ref 8.7–10.5)
CHLORIDE SERPL-SCNC: 107 MMOL/L (ref 95–110)
CHOLEST SERPL-MCNC: 156 MG/DL (ref 120–199)
CHOLEST/HDLC SERPL: 7.1 {RATIO} (ref 2–5)
CO2 SERPL-SCNC: 23 MMOL/L (ref 23–29)
CREAT SERPL-MCNC: 0.8 MG/DL (ref 0.5–1.4)
DIFFERENTIAL METHOD BLD: ABNORMAL
EOSINOPHIL # BLD AUTO: 0.2 K/UL (ref 0–0.5)
EOSINOPHIL NFR BLD: 3.2 % (ref 0–8)
ERYTHROCYTE [DISTWIDTH] IN BLOOD BY AUTOMATED COUNT: 13.3 % (ref 11.5–14.5)
EST. GFR  (NO RACE VARIABLE): >60 ML/MIN/1.73 M^2
ESTIMATED AVG GLUCOSE: 100 MG/DL (ref 68–131)
GLUCOSE SERPL-MCNC: 93 MG/DL (ref 70–110)
HBA1C MFR BLD: 5.1 % (ref 4–5.6)
HCT VFR BLD AUTO: 44.1 % (ref 37–48.5)
HCV AB SERPL QL IA: NORMAL
HDLC SERPL-MCNC: 22 MG/DL (ref 40–75)
HDLC SERPL: 14.1 % (ref 20–50)
HGB BLD-MCNC: 14 G/DL (ref 12–16)
HIV 1+2 AB+HIV1 P24 AG SERPL QL IA: NORMAL
IMM GRANULOCYTES # BLD AUTO: 0.02 K/UL (ref 0–0.04)
IMM GRANULOCYTES NFR BLD AUTO: 0.3 % (ref 0–0.5)
LDLC SERPL CALC-MCNC: 113.2 MG/DL (ref 63–159)
LYMPHOCYTES # BLD AUTO: 2.4 K/UL (ref 1–4.8)
LYMPHOCYTES NFR BLD: 32.4 % (ref 18–48)
MCH RBC QN AUTO: 29.2 PG (ref 27–31)
MCHC RBC AUTO-ENTMCNC: 31.7 G/DL (ref 32–36)
MCV RBC AUTO: 92 FL (ref 82–98)
MONOCYTES # BLD AUTO: 0.7 K/UL (ref 0.3–1)
MONOCYTES NFR BLD: 9.1 % (ref 4–15)
NEUTROPHILS # BLD AUTO: 3.9 K/UL (ref 1.8–7.7)
NEUTROPHILS NFR BLD: 54.3 % (ref 38–73)
NONHDLC SERPL-MCNC: 134 MG/DL
NRBC BLD-RTO: 0 /100 WBC
PLATELET # BLD AUTO: 184 K/UL (ref 150–450)
PMV BLD AUTO: 9.9 FL (ref 9.2–12.9)
POTASSIUM SERPL-SCNC: 4.5 MMOL/L (ref 3.5–5.1)
PROT SERPL-MCNC: 6.8 G/DL (ref 6–8.4)
RBC # BLD AUTO: 4.8 M/UL (ref 4–5.4)
SODIUM SERPL-SCNC: 139 MMOL/L (ref 136–145)
T4 FREE SERPL-MCNC: 0.9 NG/DL (ref 0.71–1.51)
TRIGL SERPL-MCNC: 104 MG/DL (ref 30–150)
TSH SERPL DL<=0.005 MIU/L-ACNC: 2.25 UIU/ML (ref 0.4–4)
WBC # BLD AUTO: 7.25 K/UL (ref 3.9–12.7)

## 2024-03-16 PROCEDURE — 85025 COMPLETE CBC W/AUTO DIFF WBC: CPT | Performed by: FAMILY MEDICINE

## 2024-03-16 PROCEDURE — 84439 ASSAY OF FREE THYROXINE: CPT | Performed by: FAMILY MEDICINE

## 2024-03-16 PROCEDURE — 36415 COLL VENOUS BLD VENIPUNCTURE: CPT | Performed by: FAMILY MEDICINE

## 2024-03-16 PROCEDURE — 86803 HEPATITIS C AB TEST: CPT | Performed by: FAMILY MEDICINE

## 2024-03-16 PROCEDURE — 80061 LIPID PANEL: CPT | Performed by: FAMILY MEDICINE

## 2024-03-16 PROCEDURE — 83036 HEMOGLOBIN GLYCOSYLATED A1C: CPT | Performed by: FAMILY MEDICINE

## 2024-03-16 PROCEDURE — 80053 COMPREHEN METABOLIC PANEL: CPT | Performed by: FAMILY MEDICINE

## 2024-03-16 PROCEDURE — 87389 HIV-1 AG W/HIV-1&-2 AB AG IA: CPT | Performed by: FAMILY MEDICINE

## 2024-03-16 PROCEDURE — 84443 ASSAY THYROID STIM HORMONE: CPT | Performed by: FAMILY MEDICINE

## 2024-03-18 ENCOUNTER — PATIENT MESSAGE (OUTPATIENT)
Dept: INTERNAL MEDICINE | Facility: CLINIC | Age: 44
End: 2024-03-18
Payer: COMMERCIAL

## 2024-04-11 ENCOUNTER — HOSPITAL ENCOUNTER (OUTPATIENT)
Dept: RADIOLOGY | Facility: HOSPITAL | Age: 44
Discharge: HOME | End: 2024-04-11
Attending: OBSTETRICS & GYNECOLOGY
Payer: COMMERCIAL

## 2024-04-11 DIAGNOSIS — Z12.31 VISIT FOR SCREENING MAMMOGRAM: ICD-10-CM

## 2024-04-11 PROCEDURE — 77067 SCR MAMMO BI INCL CAD: CPT | Mod: TC

## 2024-04-11 PROCEDURE — 77063 BREAST TOMOSYNTHESIS BI: CPT | Mod: 26,,, | Performed by: RADIOLOGY

## 2024-04-11 PROCEDURE — 77067 SCR MAMMO BI INCL CAD: CPT | Mod: 26,,, | Performed by: RADIOLOGY

## 2024-04-12 ENCOUNTER — PATIENT MESSAGE (OUTPATIENT)
Dept: NEUROLOGY | Facility: CLINIC | Age: 44
End: 2024-04-12
Payer: COMMERCIAL

## 2024-04-17 ENCOUNTER — PATIENT MESSAGE (OUTPATIENT)
Dept: OBSTETRICS AND GYNECOLOGY | Facility: CLINIC | Age: 44
End: 2024-04-17
Payer: COMMERCIAL

## 2024-05-09 ENCOUNTER — TELEPHONE (OUTPATIENT)
Dept: ENDOSCOPY | Facility: HOSPITAL | Age: 44
End: 2024-05-09

## 2024-05-09 ENCOUNTER — PATIENT MESSAGE (OUTPATIENT)
Dept: ENDOSCOPY | Facility: HOSPITAL | Age: 44
End: 2024-05-09

## 2024-05-09 NOTE — TELEPHONE ENCOUNTER
Attempted to contact patient to schedule colonoscopy. The patient did not answer the call.  Left voice message  requesting a call back at [phone] to get procedure scheduled.

## 2024-05-23 ENCOUNTER — OFFICE VISIT (OUTPATIENT)
Dept: NEUROLOGY | Facility: CLINIC | Age: 44
End: 2024-05-23
Payer: COMMERCIAL

## 2024-05-23 VITALS
BODY MASS INDEX: 36.21 KG/M2 | SYSTOLIC BLOOD PRESSURE: 124 MMHG | DIASTOLIC BLOOD PRESSURE: 82 MMHG | HEART RATE: 79 BPM | HEIGHT: 62 IN

## 2024-05-23 DIAGNOSIS — M54.31 RIGHT SIDED SCIATICA: ICD-10-CM

## 2024-05-23 DIAGNOSIS — G44.86 CERVICOGENIC HEADACHE: ICD-10-CM

## 2024-05-23 DIAGNOSIS — M54.81 BILATERAL OCCIPITAL NEURALGIA: ICD-10-CM

## 2024-05-23 DIAGNOSIS — G43.711 CHRONIC MIGRAINE WITHOUT AURA, WITH INTRACTABLE MIGRAINE, SO STATED, WITH STATUS MIGRAINOSUS: Primary | ICD-10-CM

## 2024-05-23 PROCEDURE — 1160F RVW MEDS BY RX/DR IN RCRD: CPT | Mod: CPTII,S$GLB,, | Performed by: PSYCHIATRY & NEUROLOGY

## 2024-05-23 PROCEDURE — 3008F BODY MASS INDEX DOCD: CPT | Mod: CPTII,S$GLB,, | Performed by: PSYCHIATRY & NEUROLOGY

## 2024-05-23 PROCEDURE — 1159F MED LIST DOCD IN RCRD: CPT | Mod: CPTII,S$GLB,, | Performed by: PSYCHIATRY & NEUROLOGY

## 2024-05-23 PROCEDURE — 3079F DIAST BP 80-89 MM HG: CPT | Mod: CPTII,S$GLB,, | Performed by: PSYCHIATRY & NEUROLOGY

## 2024-05-23 PROCEDURE — 3074F SYST BP LT 130 MM HG: CPT | Mod: CPTII,S$GLB,, | Performed by: PSYCHIATRY & NEUROLOGY

## 2024-05-23 PROCEDURE — 3044F HG A1C LEVEL LT 7.0%: CPT | Mod: CPTII,S$GLB,, | Performed by: PSYCHIATRY & NEUROLOGY

## 2024-05-23 PROCEDURE — 99999 PR PBB SHADOW E&M-EST. PATIENT-LVL III: CPT | Mod: PBBFAC,,, | Performed by: PSYCHIATRY & NEUROLOGY

## 2024-05-23 PROCEDURE — 99214 OFFICE O/P EST MOD 30 MIN: CPT | Mod: S$GLB,,, | Performed by: PSYCHIATRY & NEUROLOGY

## 2024-05-23 RX ORDER — TIZANIDINE 4 MG/1
4 TABLET ORAL 2 TIMES DAILY PRN
Qty: 30 TABLET | Refills: 12 | Status: SHIPPED | OUTPATIENT
Start: 2024-05-23 | End: 2025-05-18

## 2024-05-23 RX ORDER — BACLOFEN 10 MG/1
10 TABLET ORAL 3 TIMES DAILY PRN
Qty: 90 TABLET | Refills: 3 | Status: SHIPPED | OUTPATIENT
Start: 2024-05-23 | End: 2024-05-23

## 2024-05-23 RX ORDER — PROPRANOLOL HYDROCHLORIDE 40 MG/1
40 TABLET ORAL 2 TIMES DAILY
Qty: 180 TABLET | Refills: 3 | Status: SHIPPED | OUTPATIENT
Start: 2024-05-23

## 2024-05-23 RX ORDER — GABAPENTIN 300 MG/1
600 CAPSULE ORAL NIGHTLY
Qty: 180 CAPSULE | Refills: 3 | Status: SHIPPED | OUTPATIENT
Start: 2024-05-23

## 2024-05-23 RX ORDER — RIMEGEPANT SULFATE 75 MG/75MG
75 TABLET, ORALLY DISINTEGRATING ORAL DAILY PRN
Qty: 8 TABLET | Refills: 12 | Status: SHIPPED | OUTPATIENT
Start: 2024-05-23

## 2024-05-23 NOTE — PROGRESS NOTES
Subjective:       Patient ID: Silke Fulton is a 43 y.o. female.    Reason for Consult: Follow-up      Interval History:  Silke Fulton is here for follow up. Their condition is overall clinically stable.   She notes that she has had a bad last 2 weeks but believes this may be triggered by seasonal allergies.  At this time she notes that she has not taking the baclofen.  She also notes that she has run out of propranolol and believes that this may also be a reason why her headaches have been worse in the last 2 weeks.  We have discussed her medication regimen at this time in depth and have made plans to try to improve her control of her headaches.     Objective:     Vitals:    05/23/24 1117   BP: 124/82   Pulse: 79     Patient is awake, alert and oriented to person, place and time. Moves all extremities antigravity. Cranial Nerves 2-12 without focal deficit. Gait and station normal.    Focused examination was undertaken today. Most of the visit time was spent giving guidance, counseling and discussing treatment options.    Assessment/Plan:     Problem List Items Addressed This Visit          Neuro    Chronic migraine without aura, with intractable migraine, so stated, with status migrainosus - Primary    Overview     Preventive - gabapentin QHS, propranolol BID  Rescue - Nurtec (has failed Imitrex, Maxalt, Amerge, Naproxen, Ketorolac, Baclofen)  Triggers - weather, humidity, stress, hormones    1-4 days a month          Relevant Medications    gabapentin (NEURONTIN) 300 MG capsule    NURTEC 75 mg odt    propranoloL (INDERAL) 40 MG tablet    Cervicogenic headache    Relevant Medications    tiZANidine (ZANAFLEX) 4 MG tablet       Orthopedic    Occipital neuralgia    Relevant Medications    gabapentin (NEURONTIN) 300 MG capsule    tiZANidine (ZANAFLEX) 4 MG tablet     Other Visit Diagnoses       Right sided sciatica        Relevant Medications    tiZANidine (ZANAFLEX) 4 MG tablet             43-year-old female presents for evaluation and follow-up of complex multifactorial headaches.  At this time we have discussed refilling her medications as outlined above.  I will give her a year's worth of supply propranolol, gabapentin, Nurtec and start her on Zanaflex for both her neck pain and some of her sciatic pain.  She is currently in physical therapy for her sciatica issues on the right.  We have discussed the importance of continued exercise as she is able to tolerate this to help with that issue in particular.   The patient will be seen back in the headache division within the next 3-6 months.     The patient verbalizes understanding and agreement with the treatment plan. I have discussed risks, benefits and alternatives to the treatment plan. Questions were sought and answered to her stated verbal satisfaction.          Lakhwinder Luther MD, FAAN    This note is dictated on M*Modal Fluency Direct word recognition program. There are word recognition mistakes that are occasionally missed on review.

## 2024-06-10 ENCOUNTER — PATIENT MESSAGE (OUTPATIENT)
Dept: INTERNAL MEDICINE | Facility: CLINIC | Age: 44
End: 2024-06-10
Payer: COMMERCIAL

## 2024-08-21 ENCOUNTER — TELEPHONE (OUTPATIENT)
Dept: NEUROLOGY | Facility: CLINIC | Age: 44
End: 2024-08-21
Payer: COMMERCIAL

## 2024-09-08 ENCOUNTER — PATIENT MESSAGE (OUTPATIENT)
Dept: INTERNAL MEDICINE | Facility: CLINIC | Age: 44
End: 2024-09-08
Payer: COMMERCIAL

## 2024-09-17 ENCOUNTER — HOSPITAL ENCOUNTER (OUTPATIENT)
Dept: RADIOLOGY | Facility: HOSPITAL | Age: 44
Discharge: HOME | End: 2024-09-17
Attending: FAMILY MEDICINE
Payer: COMMERCIAL

## 2024-09-17 ENCOUNTER — OFFICE VISIT (OUTPATIENT)
Dept: INTERNAL MEDICINE | Facility: CLINIC | Age: 44
End: 2024-09-17
Payer: COMMERCIAL

## 2024-09-17 VITALS
SYSTOLIC BLOOD PRESSURE: 110 MMHG | DIASTOLIC BLOOD PRESSURE: 78 MMHG | WEIGHT: 202.19 LBS | BODY MASS INDEX: 37.21 KG/M2 | HEIGHT: 62 IN

## 2024-09-17 DIAGNOSIS — M70.61 TROCHANTERIC BURSITIS OF RIGHT HIP: Primary | ICD-10-CM

## 2024-09-17 DIAGNOSIS — G89.29 CHRONIC BILATERAL LOW BACK PAIN WITH RIGHT-SIDED SCIATICA: ICD-10-CM

## 2024-09-17 DIAGNOSIS — M54.41 CHRONIC BILATERAL LOW BACK PAIN WITH RIGHT-SIDED SCIATICA: ICD-10-CM

## 2024-09-17 PROCEDURE — 99214 OFFICE O/P EST MOD 30 MIN: CPT | Mod: S$GLB,,, | Performed by: FAMILY MEDICINE

## 2024-09-17 PROCEDURE — 3074F SYST BP LT 130 MM HG: CPT | Mod: CPTII,S$GLB,, | Performed by: FAMILY MEDICINE

## 2024-09-17 PROCEDURE — 3008F BODY MASS INDEX DOCD: CPT | Mod: CPTII,S$GLB,, | Performed by: FAMILY MEDICINE

## 2024-09-17 PROCEDURE — 3044F HG A1C LEVEL LT 7.0%: CPT | Mod: CPTII,S$GLB,, | Performed by: FAMILY MEDICINE

## 2024-09-17 PROCEDURE — 3078F DIAST BP <80 MM HG: CPT | Mod: CPTII,S$GLB,, | Performed by: FAMILY MEDICINE

## 2024-09-17 PROCEDURE — 99999 PR PBB SHADOW E&M-EST. PATIENT-LVL III: CPT | Mod: PBBFAC,,, | Performed by: FAMILY MEDICINE

## 2024-09-17 PROCEDURE — 1159F MED LIST DOCD IN RCRD: CPT | Mod: CPTII,S$GLB,, | Performed by: FAMILY MEDICINE

## 2024-09-17 PROCEDURE — 72148 MRI LUMBAR SPINE W/O DYE: CPT | Mod: 26,,, | Performed by: RADIOLOGY

## 2024-09-17 PROCEDURE — 72148 MRI LUMBAR SPINE W/O DYE: CPT | Mod: TC

## 2024-09-17 RX ORDER — BACLOFEN 10 MG/1
10 TABLET ORAL 2 TIMES DAILY
COMMUNITY

## 2024-09-17 RX ORDER — METHYLPREDNISOLONE 4 MG/1
TABLET ORAL
Qty: 21 EACH | Refills: 0 | Status: SHIPPED | OUTPATIENT
Start: 2024-09-17 | End: 2024-10-08

## 2024-09-17 NOTE — PROGRESS NOTES
"Subjective:     Patient ID: Silke Fulton is a 44 y.o. female.   Chief Complaint: Back Pain and Mass    HPI:  Pt presenting with several days of exacerbated chronic LBP    Reports from spring until recently she had been dealing with dull aching chronic back pain. This was c/w back pain that she'd had before. Saw her previous PCP for this issue, who obtained Lspine XR. Showed degenerative changes.    Reports had severe exacerbation over the weekend including radiating sciatica pain down the R leg. Felt burning pain with numbness and tingling going all the way to her foot. Felt some spasms along the lumbar musculature during this as well. Says in the days since the sx have improved some, now more or less back to the dull ache. She has seen PT for this earlier in the year and has been compliant with exercises and stretches provided to her.    Also c/o R hip pain laterally which extends into the R lateral thigh. Says it feels tight and is quite tender to touch. She says she can feel a mass at the site of pain. Pt did not endorse yes or no whether she had a recent fall contributing to her symptoms today, declined to answer.    Review of Systems   Constitutional:  Negative for activity change, chills and fever.   Genitourinary:  Negative for bladder incontinence, decreased urine volume, difficulty urinating, frequency and urgency.   Musculoskeletal:  Positive for back pain, joint swelling and leg pain. Negative for arthralgias, gait problem, neck pain and neck stiffness.   Neurological:  Positive for numbness. Negative for weakness.          Objective:      Vitals:    09/17/24 1617   BP: 110/78   BP Location: Left arm   Patient Position: Sitting   BP Method: Large (Manual)   Weight: 91.7 kg (202 lb 2.6 oz)   Height: 5' 2" (1.575 m)      Physical Exam  Constitutional:       General: She is not in acute distress.     Appearance: Normal appearance. She is not ill-appearing.   Pulmonary:      Effort: Pulmonary " effort is normal. No respiratory distress.   Musculoskeletal:         General: Swelling and tenderness present.      Cervical back: Normal. No tenderness or bony tenderness. Normal range of motion.      Thoracic back: Normal. No tenderness or bony tenderness. Normal range of motion.      Lumbar back: Spasms present. No tenderness or bony tenderness. Normal range of motion. Negative right straight leg raise test and negative left straight leg raise test.        Back:         Legs:       Comments: Tenderness separate from midline involving lumbar musculature on the R; right trochanteric bursa tender to palpation.    Noted asymmetric nonedematous swelling of the LLE. Pt has had this worked up extensively over the last 10 years. VTE and other circulatory issues have been r/o previously. Appearance today is at baseline.   Neurological:      General: No focal deficit present.      Mental Status: She is alert and oriented to person, place, and time. Mental status is at baseline.      Cranial Nerves: No dysarthria.      Sensory: No sensory deficit.      Motor: No weakness.      Deep Tendon Reflexes: Reflexes normal.   Psychiatric:         Mood and Affect: Mood normal.         Behavior: Behavior normal.         Thought Content: Thought content normal.         Judgment: Judgment normal.           Assessment:       Problem List Items Addressed This Visit    None  Visit Diagnoses       Trochanteric bursitis of right hip    -  Primary    Relevant Medications    methylPREDNISolone (MEDROL DOSEPACK) 4 mg tablet    Chronic bilateral low back pain with right-sided sciatica        Relevant Orders    MRI Lumbar Spine Without Contrast (Completed)              Plan:       1. Trochanteric bursitis of right hip  Exam c/w trochanteric bursitis  Rx medrol dosepack; advised no additional NSAIDs while taking the steroid  RTC for continued or worsened sx  - methylPREDNISolone (MEDROL DOSEPACK) 4 mg tablet; use as directed  Dispense: 21 each;  Refill: 0    2. Chronic bilateral low back pain with right-sided sciatica  Exacerbation over the weekend highly c/w sciatica  Reviewed prior w/u and tx  Sending pt for MRI L spine  Consider reengagement with PT vs ref to sports med or ortho  No red flags on exam  - MRI Lumbar Spine Without Contrast; Future          Follow up in about 4 weeks (around 10/15/2024).     Tesfaye Bennett MD, [facility]  Family Medicine Physician  [facility]  09/18/2024      This note was produced using voice recognition technology. Some typographical or syntax errors may be present, despite best efforts with proofreading.

## 2024-09-18 DIAGNOSIS — M48.00 CENTRAL STENOSIS OF SPINAL CANAL: Primary | ICD-10-CM

## 2024-09-18 DIAGNOSIS — M54.41 CHRONIC BILATERAL LOW BACK PAIN WITH RIGHT-SIDED SCIATICA: ICD-10-CM

## 2024-09-18 DIAGNOSIS — G89.29 CHRONIC BILATERAL LOW BACK PAIN WITH RIGHT-SIDED SCIATICA: ICD-10-CM

## 2024-09-27 ENCOUNTER — OFFICE VISIT (OUTPATIENT)
Dept: NEUROLOGY | Facility: CLINIC | Age: 44
End: 2024-09-27
Payer: COMMERCIAL

## 2024-09-27 VITALS
SYSTOLIC BLOOD PRESSURE: 127 MMHG | HEIGHT: 62 IN | WEIGHT: 202.19 LBS | BODY MASS INDEX: 37.21 KG/M2 | HEART RATE: 87 BPM | DIASTOLIC BLOOD PRESSURE: 85 MMHG

## 2024-09-27 DIAGNOSIS — G44.86 CERVICOGENIC HEADACHE: ICD-10-CM

## 2024-09-27 DIAGNOSIS — G43.711 CHRONIC MIGRAINE WITHOUT AURA, WITH INTRACTABLE MIGRAINE, SO STATED, WITH STATUS MIGRAINOSUS: Primary | ICD-10-CM

## 2024-09-27 DIAGNOSIS — M54.81 BILATERAL OCCIPITAL NEURALGIA: ICD-10-CM

## 2024-09-27 PROCEDURE — 3079F DIAST BP 80-89 MM HG: CPT | Mod: CPTII,S$GLB,,

## 2024-09-27 PROCEDURE — 3074F SYST BP LT 130 MM HG: CPT | Mod: CPTII,S$GLB,,

## 2024-09-27 PROCEDURE — 3044F HG A1C LEVEL LT 7.0%: CPT | Mod: CPTII,S$GLB,,

## 2024-09-27 PROCEDURE — 3008F BODY MASS INDEX DOCD: CPT | Mod: CPTII,S$GLB,,

## 2024-09-27 PROCEDURE — 99214 OFFICE O/P EST MOD 30 MIN: CPT | Mod: S$GLB,,,

## 2024-09-27 PROCEDURE — 1159F MED LIST DOCD IN RCRD: CPT | Mod: CPTII,S$GLB,,

## 2024-09-27 PROCEDURE — 99999 PR PBB SHADOW E&M-EST. PATIENT-LVL III: CPT | Mod: PBBFAC,,,

## 2024-09-27 RX ORDER — GABAPENTIN 300 MG/1
600 CAPSULE ORAL NIGHTLY
Qty: 180 CAPSULE | Refills: 3 | Status: SHIPPED | OUTPATIENT
Start: 2024-09-27

## 2024-09-27 RX ORDER — PROPRANOLOL HYDROCHLORIDE 40 MG/1
40 TABLET ORAL 2 TIMES DAILY
Qty: 180 TABLET | Refills: 3 | Status: SHIPPED | OUTPATIENT
Start: 2024-09-27

## 2024-09-27 RX ORDER — RIMEGEPANT SULFATE 75 MG/75MG
75 TABLET, ORALLY DISINTEGRATING ORAL DAILY PRN
Qty: 8 TABLET | Refills: 12 | Status: SHIPPED | OUTPATIENT
Start: 2024-09-27

## 2024-09-27 NOTE — PROGRESS NOTES
New Patient     SUBJECTIVE:  Patient ID: Silke Fulton   MRN: [id number]  Referred By: No ref. provider found  Chief Complaint: Headache      History of Present Illness:   44 y.o. female with chronic migraines, BON, cervicogenic headache, brain neoplasm - cerebellar astrocytoma, sciatica, who presents to clinic alone for evaluation of headaches.       Patient is a previous patient of Dr. Luther, transferring care to me for treatment of chronic migraines. Current regimen includes: propranolol 40mg BID, gabapentin HS, baclofen, Nurtec, Tizanidine 4mg. Find the baclofen works better than tizanidine       PMHx negative for TBI, Meningitis, Aneurysms, Kidney Stones, asthma, GI bleed, osteoporosis, CAD/MI, CVA/TIA, DM, cancer, pregnancy       Family Hx negative for Migraines     Headache History:  Onset - childhood - 1-years old  Previous Hx of HA -   Location/Radiation - occipitalis radiating to frontalis  Quality - pressure, sharp  Duration - 1/2day with medication - 1day  Intensity (range) - 4-10/10  Frequency - 4-8/30 ha days per month, none recently have been debilitating, once per month very severe  Triggers - weather, humidity, stress, hormones  Aggravating Factors - light, sound  Alleviating Factors - sleep  Recent Changes - improving overall  Prodrome/Aura - no  HA today? - yes, mild  Time of day of most headaches- anytime, usually in the morning  Sleep - no snoring, wakes feeling refreshed, resolution of headache with sleep    Associated symptoms with the headache:   Meningeal symptoms - photophobia, phonophobia, exercise intolerance   Nausea/vomiting - its been  a long time  Nasal drainage   Visual blurriness   Pallor/flushing  Dizziness   Vertigo  Confusion  Impaired concentration   Pain worsened with physical activity   Neck pain     Cluster headache symptoms:   Swollen or droopy eyelid  Swelling under or around the eye (may affect both eyes)  Excessive tearing  Red eye  Rhinorrhea or one-sided nasal  "congestion   Red, flushed face   Forehead and facial sweating    Symptoms of increased intracranial pressure:   Whooshing sounds   Visual spots/blotches     Basilar migraine symptoms:  Dysarthria  Vertigo  Tinnitus  Hypacusia  Diplopia  Simultaneous visual symptoms in both temporal and nasal fields of both eyes  Ataxia  Reduced level of consciousness  Bilateral paresthesias      Treatments Tried   Naproxen   Ketorolac  Imitrex  Maxalt   Amerge  Nurtec *  Baclofen   Propranolol   Gabapentin   Tizanidine       Social History  Alcohol - socially, 1-2glasses of wine/week  Smoke - denies  Recreational Drug Use- denies    Current Medications:    Current Outpatient Medications:     baclofen (LIORESAL) 10 MG tablet, Take 10 mg by mouth 2 (two) times daily., Disp: , Rfl:     cetirizine (ZYRTEC) 10 MG tablet, Take 10 mg by mouth once daily., Disp: , Rfl:     magnesium 250 mg Tab, Take 500 mg by mouth once daily. , Disp: , Rfl:     methylPREDNISolone (MEDROL DOSEPACK) 4 mg tablet, use as directed, Disp: 21 each, Rfl: 0    multivitamin (THERAGRAN) per tablet, Take 1 tablet by mouth once daily., Disp: , Rfl:     polycarbophil (FIBERCON) 625 mg tablet, Take 625 mg by mouth once daily., Disp: , Rfl:     gabapentin (NEURONTIN) 300 MG capsule, Take 2 capsules (600 mg total) by mouth every evening., Disp: 180 capsule, Rfl: 3    NURTEC 75 mg odt, Take 1 tablet (75 mg total) by mouth daily as needed for Migraine., Disp: 8 tablet, Rfl: 12    propranoloL (INDERAL) 40 MG tablet, Take 1 tablet (40 mg total) by mouth 2 (two) times daily., Disp: 180 tablet, Rfl: 3    Review of Systems - as per HPI, otherwise a balanced 10 systems review is negative.    OBJECTIVE:  Vitals:  /85   Pulse 87   Ht 5' 2" (1.575 m)   Wt 91.7 kg (202 lb 2.6 oz)   BMI 36.98 kg/m²     Physical Exam   Constitutional: she appears well-developed and well-nourished. she is well groomed. NAD  HENT:    Head: Normocephalic and atraumatic, Frontalis was NTTP, " temporalis was NTTP   Eyes: Conjunctivae and EOM are normal. Pupils are equal, round, and reactive to light   Neck: Neck supple. Occiput bilaterally +tinel sign. trapezius NTTP   Musculoskeletal: Normal range of motion. No joint stiffness. No vertebral point tenderness.  Skin: Skin is warm and dry.  Psychiatric: Normal mood and affect.     Neuro exam:    Mental status:  The patient is alert and oriented to person, place and time.  Language is intact and fluent  Remote and recent memory are intact  Normal attention and concentration  Mood is stable    Cranial Nerves:  Fundoscopic examination does not reveal any occult papilledema.    Pupils are equal and reactive to light.    Extraocular movements are intact and without nystagmus.    Visual fields are full to confrontation testing.   Facial movement is symmetric.  Facial sensation is intact.    Hearing is intact   FROM of neck in all (6) directions without pain  Shoulder shrug symmetrical.    Coordination:     Finger to nose - normal and symmetric bilaterally   Heel to shin test - normal and symmetric bilaterally     Motor:  Normal muscle bulk and symmetry. No fasciculations were noted.   Tremor not apparent   Pronator drift not apparent.    strength was strong and symmetric  Finger extension strength was strong and symmetric  RUE:appropriate against gravity and medium force as tested 5/5  LUE: appropriate against gravity and medium force as tested 5/5  RLE:appropriate against gravity and medium force as tested 5/5              LLE: appropriate against gravity and medium force as tested 5/5    Reflexes:  Right Brachioradialis 2+  Left Brachioradialis 2+  Right Biceps 2+  Left Biceps 2+  Right Patellar2+  Left Patellar 2+      Sensory:  RUE  intact light touch  LUE intact light touch  RLE intact light touch  LLE intact light touch    Gait:   Romberg - negative  Normal gait  Tandem, Heel, and Toe Walk - able to perform without difficulty    Review of Data:   Notes  from neurology reviewed   Labs:  No visits with results within 3 Month(s) from this visit.   Latest known visit with results is:   Lab Visit on 03/16/2024   Component Date Value Ref Range Status    Sodium 03/16/2024 139  136 - 145 mmol/L Final    Potassium 03/16/2024 4.5  3.5 - 5.1 mmol/L Final    Chloride 03/16/2024 107  95 - 110 mmol/L Final    CO2 03/16/2024 23  23 - 29 mmol/L Final    Glucose 03/16/2024 93  70 - 110 mg/dL Final    BUN 03/16/2024 11  6 - 20 mg/dL Final    Creatinine 03/16/2024 0.8  0.5 - 1.4 mg/dL Final    Calcium 03/16/2024 9.2  8.7 - 10.5 mg/dL Final    Total Protein 03/16/2024 6.8  6.0 - 8.4 g/dL Final    Albumin 03/16/2024 3.7  3.5 - 5.2 g/dL Final    Total Bilirubin 03/16/2024 0.3  0.1 - 1.0 mg/dL Final    Alkaline Phosphatase 03/16/2024 73  55 - 135 U/L Final    AST 03/16/2024 20  10 - 40 U/L Final    ALT 03/16/2024 21  10 - 44 U/L Final    eGFR 03/16/2024 >60.0  >60 mL/min/1.73 m^2 Final    Anion Gap 03/16/2024 9  8 - 16 mmol/L Final    WBC 03/16/2024 7.25  3.90 - 12.70 K/uL Final    RBC 03/16/2024 4.80  4.00 - 5.40 M/uL Final    Hemoglobin 03/16/2024 14.0  12.0 - 16.0 g/dL Final    Hematocrit 03/16/2024 44.1  37.0 - 48.5 % Final    MCV 03/16/2024 92  82 - 98 fL Final    MCH 03/16/2024 29.2  27.0 - 31.0 pg Final    MCHC 03/16/2024 31.7 (L)  32.0 - 36.0 g/dL Final    RDW 03/16/2024 13.3  11.5 - 14.5 % Final    Platelets 03/16/2024 184  150 - 450 K/uL Final    MPV 03/16/2024 9.9  9.2 - 12.9 fL Final    Immature Granulocytes 03/16/2024 0.3  0.0 - 0.5 % Final    Gran # (ANC) 03/16/2024 3.9  1.8 - 7.7 K/uL Final    Immature Grans (Abs) 03/16/2024 0.02  0.00 - 0.04 K/uL Final    Lymph # 03/16/2024 2.4  1.0 - 4.8 K/uL Final    Mono # 03/16/2024 0.7  0.3 - 1.0 K/uL Final    Eos # 03/16/2024 0.2  0.0 - 0.5 K/uL Final    Baso # 03/16/2024 0.05  0.00 - 0.20 K/uL Final    nRBC 03/16/2024 0  0 /100 WBC Final    Gran % 03/16/2024 54.3  38.0 - 73.0 % Final    Lymph % 03/16/2024 32.4  18.0 - 48.0 % Final     Mono % 03/16/2024 9.1  4.0 - 15.0 % Final    Eosinophil % 03/16/2024 3.2  0.0 - 8.0 % Final    Basophil % 03/16/2024 0.7  0.0 - 1.9 % Final    Differential Method 03/16/2024 Automated   Final    Cholesterol 03/16/2024 156  120 - 199 mg/dL Final    Triglycerides 03/16/2024 104  30 - 150 mg/dL Final    HDL 03/16/2024 22 (L)  40 - 75 mg/dL Final    LDL Cholesterol 03/16/2024 113.2  63.0 - 159.0 mg/dL Final    HDL/Cholesterol Ratio 03/16/2024 14.1 (L)  20.0 - 50.0 % Final    Total Cholesterol/HDL Ratio 03/16/2024 7.1 (H)  2.0 - 5.0 Final    Non-HDL Cholesterol 03/16/2024 134  mg/dL Final    Hemoglobin A1C 03/16/2024 5.1  4.0 - 5.6 % Final    Estimated Avg Glucose 03/16/2024 100  68 - 131 mg/dL Final    TSH 03/16/2024 2.252  0.400 - 4.000 uIU/mL Final    Free T4 03/16/2024 0.90  0.71 - 1.51 ng/dL Final    HIV 1/2 Ag/Ab 03/16/2024 Non-reactive  Non-reactive Final    Hepatitis C Ab 03/16/2024 Non-reactive  Non-reactive Final     Imaging:  Results for orders placed or performed during the hospital encounter of 12/22/17   MRI Brain W WO Contrast    Narrative    Procedure: MRI the brain with andwithout contrast.    Technique: Sagittal and axial T1, axial T2, axial FLAIR, axial gradient, axial diffusion, and axial, sagittal, and coronal postcontrast T1 images of the whole brain. 8  ml of Gadavist injected intravenously.         Comparison: 09/15/2015    Findings: There is remote operative change from suboccipital craniotomy for parasagittal posterior fossa lesion resection there is continued encephalomalacia of the cerebellum with subtle margin of T2 flair signal hyperintensity in the adjacent parenchyma suggestive for gliosis and scarring. There is no evidence for new prior focal signal abnormality or enhancement to suggest worsening residual or recurrent lesion.    The ventricles are stable without hydrocephalus. There is mild prominence of the posterior horn the lateral ventricles symmetrically which may be sequela of  prior hydrocephalus.    Punctate foci susceptibility posterior fossa suggestive for remote blood products primarily along the margin of the fourth ventricular resection cavity. Measured cranial T2 flow voids are present.    Impression     Remote operative change from suboccipital craniotomy with continued right cerebellar encephalomalacia. Continued and relatively stable subtle flair hyperintensity margin of the cerebellum suggestive for gliosis and scarring. No evidence for new signal abnormality or enhancement to suggest residual or recurrent mass.    Stable configuration of ventricles with mild prominence of the posterior horns lateral ventricles and moderate prominence of the fourth ventricle.    Please note there is mild mucosal thickening and probable small aerated secretions within the left maxillary antra cannot exclude component of acute sinusitis.        .      Electronically signed by: SHELBY CUBA DO  Date:     12/22/17  Time:    12:17    Results for orders placed or performed during the hospital encounter of 08/04/14   CT Head Without Contrast    Narrative    Comparison is made to a cranial CT exam dated 3/15/04, as well as to a more recent cranial MR exam of 12/10/07.    Postoperative changes are again identified in the posterior fossa, with an occipital calvarial defect observed and with a sizeable area of hypoattenuation representing local porencephaly/gliotic scarring seen within the superomedial aspect of the right   cerebellar hemisphere/superior cerebellar vermis.  This area of encephalomalacia is unchanged from the examinations noted above.  There is some associated prominence of the CSF spaces over the right cerebellar hemisphere, as before.  There is moderate   enlargement of both lateral ventricles and enlargement of the fourth ventricle identified, but the size of the ventricular system is unchanged from the previous examinations, and the ventricles are normal in position without midline  shift.  Well defined,   normal appearing sulci are identified over both cerebral convexities.  Parenchymal brain attenuation demonstrates no detrimental interval change since the 2004 CT exam.  No evidence of recent intracranial hemorrhage.  No new areas of hypoattenuation   indicating recent cerebral ischemia/infarction.  No subdural collections.  No findings on the noncontrast cranial CT to specifically suggest recurrent neoplasm.  Incidental note is made of mucosal thickening in the posterior ethmoid air cells on the   right side.    Impression     Noncontrast cranial CT examination demonstrates postoperative changes related to the posterior fossa as discussed above.  There has been no significant detrimental interval change since the cranial CT of 3/15/04.           Electronically signed by: Avi Rivas MD  Date:     08/05/14  Time:    05:26      Note: I have independently reviewed any/all imaging/labs/tests and agree with the report (s) as documented.  Any discrepancies will be as noted/demarcated by free text.  ROBE, ANALISA-LAMONT 9/27/2024    ASSESSMENT:  1. Chronic migraine without aura, with intractable migraine, so stated, with status migrainosus    2. Cervicogenic headache    3. Bilateral occipital neuralgia          PLAN:  - Discussed symptoms appear to be consistent with migraine c/b cervicalgia and BON, discussed treatment options and patient agreed with the following plan:    - prevention: continue propranolol 40mg BID and gabapentin HS  - rescue: nurtec prn. Medically necessary as patient has tried and failed several other medications (see above). Can also do baclofen as needed  - pt to document any new symptoms she is experiencing or noticed by family members to be discussed at next appointment. Can consider imaging if any red flags are present as MRI not indicated for routine monitoring of migraines.   - risks, benefits, and potential side effects of propranolol, gabapentin, nurtec discussed   - alternative  treatment options offered   - importance of healthy diet, regular exercise and sleep hygiene in the treatment of headaches    - Start tracking headaches via Migraine Buddy karel on phone   - RTC in 3 months     Orders Placed This Encounter    propranoloL (INDERAL) 40 MG tablet    NURTEC 75 mg odt    gabapentin (NEURONTIN) 300 MG capsule       I have discussed realistic goals of care with patient at length as well as medication options, and need for lifestyle adjustment. I have explained that treatment will take time. We have agreed that the goal will be to reduce frequency/intensity/quantity of HA, not to be completely HA free. I have explained my non narcotic policy regarding headache treatment.    Patient agreeable to work on lifestyle adjustments.    Discussed potential for teratogenicity with treatment, patient understands if her family planning status should change she will contact office immediately and we will safely adjust medications as needed.     Questions and concerns were sought and answered to the patient's stated verbal satisfaction.  The patient verbalizes understanding and agreement with the above stated treatment plan.     CC: Tesfaye Bennett MD Monique Smith, FNP-C  [facility]  [phone]    Dr. Muro was available during today's encounter.     I spent a total of 31 minutes on the day of the visit.  This includes face to face time and non-face to face time preparing to see the patient (eg, review of tests), obtaining and/or reviewing separately obtained history, documenting clinical information in the electronic or other health record, independently interpreting results and communicating results to the patient/family/caregiver, or care coordinator.

## 2024-10-23 NOTE — PROGRESS NOTES
DATE: 10/23/2024  PATIENT: Silke Fulton    Supervising Physician: Kenyon Flood M.D.    CHIEF COMPLAINT: low back and bilateral leg pain    HISTORY:  Silke Fulton is a 44 y.o. female with a pmhx of brain malignant neoplasm here for initial evaluation of low back and bilateral (R>L) leg pain (Back - 7, Leg - 7).  The pain in the right leg is what bothers her most.  The pain has been present for a year, worsening over time. The patient describes the pain as shooting.  The pain is worse with activity and improved by rest. There is mild associated numbness and tingling. There is negative subjective weakness. Prior treatments have included PT, but no ESIs or surgery.    The patient denies myelopathic symptoms such as handwriting changes or difficulty with buttons/coins/keys. Denies perineal paresthesias, bowel/bladder dysfunction.    PAST MEDICAL/SURGICAL HISTORY:  Past Medical History:   Diagnosis Date    Acute gastritis without mention of hemorrhage     Esophageal reflux     Meningitis     staph aureus    Migraine without aura, without mention of intractable migraine without mention of status migrainosus     Personal history of malignant neoplasm of brain     cerebellar astrocytoma    Tarsal tunnel syndrome      Past Surgical History:   Procedure Laterality Date    cerebellar astrocytoma      eye-superior oblique palsy      right mastoidectomy         Medications:   Current Outpatient Medications on File Prior to Visit   Medication Sig Dispense Refill    baclofen (LIORESAL) 10 MG tablet Take 10 mg by mouth 2 (two) times daily.      cetirizine (ZYRTEC) 10 MG tablet Take 10 mg by mouth once daily.      gabapentin (NEURONTIN) 300 MG capsule Take 2 capsules (600 mg total) by mouth every evening. 180 capsule 3    magnesium 250 mg Tab Take 500 mg by mouth once daily.       multivitamin (THERAGRAN) per tablet Take 1 tablet by mouth once daily.      NURTEC 75 mg odt Take 1 tablet (75 mg total) by mouth  daily as needed for Migraine. 8 tablet 12    polycarbophil (FIBERCON) 625 mg tablet Take 625 mg by mouth once daily.      propranoloL (INDERAL) 40 MG tablet Take 1 tablet (40 mg total) by mouth 2 (two) times daily. 180 tablet 3     No current facility-administered medications on file prior to visit.       Social History:   Social History     Socioeconomic History    Marital status:     Number of children: 0    Years of education: 20   Occupational History    Occupation: Call Center Counsellor     Employer: [employer]   Tobacco Use    Smoking status: Never     Passive exposure: Never    Smokeless tobacco: Never   Substance and Sexual Activity    Alcohol use: Yes     Alcohol/week: 2.0 standard drinks of alcohol     Types: 2 Standard drinks or equivalent per week     Comment: socially    Drug use: No    Sexual activity: Yes     Partners: Male     Birth control/protection: None   Social History Narrative           REVIEW OF SYSTEMS:  Constitution: Negative. Negative for chills, fever and night sweats.   Cardiovascular: Negative for chest pain and syncope.   Respiratory: Negative for cough and shortness of breath.   Gastrointestinal: See HPI. Negative for nausea/vomiting. Negative for abdominal pain.  Genitourinary: See HPI. Negative for discoloration or dysuria.  Skin: Negative for dry skin, itching and rash.   Hematologic/Lymphatic: Negative for bleeding problem. Does not bruise/bleed easily.   Musculoskeletal: Negative for falls and muscle weakness.   Neurological: See HPI. No seizures.   Endocrine: Negative for polydipsia, polyphagia and polyuria.   Allergic/Immunologic: Negative for hives and persistent infections.     EXAM:  There were no vitals taken for this visit.    General: The patient is a very pleasant 44 y.o. female in no apparent distress, the patient is oriented to person, place and time.  Psych: Normal mood and affect  HEENT: Vision grossly intact, hearing intact to the spoken word.  Lungs:  Respirations unlabored.  Gait: Normal station and gait, no difficulty with toe or heel walk.   Skin: Dorsal lumbar skin negative for rashes, lesions, hairy patches and surgical scars. There is mild lumbar tenderness to palpation.  Range of motion: Lumbar range of motion is acceptable.  Spinal Balance: Global saggital and coronal spinal balance acceptable, not significant for scoliosis and kyphosis.  Musculoskeletal: No pain with the range of motion of the bilateral hips. No trochanteric tenderness to palpation.  Vascular: Bilateral lower extremities warm and well perfused, dorsalis pedis pulses 2+ bilaterally.  Neurological: Normal strength and tone in all major motor groups in the bilateral lower extremities. Normal sensation to light touch in the L2-S1 dermatomes bilaterally.  Deep tendon reflexes symmetric 2+ in the bilateral lower extremities.  Negative Babinski bilaterally. Straight leg raise negative bilaterally.    IMAGING:      Today I personally reviewed AP, Lat and Flex/Ex  upright L-spine films that demonstrate grade I anterolisthesis of L4 on L5.     MRI lumbar demonstrates bilateral facet arthropathy and bilateral facet synovial cysts contributing to severe spinal canal stenosis at L4-5     There is no height or weight on file to calculate BMI.    Hemoglobin A1C   Date Value Ref Range Status   03/16/2024 5.1 4.0 - 5.6 % Final     Comment:     ADA Screening Guidelines:  5.7-6.4%  Consistent with prediabetes  >or=6.5%  Consistent with diabetes    High levels of fetal hemoglobin interfere with the HbA1C  assay. Heterozygous hemoglobin variants (HbS, HgC, etc)do  not significantly interfere with this assay.   However, presence of multiple variants may affect accuracy.     08/19/2015 5.2 4.5 - 6.2 % Final           ASSESSMENT/PLAN:    There are no diagnoses linked to this encounter.    Today we discussed at length all of the different treatment options including anti-inflammatories, acetaminophen, rest, ice,  heat, physical therapy including strengthening and stretching exercises, home exercises, ROM, aerobic conditioning, aqua therapy, other modalities including ultrasound, massage, and dry needling, epidural steroid injections and finally surgical intervention.      Pt presents with chronic lumbar radiculopathy. Failure of conservative rx. Will order bilateral L4-5 TFESI (with possible synovial cyst rupture). Pt will fu if pain persists.

## 2024-10-24 ENCOUNTER — OFFICE VISIT (OUTPATIENT)
Dept: ORTHOPEDICS | Facility: CLINIC | Age: 44
End: 2024-10-24
Payer: COMMERCIAL

## 2024-10-24 VITALS — BODY MASS INDEX: 37.21 KG/M2 | WEIGHT: 202.19 LBS | HEIGHT: 62 IN

## 2024-10-24 DIAGNOSIS — G89.29 CHRONIC BILATERAL LOW BACK PAIN WITH RIGHT-SIDED SCIATICA: ICD-10-CM

## 2024-10-24 DIAGNOSIS — M48.00 CENTRAL STENOSIS OF SPINAL CANAL: ICD-10-CM

## 2024-10-24 DIAGNOSIS — M54.41 CHRONIC BILATERAL LOW BACK PAIN WITH RIGHT-SIDED SCIATICA: ICD-10-CM

## 2024-10-24 PROCEDURE — 99204 OFFICE O/P NEW MOD 45 MIN: CPT | Mod: S$GLB,,, | Performed by: ORTHOPAEDIC SURGERY

## 2024-10-24 PROCEDURE — 1159F MED LIST DOCD IN RCRD: CPT | Mod: CPTII,S$GLB,, | Performed by: ORTHOPAEDIC SURGERY

## 2024-10-24 PROCEDURE — 3008F BODY MASS INDEX DOCD: CPT | Mod: CPTII,S$GLB,, | Performed by: ORTHOPAEDIC SURGERY

## 2024-10-24 PROCEDURE — 3044F HG A1C LEVEL LT 7.0%: CPT | Mod: CPTII,S$GLB,, | Performed by: ORTHOPAEDIC SURGERY

## 2024-10-24 PROCEDURE — 99999 PR PBB SHADOW E&M-EST. PATIENT-LVL III: CPT | Mod: PBBFAC,,, | Performed by: ORTHOPAEDIC SURGERY

## 2024-10-25 ENCOUNTER — TELEPHONE (OUTPATIENT)
Dept: PAIN MEDICINE | Facility: CLINIC | Age: 44
End: 2024-10-25
Payer: COMMERCIAL

## 2024-10-28 ENCOUNTER — TELEPHONE (OUTPATIENT)
Dept: PAIN MEDICINE | Facility: CLINIC | Age: 44
End: 2024-10-28
Payer: COMMERCIAL

## 2024-11-04 ENCOUNTER — TELEPHONE (OUTPATIENT)
Dept: PAIN MEDICINE | Facility: CLINIC | Age: 44
End: 2024-11-04
Payer: COMMERCIAL

## 2024-11-04 DIAGNOSIS — M54.41 CHRONIC BILATERAL LOW BACK PAIN WITH RIGHT-SIDED SCIATICA: Primary | ICD-10-CM

## 2024-11-04 DIAGNOSIS — G89.29 CHRONIC BILATERAL LOW BACK PAIN WITH RIGHT-SIDED SCIATICA: Primary | ICD-10-CM

## 2024-11-04 NOTE — TELEPHONE ENCOUNTER
----- Message from Lenore sent at 11/4/2024  3:20 PM CST -----  Regarding: t advice p  Contact: pt@ [phone]  Pt is returning a missed call from someone in the office and is asking for a return call back soon. Thanks.     Reason for call:miss call      Patient's DX:     Patient requesting call back or [facility] msg: pt@ [phone]

## 2024-11-06 ENCOUNTER — OFFICE VISIT (OUTPATIENT)
Dept: INTERNAL MEDICINE | Facility: CLINIC | Age: 44
End: 2024-11-06
Payer: COMMERCIAL

## 2024-11-06 VITALS
HEART RATE: 78 BPM | DIASTOLIC BLOOD PRESSURE: 82 MMHG | BODY MASS INDEX: 37.08 KG/M2 | HEIGHT: 62 IN | SYSTOLIC BLOOD PRESSURE: 112 MMHG | OXYGEN SATURATION: 97 % | WEIGHT: 201.5 LBS

## 2024-11-06 DIAGNOSIS — M79.89 LEFT LEG SWELLING: Primary | ICD-10-CM

## 2024-11-06 PROCEDURE — 1159F MED LIST DOCD IN RCRD: CPT | Mod: CPTII,S$GLB,, | Performed by: FAMILY MEDICINE

## 2024-11-06 PROCEDURE — 99999 PR PBB SHADOW E&M-EST. PATIENT-LVL IV: CPT | Mod: PBBFAC,,, | Performed by: FAMILY MEDICINE

## 2024-11-06 PROCEDURE — 3008F BODY MASS INDEX DOCD: CPT | Mod: CPTII,S$GLB,, | Performed by: FAMILY MEDICINE

## 2024-11-06 PROCEDURE — 1160F RVW MEDS BY RX/DR IN RCRD: CPT | Mod: CPTII,S$GLB,, | Performed by: FAMILY MEDICINE

## 2024-11-06 PROCEDURE — 3079F DIAST BP 80-89 MM HG: CPT | Mod: CPTII,S$GLB,, | Performed by: FAMILY MEDICINE

## 2024-11-06 PROCEDURE — 3074F SYST BP LT 130 MM HG: CPT | Mod: CPTII,S$GLB,, | Performed by: FAMILY MEDICINE

## 2024-11-06 PROCEDURE — 3044F HG A1C LEVEL LT 7.0%: CPT | Mod: CPTII,S$GLB,, | Performed by: FAMILY MEDICINE

## 2024-11-06 PROCEDURE — 99214 OFFICE O/P EST MOD 30 MIN: CPT | Mod: S$GLB,,, | Performed by: FAMILY MEDICINE

## 2024-11-06 RX ORDER — FUROSEMIDE 20 MG/1
20 TABLET ORAL 2 TIMES DAILY
Qty: 60 TABLET | Refills: 0 | Status: SHIPPED | OUTPATIENT
Start: 2024-11-06 | End: 2024-12-06

## 2024-11-06 NOTE — PROGRESS NOTES
Subjective:     Patient ID: Silke Fulton is a 44 y.o. female.   Chief Complaint: Follow-up, Leg Swelling, Low-back Pain, and Headache (Pain scale 4)    HPI:  History of Present Illness    CHIEF COMPLAINT:  Patient presents for a follow-up visit regarding back pain and to discuss MRI results from a previous visit.    HPI:  Patient reports persistent back pain since her last visit 1 month ago. Patient subsequently consulted with a back specialist and had a conversation with a pain clinic. An appointment has been scheduled for December 5th for a procedure (LESI), with the exact time to be determined.    The MRI results reveal bulging discs in the mid and lower lumbar spine, with slight anterior displacement of the spinal vertebrae. There is also an exposed disc and a right-side synovial cyst, which may potentially cause nerve compression and pain. At the inferior aspect of the spine, there is a left-sided bulge that narrows the space and can cause radicular pain down the leg, consistent with the patient's reported symptoms.    Patient describes the consultation with the specialist as rapid and somewhat overwhelming. The specialist discussed the possibility of injections to reduce the disc bulge, with surgery as a potential option if conservative treatment fails. Patient expresses concern about the lengthy 6-month rehabilitation period associated with surgery.    Patient also reports leg edema present for approximately 10 years. The left leg is more edematous than the right, with recent worsening. Patient's mother frequently comments on the severity of the leg edema. Patient was prescribed Lasix for this issue in 2014, but states it was ineffective at that time. Denies any redness or pain. Previous workup for swelling including DVT ultrasound was negative for specific etiology.          Review of Systems   Constitutional:  Negative for chills and fever.   HENT:  Negative for nasal congestion, ear pain and  "sore throat.    Eyes:  Negative for visual disturbance.   Respiratory:  Negative for cough and shortness of breath.    Cardiovascular:  Positive for leg swelling. Negative for chest pain.   Gastrointestinal:  Negative for constipation, diarrhea, nausea and vomiting.   Genitourinary:  Negative for dysuria and frequency.   Musculoskeletal:  Positive for arthralgias, back pain and leg pain.   Integumentary:  Negative for rash.   Neurological:  Negative for headaches.   Psychiatric/Behavioral:  Negative for suicidal ideas.    All other systems reviewed and are negative.         Objective:      Vitals:    24 1623   BP: 112/82   BP Location: Left arm   Patient Position: Sitting   Pulse: 78   SpO2: 97%   Weight: 91.4 kg (201 lb 8 oz)   Height: 5' 2" (1.575 m)      Physical Exam  Constitutional:       General: She is not in acute distress.     Appearance: Normal appearance. She is not ill-appearing.   Musculoskeletal:      Cervical back: Normal. No tenderness or bony tenderness. Normal range of motion.      Thoracic back: Normal. No tenderness or bony tenderness. Normal range of motion.      Lumbar back: Normal. No tenderness or bony tenderness. Normal range of motion. Negative right straight leg raise test and negative left straight leg raise test.      Right lower leg: No edema.      Left lower le+ Edema present.   Neurological:      General: No focal deficit present.      Mental Status: She is alert and oriented to person, place, and time. Mental status is at baseline.      Cranial Nerves: No dysarthria.   Psychiatric:         Mood and Affect: Mood normal.         Behavior: Behavior normal.         Thought Content: Thought content normal.         Judgment: Judgment normal.           Assessment/Plan:             ICD-10-CM ICD-9-CM   1. Left leg swelling  M79.89 729.81     Orders Placed This Encounter   Procedures    US Lower Extremity Veins Bilateral       Assessment & Plan    - Reviewed MRI results  - Assessed " that patient's condition is likely beyond scope of physical therapy alone  - Evaluated left leg swelling, noting minimal edema but distinct difference in circumference  - Ordered left leg ultrasound to rule out blood clot, given worsening     SPINAL ISSUES:  - Explained MRI findings using spine model, including disc bulges and narrowing of nerve spaces.  - Discussed potential treatment options, including injections and nerve ablation.  - Clarified that spinal taps likely did not contribute to current back issues - which was a question she had    LEG SWELLING:  - Started Lasix 20mg daily for leg swelling.  - Increase to twice daily after 1 week if no improvement noted.  - Do not exceed 1 tablet twice daily.  - Contact the office to monitor effectiveness of Lasix for leg swelling after 2 to 4 weeks    DEEP VEIN THROMBOSIS (DVT) SCREENING:  - Ultrasound of leg ordered to rule out blood clot.              No follow-ups on file.     Tesfaye Bennett MD, [facility]  Family Medicine Physician  [facility]  11/06/2024      This note was generated with the assistance of ambient listening technology. Verbal consent was obtained by the patient and accompanying visitor(s) for the recording of patient appointment to facilitate this note. I attest to having reviewed and edited the generated note for accuracy, though some syntax or spelling errors may persist. Please contact the author of this note for any clarification.

## 2024-11-11 ENCOUNTER — HOSPITAL ENCOUNTER (OUTPATIENT)
Dept: RADIOLOGY | Facility: HOSPITAL | Age: 44
Discharge: HOME | End: 2024-11-11
Attending: FAMILY MEDICINE
Payer: COMMERCIAL

## 2024-11-11 DIAGNOSIS — M79.89 LEFT LEG SWELLING: ICD-10-CM

## 2024-11-11 PROCEDURE — 93970 EXTREMITY STUDY: CPT | Mod: TC

## 2024-11-11 PROCEDURE — 93970 EXTREMITY STUDY: CPT | Mod: 26,,, | Performed by: RADIOLOGY

## 2024-11-22 ENCOUNTER — PATIENT MESSAGE (OUTPATIENT)
Dept: INTERNAL MEDICINE | Facility: CLINIC | Age: 44
End: 2024-11-22
Payer: COMMERCIAL

## 2024-11-29 ENCOUNTER — TELEPHONE (OUTPATIENT)
Dept: PAIN MEDICINE | Facility: CLINIC | Age: 44
End: 2024-11-29
Payer: COMMERCIAL

## 2024-12-03 NOTE — PRE-PROCEDURE INSTRUCTIONS
Unable to reach pt via phone.  Left voicemail with arrival time also informing pt of need for responsible  accompaniment and instructing pt to follow pre-procedure instructions provided via [facility] portal.  The following message was sent to pt's portal.        Dear Silke,     Please read over the following pre-procedure instructions in it's entirety as there is helpful information here to get you well prepared for your upcoming procedure.     You are scheduled for a procedure with Dr. Cowan on 12/5/2024.     [facility] at the corner of [facility] and [facility]. It is in the [place] next to [employer]. The address is: [address] [place] [address]. Take the elevator to the 2nd floor.       Registration check in time: 7:45 am  Procedure scheduled for time: 8:45 am     If you are receiving sedation, you CANNOT drive yourself and must have a responsible friend or family member (no rideshare) to drive you home.        You should take any medications that you routinely take for blood pressure, heart medications, thyroid, cholesterol, etc.      The fasting restrictions are dependent on whether or not you are receiving sedation. Sedation is not available for all procedures.      Your fasting instructions/Sedation type are as follow:  IV sedation.   Nothing to eat after midnight the night prior to procedure.   Patients are encouraged to consume clear liquids up to 2 hours prior to scheduled arrival time.  -Clear liquids include Gatorade, water, soda, black coffee or tea (no milk or creamer), and clear juices. - Clear liquids do NOT include anything with pulp or food particles (chicken broth, ice cream, yogurt, Jello, etc.) You CANNOT drive yourself and must have a .           If you are on blood thinners, you need to follow the anticoagulation instructions that had been discussed previously. You should only stop the blood thinners if it was approved by your primary care  physician or your cardiologist. In the event that you are not able to stop your blood thinners, a blood thinner was not listed on your medication list, or we were not able to get clearance from your cardiologist, then the procedure may have to be postponed/canceled.      IF you were told to stop your blood thinners, this is how long you should generally hold some of the more common ones. Remember that stopping blood thinners is only necessary for certain procedures. If you are unsure of your instructions, please call us.   Aspirin - 5 days  Plavix/Clopidogrel - 7 days  Warfarin / Coumadin - 5 days  Eliquis - 3 days  Pradaxa/Dabigatran - 4 days  Xarelto/Rivaroxaban - 3 days     *If you take Ozempic, Trulicity, Mounjaro, Rybelsus, Zepbound Or other weight loss, non-insulin injections you must hold this for one week (7 days) prior if you are having IV sedation.      If you are a diabetic, do not take your medication if you will be fasting, but bring it with you. Please plan on being here for roughly 2-3 hours.      Please call us if any of the following have occurred:  *running fever or having any flu-like symptoms  *have been taking antibiotics in the past 2 weeks  *have had any or plan on having any immunizations in the 2 weeks before or after your injection(Flu/Shingles/Covid Booster/Pneumonia, etc.)  *had any out patient procedures other than with us in the past 2 weeks (Colonoscopy, Endoscopy, Biopsy, OBGYN, Dental, etc.) Or received a steroid injection from another provider within the last 2 weeks.  *have any wounds or rashes  *awaiting ANY test results that could result in you having to take antibiotics (Urine Culture, Flu/Covid/Strept Swab, etc)     If you have been COVID positive, you will need to hold off on your procedure until you are symptom free for 10 days. If you did not have any symptoms, you can have your procedure 10 days from your positive test result.      On the morning of your procedure:  *HOLD  ALL VITAMINS, MINERALS, HERBS (INCLUDING HERBAL TEAS) AND SUPPLEMENTS  *SHOWER WITH ANTIBACTERIAL SOAP (example: DIAL over the counter) NIGHT BEFORE AND MORNING OF PROCEDURE  *DO NOT APPLY ANY LOTIONS, OILS, POWDERS, PERFUME/COLOGNE, OINTMENTS, GELS, CREAMS, MAKEUP OR DEODORANT TO YOUR SKIN MORNING OF PROCEDURE  *LEAVE JEWELRY AND ANY VALUABLES AT HOME  *WEAR LOOSE COMFORTABLE CLOTHING      If you have any questions please call [phone].    Please reply to this portal message as receipt of delivery.     Thank you,  [facility]  [place], LPN [facility]  Pre-Admit

## 2024-12-05 ENCOUNTER — HOSPITAL ENCOUNTER (OUTPATIENT)
Facility: HOSPITAL | Age: 44
Discharge: HOME | End: 2024-12-05
Attending: EMERGENCY MEDICINE | Admitting: EMERGENCY MEDICINE
Payer: COMMERCIAL

## 2024-12-05 VITALS
DIASTOLIC BLOOD PRESSURE: 74 MMHG | TEMPERATURE: 98 F | BODY MASS INDEX: 36.8 KG/M2 | SYSTOLIC BLOOD PRESSURE: 135 MMHG | HEIGHT: 62 IN | RESPIRATION RATE: 16 BRPM | OXYGEN SATURATION: 97 % | HEART RATE: 87 BPM | WEIGHT: 200 LBS

## 2024-12-05 DIAGNOSIS — G89.29 CHRONIC PAIN: ICD-10-CM

## 2024-12-05 LAB
B-HCG UR QL: NEGATIVE
CTP QC/QA: YES

## 2024-12-05 PROCEDURE — 99152 MOD SED SAME PHYS/QHP 5/>YRS: CPT | Mod: ,,, | Performed by: EMERGENCY MEDICINE

## 2024-12-05 PROCEDURE — 25500020 PHARM REV CODE 255: Performed by: EMERGENCY MEDICINE

## 2024-12-05 PROCEDURE — 64999 UNLISTED PX NERVOUS SYSTEM: CPT | Mod: ,,, | Performed by: EMERGENCY MEDICINE

## 2024-12-05 PROCEDURE — 81025 URINE PREGNANCY TEST: CPT | Performed by: EMERGENCY MEDICINE

## 2024-12-05 PROCEDURE — 64483 NJX AA&/STRD TFRM EPI L/S 1: CPT | Mod: 50 | Performed by: EMERGENCY MEDICINE

## 2024-12-05 PROCEDURE — 64483 NJX AA&/STRD TFRM EPI L/S 1: CPT | Mod: 50,,, | Performed by: EMERGENCY MEDICINE

## 2024-12-05 PROCEDURE — 64999 UNLISTED PX NERVOUS SYSTEM: CPT | Performed by: EMERGENCY MEDICINE

## 2024-12-05 PROCEDURE — 63600175 PHARM REV CODE 636 W HCPCS: Performed by: EMERGENCY MEDICINE

## 2024-12-05 PROCEDURE — 99152 MOD SED SAME PHYS/QHP 5/>YRS: CPT | Performed by: EMERGENCY MEDICINE

## 2024-12-05 RX ORDER — DEXAMETHASONE SODIUM PHOSPHATE 10 MG/ML
INJECTION INTRAMUSCULAR; INTRAVENOUS
Status: DISCONTINUED | OUTPATIENT
Start: 2024-12-05 | End: 2024-12-05 | Stop reason: HOSPADM

## 2024-12-05 RX ORDER — LIDOCAINE HYDROCHLORIDE 10 MG/ML
INJECTION, SOLUTION INFILTRATION; PERINEURAL
Status: DISCONTINUED | OUTPATIENT
Start: 2024-12-05 | End: 2024-12-05 | Stop reason: HOSPADM

## 2024-12-05 RX ORDER — FENTANYL CITRATE 50 UG/ML
INJECTION, SOLUTION INTRAMUSCULAR; INTRAVENOUS
Status: DISCONTINUED | OUTPATIENT
Start: 2024-12-05 | End: 2024-12-05 | Stop reason: HOSPADM

## 2024-12-05 RX ORDER — LIDOCAINE HYDROCHLORIDE 20 MG/ML
INJECTION, SOLUTION EPIDURAL; INFILTRATION; INTRACAUDAL; PERINEURAL
Status: DISCONTINUED | OUTPATIENT
Start: 2024-12-05 | End: 2024-12-05 | Stop reason: HOSPADM

## 2024-12-05 RX ORDER — MIDAZOLAM HYDROCHLORIDE 1 MG/ML
INJECTION, SOLUTION INTRAMUSCULAR; INTRAVENOUS
Status: DISCONTINUED | OUTPATIENT
Start: 2024-12-05 | End: 2024-12-05 | Stop reason: HOSPADM

## 2024-12-05 NOTE — H&P
HPI  Patient presenting for Procedure(s) (LRB):  TFESI B/L L4-5 (possible synovial cyst rupture) (Bilateral)     No health changes since previous encounter    Past Medical History:   Diagnosis Date    Acute gastritis without mention of hemorrhage     Esophageal reflux     Meningitis     staph aureus    Migraine without aura, without mention of intractable migraine without mention of status migrainosus     Personal history of malignant neoplasm of brain     cerebellar astrocytoma    Tarsal tunnel syndrome      Past Surgical History:   Procedure Laterality Date    cerebellar astrocytoma      eye-superior oblique palsy      right mastoidectomy       Review of patient's allergies indicates:   Allergen Reactions    Septra [sulfamethoxazole-trimethoprim] Other (See Comments)    Sulfa (sulfonamide antibiotics)      Bone marrow suppression        No current facility-administered medications for this encounter.       PMHx, PSHx, Allergies, Medications reviewed in epic    ROS negative except pain complaints in HPI    OBJECTIVE:    Breastfeeding No     PHYSICAL EXAMINATION:    GENERAL: Well appearing, in no acute distress, alert and oriented x3.  PSYCH:  Mood and affect appropriate.  SKIN: Skin color, texture, turgor normal, no rashes or lesions which will impact the procedure.  CV: RRR with palpation of the radial artery.  PULM: No evidence of respiratory difficulty, symmetric chest rise. Clear to auscultation.  NEURO: Cranial nerves grossly intact.    Plan:    Proceed with procedure as planned Procedure(s) (LRB):  TFESI B/L L4-5 (possible synovial cyst rupture) (Bilateral)    Chano Cowan  12/05/2024

## 2024-12-05 NOTE — PLAN OF CARE
Silke Fulton has met all discharge criteria from Phase II. Vital Signs are stable, ambulating  without difficulty. Discharge instructions given, patient verbalized understanding. Discharged from facility via wheelchair in stable condition.

## 2024-12-05 NOTE — DISCHARGE INSTRUCTIONS
[facility]  Dr. Chano Cowan  Messaging service # [phone]    POST-PROCEDURE INSTRUCTIONS:    Today you had an injection that included a steroid medications.  The steroid may or may not have been mixed with a local anesthetic when it was injected.   If the injection was in the neck, you may feel some pressure, numbness, or slight weakness in the arm after the procedure for a short period of time (this is a normal response), if this persists for longer than 1 day please contact our office or go to the emergency room.  If the injection was in the low back, you may feel some pressure, numbness, or slight weakness in the leg after the procedure for a short period of time (this is a normal response), if this persists for longer than 1 day please contact our office or go to the emergency room.  You may get side effects from the steroid.  This is not uncommon.  Symptoms include: elevated blood sugar, elevated blood pressure, headache, flushing, nausea, insomnia.  These symptoms are transient and will resolve within 1-3 days.  If symptoms last longer than this please contact our office or head to the emergency room.  Steroid medications can take anywhere from 3-14 days to take effect (rarely longer).  You may notice that your pain worsens for a short period of time after the injection, this would not be unusual due to the pressure and trauma from the needle.    If you do not have a follow up appointment scheduled, please contact my office (or the office of the physician who referred you for the procedure) to get a post-procedure follow up scheduled 2-4 weeks after the procedure.  This can be done as a virtual visit if that is more convenient for you.      What you need to do:    Keep a record of your response to the injection you had today.    How much relief did you get?   When did the relief start and how long did it last?  Were you able to decrease the use of any of your pain  medications?  Were you able to increase your level of activity?  How long did the relief last?    What to watch out for:    If you experience any of the following symptoms after your procedure, please notify the messaging service immediately (see above for contact information):   fever (increased oral temperature)   bleeding or swelling at the injection site,    drainage, rash or redness at the injection site    possible signs of infection    increased pain at the injection site   worsening of your usual pain   severe headache   new or worsening numbness    new arm and/or leg weakness, or    changes in bowel and/or bladder function: urinating or defecating on yourself and not knowing that you did it.    PLEASE FOLLOW ALL INSTRUCTIONS CAREFULLY     Do not engage in strenuous activity (e.g., lifting or pushing heavy objects or repeated bending) for 24 hours.     Do not take a bath, swim or use Jacuzzi for 24 hours after procedure. (A shower is fine).   Remove any Band-Aids when you get home.    Use cold/ice, as needed for comfort.  We recommend the use of cold therapy alternating on for 20 minutes, off for 20 minutes.    Do not apply direct heat (heating pad or heat packs) to the injection site for 24 hours.     Resume your usual medications, unless instructed otherwise by your Pain Physician.     If you are on warfarin (Coumadin) or other blood thinner, resume this medication as instructed by your prescribing Physician.    IF AT ANY POINT YOU ARE VERY CONCERNED ABOUT YOUR SYMPTOMS, or you have an urgent or emergent issue after between 5pm and 7am or on weekends, please contact  the on call provider at [phone].    If you develop worsening pain, weakness, numbness, lose bowel or bladder control (i.e., having an accident where you did not even know you had to go to the bathroom and suddenly noticed you soiled yourself), saddle anesthesia (a loss of sensation restricted to the area of the buttocks, anus and between  the legs -- i.e., those parts of your body that would touch a saddle if you were sitting on one) you need to go immediately to the emergency department for evaluation and treatment.    ----------------------------------------------------------------------------------------------------------------------------------------------------------------  If you received Sedation please read the following instructions:  POST SEDATION INSTRUCTIONS    Today you received intravenous medication (also known as sedation) that was used to help you relax and/or decrease discomfort during your procedure. This medication will be acting in your body for the next 24 hours, so you might feel a little tired or sleepy. This feeling will slowly wear off.   Common side effects associated with these medications include: drowsiness, dizziness, sleepiness, confusion, feeling excited, difficulty remembering things, lack of steadiness with walking or balance, loss of fine muscle control, slowed reflexes, difficulty focusing, and blurred vision.  Some over-the-counter and prescription medications (e.g., muscle relaxants, opioids, mood-altering medications, sedatives/hypnotics, antihistamines) can interact with the intravenous medication you received and cause an increased risk of the side effects listed above in addition to other potentially life threatening side effects. Use extreme caution if you are taking such medications, and consult with your Pain Physician or prescribing physician if you have any questions.  For the next 12-24 hours:    DO NOT--Drive a car, operate machinery or power tools   DO NOT--Drink any alcoholic beverages (not even beer), they may dangerously increase the risk of side effects.    DO NOT--Make any important legal or business decisions or sign important documents.  We advise you to have someone to assist you at home. Move slowly and carefully. Do not make sudden changes in position. Be aware of dizziness or  light-headedness and move accordingly.   If you seek medical treatment within 24 hours, let the nurse or doctor caring for you know that you have received the above medications. If you have any questions or concerns related to your sedation or treatment today please contact us.

## 2024-12-05 NOTE — DISCHARGE SUMMARY
Discharge Note  Short Stay      SUMMARY     Admit Date: 12/5/2024    Attending Physician: Chano Cowan      Discharge Physician: Chano Cowan      Discharge Date: 12/5/2024 9:57 AM    Procedure(s) (LRB):  TFESI B/L L4-5; B/L Facet Cyst Rupture L4/5 (Bilateral)    Final Diagnosis: Chronic bilateral low back pain with right-sided sciatica [M54.41, G89.29]    Disposition: Home or self care    Patient Instructions:   Current Discharge Medication List        CONTINUE these medications which have NOT CHANGED    Details   baclofen (LIORESAL) 10 MG tablet Take 10 mg by mouth 2 (two) times daily.      cetirizine (ZYRTEC) 10 MG tablet Take 10 mg by mouth once daily.      furosemide (LASIX) 20 MG tablet Take 1 tablet (20 mg total) by mouth 2 (two) times daily.  Qty: 60 tablet, Refills: 0    Associated Diagnoses: Left leg swelling      gabapentin (NEURONTIN) 300 MG capsule Take 2 capsules (600 mg total) by mouth every evening.  Qty: 180 capsule, Refills: 3    Associated Diagnoses: Chronic migraine without aura, with intractable migraine, so stated, with status migrainosus; Bilateral occipital neuralgia      magnesium 250 mg Tab Take 500 mg by mouth once daily.       multivitamin (THERAGRAN) per tablet Take 1 tablet by mouth once daily.      NURTEC 75 mg odt Take 1 tablet (75 mg total) by mouth daily as needed for Migraine.  Qty: 8 tablet, Refills: 12    Associated Diagnoses: Chronic migraine without aura, with intractable migraine, so stated, with status migrainosus      polycarbophil (FIBERCON) 625 mg tablet Take 625 mg by mouth once daily.      propranoloL (INDERAL) 40 MG tablet Take 1 tablet (40 mg total) by mouth 2 (two) times daily.  Qty: 180 tablet, Refills: 3    Comments: .  Associated Diagnoses: Chronic migraine without aura, with intractable migraine, so stated, with status migrainosus                 Discharge Diagnosis: Chronic bilateral low back pain with right-sided sciatica [M54.41, G89.29]  Condition on  Discharge: Stable with no complications to procedure   Diet on Discharge: Same as before.  Activity: as per instruction sheet.  Discharge to: Home with a responsible adult.  Follow up: 2-4 weeks       Please call my office or pager at [phone] if experienced any weakness or loss of sensation, fever > 101.5, pain uncontrolled with oral medications, persistent nausea/vomiting/or diarrhea, redness or drainage from the incisions, or any other worrisome concerns. If physician on call was not reached or could not communicate with our office for any reason please go to the nearest emergency department

## 2024-12-05 NOTE — OP NOTE
Lumbar Transforaminal Epidural Steroid Injection and Therapeutic Lumbar Facet Joint Injection with cyst rupture under Fluoroscopic Guidance    The procedure, risks, benefits, and options were discussed with the patient. There are no contraindications to the procedure. The patent expressed understanding and agreed to the procedure. Informed written consent was obtained prior to the start of the procedure and can be found in the patient's chart.    PATIENT NAME: Mrs. Silke Fulton   MRN: [id number]     DATE OF PROCEDURE: 12/05/2024    PROCEDURE:  Bilateral  L4/5 Lumbar Transforaminal Epidural Steroid Injection under Fluoroscopic Guidance  PROCEDURE: Bilateral L4/5 Lumbar Facet Joint Injection with facet cyst rupture under Fluoroscopy      PRE-OP DIAGNOSIS: Chronic bilateral low back pain with right-sided sciatica [M54.41, G89.29] Lumbar radiculopathy [M54.16]    POST-OP DIAGNOSIS: Same    PHYSICIAN: Chano Cowan MD    MEDICATIONS INJECTED: Preservative-free Decadron 10mg with 5cc of Lidocaine 1% MPF     LOCAL ANESTHETIC INJECTED: Xylocaine 2%     SEDATION: Versed 3mg and Fentanyl 150mcg                                                                                                                                                                                     Conscious sedation ordered by JOYCELYND. Patient re-evaluation prior to administration of conscious sedation. No changes noted in patient's status from initial evaluation. The patient's vital signs were monitored by RN and patient remained hemodynamically stable throughout the procedure.    Event Time In   Sedation Start 0925   Sedation End 0944       ESTIMATED BLOOD LOSS: None    COMPLICATIONS: None    TECHNIQUE: Time-out was performed to identify the patient and procedure to be performed. With the patient laying in a prone position, the surgical area was prepped and draped in the usual sterile fashion using ChloraPrep and a fenestrated drape. The level was  determined under fluoroscopy guidance. Skin anesthesia was achieved by injecting Lidocaine 2% over the injection sites. A 22 gauge, 3.5 inch needle was introduced to each level using AP, lateral and/or contralateral oblique fluoroscopic imaging. Contrast dye  Omnipaque (300mg/mL) was given until dye spread was in the distribution of the facet joint without any intravascular spread. After negative aspiration for blood or CSF was confirmed, a mixture of 5ml of normal saline and 5ml of Omnipaque (300mg/mL) was then injected and cyst rupture was observed. This was followed by a injection of 1 mL of the medication mixture listed above was slowly into each joint with displacement of the contrast confirming that the medication went into the distribution of the facet joints. The needles were removed and bleeding was nil. A sterile dressing was applied. No specimens collected. The patient tolerated the procedure well.    TECHNIQUE: Time-out was performed to identify the patient and procedure to be performed. With the patient laying in a prone position, the surgical area was prepped and draped in the usual sterile fashion using ChloraPrep and a fenestrated drape.The levels were determined under fluoroscopy guidance. Skin anesthesia was achieved by injecting Lidocaine 2% over the injection sites. The transforaminal spaces were then approached with a 22 gauge, 5 inch spinal quinke needle that was introduced under fluoroscopic guidance in the AP and Lateral views. Once the needle tip was in the area of the transforaminal space, and there was no blood, CSF or paraesthesias, contrast dye Omnipaque (300mg/mL) was injected to confirm placement and there was no vascular runoff. Fluoroscopic imaging in the AP and lateral views revealed a clear outline of the spinal nerve with proximal spread of agent through the neural foramen into the epidural space. 3 mL of the medication mixture listed above was injected slowly at each site.  Displacement of the radio opaque contrast after injection of the medication confirmed that the medication went into the area of the transforaminal spaces. The needles were removed and bleeding was nil. A sterile dressing was applied. No specimens collected. The patient tolerated the procedure well.     The patient was monitored after the procedure in the recovery area. They were given post-procedure and discharge instructions to follow at home. The patient was discharged in a stable condition.      Chano Cowan MD

## 2025-01-25 NOTE — TELEPHONE ENCOUNTER
Pt is scheduled to see Cris on 4/27 at 1:20 pm.     ----- Message from Marti Interiano sent at 4/26/2022  4:16 PM CDT -----  Needs advice from nurse:      Who Called:pt  Regarding:period is 25 days late/she took a pregnancy test that was negative  Would the patient rather a call back or VIA [facility]?  Best Call Back number:[phone]  Additional Info:        
Alert and oriented to person, place and time

## 2025-02-04 NOTE — PROGRESS NOTES
Established Patient   SUBJECTIVE:  Patient ID: Silke Fulton   Chief Complaint: Follow-up    History of Present Illness:  Silke Fulton is a 44 y.o. female who presents to clinic alone for follow-up of headaches.       02/05/2025 - Interval History:  Dropped cake stand on head 1/26 L vertex of head. She does feel like baclofen helped some. She ran out of nurtec and then had trouble getting new script from [facility]. Overall head pain well controlled but has noticed recent exacerbation after hitting her head.     Recommendations made at last Office Visit on 9/27/2024:  - prevention: continue propranolol 40mg BID and gabapentin HS  - rescue: nurtec prn. Medically necessary as patient has tried and failed several other medications (see above). Can also do baclofen as needed  - pt to document any new symptoms she is experiencing or noticed by family members to be discussed at next appointment. Can consider imaging if any red flags are present as MRI not indicated for routine monitoring of migraines.     History of Present Illness:   44 y.o. female with chronic migraines, BON, cervicogenic headache, brain neoplasm - cerebellar astrocytoma, sciatica, who presents to clinic alone for evaluation of headaches.         Patient is a previous patient of Dr. Luther, transferring care to me for treatment of chronic migraines. Current regimen includes: propranolol 40mg BID, gabapentin HS, baclofen, Nurtec, Tizanidine 4mg. Find the baclofen works better than tizanidine         PMHx negative for TBI, Meningitis, Aneurysms, Kidney Stones, asthma, GI bleed, osteoporosis, CAD/MI, CVA/TIA, DM, cancer, pregnancy         Family Hx negative for Migraines      Headache History:  Onset - childhood - 1-years old  Previous Hx of HA -   Location/Radiation - occipitalis radiating to frontalis  Quality - pressure, sharp  Duration - 1/2day with medication - 1day  Intensity (range) - 4-10/10  Frequency - 4-8/30 ha days per month,  none recently have been debilitating, once per month very severe  Triggers - weather, humidity, stress, hormones  Aggravating Factors - light, sound  Alleviating Factors - sleep  Recent Changes - improving overall  Prodrome/Aura - no  HA today? - yes, mild  Time of day of most headaches- anytime, usually in the morning  Sleep - no snoring, wakes feeling refreshed, resolution of headache with sleep     Associated symptoms with the headache:   Meningeal symptoms - photophobia, phonophobia, exercise intolerance   Nausea/vomiting - its been  a long time  Nasal drainage   Visual blurriness   Pallor/flushing  Dizziness   Vertigo  Confusion  Impaired concentration   Pain worsened with physical activity   Neck pain      Cluster headache symptoms:   Swollen or droopy eyelid  Swelling under or around the eye (may affect both eyes)  Excessive tearing  Red eye  Rhinorrhea or one-sided nasal congestion   Red, flushed face   Forehead and facial sweating     Symptoms of increased intracranial pressure:   Whooshing sounds   Visual spots/blotches      Basilar migraine symptoms:  Dysarthria  Vertigo  Tinnitus  Hypacusia  Diplopia  Simultaneous visual symptoms in both temporal and nasal fields of both eyes  Ataxia  Reduced level of consciousness  Bilateral paresthesias        Treatments Tried   Naproxen   Ketorolac  Imitrex  Maxalt   Amerge  Nurtec *  Baclofen   Propranolol   Gabapentin   Tizanidine        Current Medications:    Current Outpatient Medications:     cetirizine (ZYRTEC) 10 MG tablet, Take 10 mg by mouth once daily., Disp: , Rfl:     magnesium 250 mg Tab, Take 500 mg by mouth once daily. , Disp: , Rfl:     multivitamin (THERAGRAN) per tablet, Take 1 tablet by mouth once daily., Disp: , Rfl:     polycarbophil (FIBERCON) 625 mg tablet, Take 625 mg by mouth once daily., Disp: , Rfl:     baclofen (LIORESAL) 10 MG tablet, Take 1 tablet (10 mg total) by mouth 2 (two) times daily., Disp: 20 tablet, Rfl: 2    furosemide (LASIX)  20 MG tablet, Take 1 tablet (20 mg total) by mouth 2 (two) times daily., Disp: 60 tablet, Rfl: 0    gabapentin (NEURONTIN) 300 MG capsule, Take 2 capsules (600 mg total) by mouth every evening., Disp: 180 capsule, Rfl: 3    NURTEC 75 mg odt, dissolve 1 tablet (75 mg total) by mouth daily as needed for Migraine., Disp: 8 tablet, Rfl: 12    propranoloL (INDERAL) 40 MG tablet, Take 1 tablet (40 mg total) by mouth 2 (two) times daily., Disp: 180 tablet, Rfl: 3    Review of Systems - as per HPI, otherwise a balanced 10 systems review is negative.    OBJECTIVE:  Vitals:  /60 (Patient Position: Sitting)   Pulse 86   Wt 89.4 kg (197 lb 1.5 oz)   BMI 36.05 kg/m²      Physical Exam:  Constitutional: she appears well-developed and well-nourished. she is well groomed. NAD   HENT:    Head: Normocephalic and atraumatic  Eyes: Conjunctivae and EOM are normal  Musculoskeletal: Normal range of motion. No joint stiffness.   Skin: Skin is warm and dry.  Psychiatric: Mood and affect are normal    Neuro: Patient is alert and oriented to person, place, and time. Language is intact and fluent. Speech is clear and fluent. Recent and remote memory are intact.  Normal attention and concentration.  Facial movement is symmetric. Moves all 4 extremities against gravity. Gait and station normal.  Cranial Nerves II through XII without focal deficit.     Review of Data:   Notes from pcp reviewed   Labs:  Admission on 12/05/2024, Discharged on 12/05/2024   Component Date Value Ref Range Status    POC Preg Test, Ur 12/05/2024 Negative  Negative Final     Acceptable 12/05/2024 Yes   Final     Imaging:  Results for orders placed or performed during the hospital encounter of 12/22/17   MRI Brain W WO Contrast    Narrative    Procedure: MRI the brain with andwithout contrast.    Technique: Sagittal and axial T1, axial T2, axial FLAIR, axial gradient, axial diffusion, and axial, sagittal, and coronal postcontrast T1 images of the  whole brain. 8  ml of Gadavist injected intravenously.         Comparison: 09/15/2015    Findings: There is remote operative change from suboccipital craniotomy for parasagittal posterior fossa lesion resection there is continued encephalomalacia of the cerebellum with subtle margin of T2 flair signal hyperintensity in the adjacent parenchyma suggestive for gliosis and scarring. There is no evidence for new prior focal signal abnormality or enhancement to suggest worsening residual or recurrent lesion.    The ventricles are stable without hydrocephalus. There is mild prominence of the posterior horn the lateral ventricles symmetrically which may be sequela of prior hydrocephalus.    Punctate foci susceptibility posterior fossa suggestive for remote blood products primarily along the margin of the fourth ventricular resection cavity. Measured cranial T2 flow voids are present.    Impression     Remote operative change from suboccipital craniotomy with continued right cerebellar encephalomalacia. Continued and relatively stable subtle flair hyperintensity margin of the cerebellum suggestive for gliosis and scarring. No evidence for new signal abnormality or enhancement to suggest residual or recurrent mass.    Stable configuration of ventricles with mild prominence of the posterior horns lateral ventricles and moderate prominence of the fourth ventricle.    Please note there is mild mucosal thickening and probable small aerated secretions within the left maxillary antra cannot exclude component of acute sinusitis.        .      Electronically signed by: SHELBY CUBA DO  Date:     12/22/17  Time:    12:17    Results for orders placed or performed during the hospital encounter of 08/04/14   CT Head Without Contrast    Narrative    Comparison is made to a cranial CT exam dated 3/15/04, as well as to a more recent cranial MR exam of 12/10/07.    Postoperative changes are again identified in the posterior fossa, with an  occipital calvarial defect observed and with a sizeable area of hypoattenuation representing local porencephaly/gliotic scarring seen within the superomedial aspect of the right   cerebellar hemisphere/superior cerebellar vermis.  This area of encephalomalacia is unchanged from the examinations noted above.  There is some associated prominence of the CSF spaces over the right cerebellar hemisphere, as before.  There is moderate   enlargement of both lateral ventricles and enlargement of the fourth ventricle identified, but the size of the ventricular system is unchanged from the previous examinations, and the ventricles are normal in position without midline shift.  Well defined,   normal appearing sulci are identified over both cerebral convexities.  Parenchymal brain attenuation demonstrates no detrimental interval change since the 2004 CT exam.  No evidence of recent intracranial hemorrhage.  No new areas of hypoattenuation   indicating recent cerebral ischemia/infarction.  No subdural collections.  No findings on the noncontrast cranial CT to specifically suggest recurrent neoplasm.  Incidental note is made of mucosal thickening in the posterior ethmoid air cells on the   right side.    Impression     Noncontrast cranial CT examination demonstrates postoperative changes related to the posterior fossa as discussed above.  There has been no significant detrimental interval change since the cranial CT of 3/15/04.           Electronically signed by: Avi Rivas MD  Date:     08/05/14  Time:    05:26      Note: I have independently reviewed any/all imaging/labs/tests and agree with the report (s) as documented.  Any discrepancies will be as noted/demarcated by free text.  ROBE, ANALISA-C 2/5/2025    ASSESSMENT:  1. Cervicogenic headache    2. Chronic migraine without aura, with intractable migraine, so stated, with status migrainosus    3. Bilateral occipital neuralgia        PLAN:  - prevention: continue propranolol 40mg  BID and gabapentin HS  - rescue: nurtec prn. Medically necessary as patient has tried and failed several other medications (see above). Can also continue to use baclofen as needed  - head injury: pt will continue to use baclofen and refill her nurtec (will send to [facility]), however, pt will notify if head pain persists. Can try medrol dose pack and will consider eval by sports medicine if necessary.   - Continue tracking headaches   - Discussed goals of therapy are to decrease the frequency, intensity, and duration of headaches  - RTC in 3 months or sooner if needed.      Orders Placed This Encounter    propranoloL (INDERAL) 40 MG tablet    gabapentin (NEURONTIN) 300 MG capsule    NURTEC 75 mg odt    baclofen (LIORESAL) 10 MG tablet       Discussed potential for teratogenicity with treatment, patient understands if her family planning status should change she will contact office immediately and we will safely adjust medications as needed.     Questions and concerns were sought and answered to the patient's stated verbal satisfaction.  The patient verbalizes understanding and agreement with the above stated treatment plan.     CC: Tesfaye Bennett MD Monique Smith, FNP-C  [facility] [place]   [phone]    Dr. Muro was available during today's encounter.

## 2025-02-05 ENCOUNTER — OFFICE VISIT (OUTPATIENT)
Facility: CLINIC | Age: 45
End: 2025-02-05
Payer: COMMERCIAL

## 2025-02-05 VITALS
BODY MASS INDEX: 36.05 KG/M2 | DIASTOLIC BLOOD PRESSURE: 60 MMHG | SYSTOLIC BLOOD PRESSURE: 117 MMHG | WEIGHT: 197.06 LBS | HEART RATE: 86 BPM

## 2025-02-05 DIAGNOSIS — M54.81 BILATERAL OCCIPITAL NEURALGIA: ICD-10-CM

## 2025-02-05 DIAGNOSIS — G43.711 CHRONIC MIGRAINE WITHOUT AURA, WITH INTRACTABLE MIGRAINE, SO STATED, WITH STATUS MIGRAINOSUS: Primary | ICD-10-CM

## 2025-02-05 DIAGNOSIS — G44.86 CERVICOGENIC HEADACHE: ICD-10-CM

## 2025-02-05 PROCEDURE — 1159F MED LIST DOCD IN RCRD: CPT | Mod: CPTII,S$GLB,,

## 2025-02-05 PROCEDURE — 3074F SYST BP LT 130 MM HG: CPT | Mod: CPTII,S$GLB,,

## 2025-02-05 PROCEDURE — 3008F BODY MASS INDEX DOCD: CPT | Mod: CPTII,S$GLB,,

## 2025-02-05 PROCEDURE — 99214 OFFICE O/P EST MOD 30 MIN: CPT | Mod: S$GLB,,,

## 2025-02-05 PROCEDURE — 99999 PR PBB SHADOW E&M-EST. PATIENT-LVL III: CPT | Mod: PBBFAC,,,

## 2025-02-05 PROCEDURE — 3078F DIAST BP <80 MM HG: CPT | Mod: CPTII,S$GLB,,

## 2025-02-05 RX ORDER — BACLOFEN 10 MG/1
10 TABLET ORAL 2 TIMES DAILY
Qty: 20 TABLET | Refills: 2 | Status: SHIPPED | OUTPATIENT
Start: 2025-02-05

## 2025-02-05 RX ORDER — GABAPENTIN 300 MG/1
600 CAPSULE ORAL NIGHTLY
Qty: 180 CAPSULE | Refills: 3 | Status: SHIPPED | OUTPATIENT
Start: 2025-02-05

## 2025-02-05 RX ORDER — PROPRANOLOL HYDROCHLORIDE 40 MG/1
40 TABLET ORAL 2 TIMES DAILY
Qty: 180 TABLET | Refills: 3 | Status: SHIPPED | OUTPATIENT
Start: 2025-02-05

## 2025-02-05 RX ORDER — RIMEGEPANT SULFATE 75 MG/75MG
75 TABLET, ORALLY DISINTEGRATING ORAL DAILY PRN
Qty: 8 TABLET | Refills: 12 | Status: SHIPPED | OUTPATIENT
Start: 2025-02-05

## 2025-02-18 ENCOUNTER — PATIENT MESSAGE (OUTPATIENT)
Facility: CLINIC | Age: 45
End: 2025-02-18
Payer: COMMERCIAL

## 2025-03-13 ENCOUNTER — OFFICE VISIT (OUTPATIENT)
Dept: OBSTETRICS AND GYNECOLOGY | Facility: CLINIC | Age: 45
End: 2025-03-13
Attending: OBSTETRICS & GYNECOLOGY
Payer: COMMERCIAL

## 2025-03-13 VITALS
HEIGHT: 62 IN | WEIGHT: 197.31 LBS | BODY MASS INDEX: 36.31 KG/M2 | SYSTOLIC BLOOD PRESSURE: 126 MMHG | DIASTOLIC BLOOD PRESSURE: 72 MMHG

## 2025-03-13 DIAGNOSIS — Z01.419 WOMEN'S ANNUAL ROUTINE GYNECOLOGICAL EXAMINATION: Primary | ICD-10-CM

## 2025-03-13 DIAGNOSIS — Z12.31 ENCOUNTER FOR SCREENING MAMMOGRAM FOR MALIGNANT NEOPLASM OF BREAST: ICD-10-CM

## 2025-03-13 PROCEDURE — 3074F SYST BP LT 130 MM HG: CPT | Mod: CPTII,S$GLB,, | Performed by: OBSTETRICS & GYNECOLOGY

## 2025-03-13 PROCEDURE — 3008F BODY MASS INDEX DOCD: CPT | Mod: CPTII,S$GLB,, | Performed by: OBSTETRICS & GYNECOLOGY

## 2025-03-13 PROCEDURE — 3078F DIAST BP <80 MM HG: CPT | Mod: CPTII,S$GLB,, | Performed by: OBSTETRICS & GYNECOLOGY

## 2025-03-13 PROCEDURE — 99999 PR PBB SHADOW E&M-EST. PATIENT-LVL III: CPT | Mod: PBBFAC,,, | Performed by: OBSTETRICS & GYNECOLOGY

## 2025-03-13 PROCEDURE — 1160F RVW MEDS BY RX/DR IN RCRD: CPT | Mod: CPTII,S$GLB,, | Performed by: OBSTETRICS & GYNECOLOGY

## 2025-03-13 PROCEDURE — 1159F MED LIST DOCD IN RCRD: CPT | Mod: CPTII,S$GLB,, | Performed by: OBSTETRICS & GYNECOLOGY

## 2025-03-13 PROCEDURE — 99396 PREV VISIT EST AGE 40-64: CPT | Mod: S$GLB,,, | Performed by: OBSTETRICS & GYNECOLOGY

## 2025-03-13 NOTE — PROGRESS NOTES
"Silke Fulton is a 44 y.o. female  who presents for annual exam.  She had been seen 2024 for evaluation of abnormal bleeding- work-up included pelvic ultrasound and EMBX.  Since then, periods have been normal.  Now, menses occur monthly, lasting 4 days duration, without intermenstrual bleeding.   Denies recent changes in her medical or surgical history.  No GYN complaints.  Patient's last menstrual period was 2025 (exact date).    Blood pressure 126/72, height 5' 2" (1.575 m), weight 89.5 kg (197 lb 5 oz), last menstrual period 2025.    2024 Pap: Negative, HPV: Negative    2024 Mammogram: Negative, TC: 17.67%    Past Medical History:   Diagnosis Date    Acute gastritis without mention of hemorrhage     Esophageal reflux     Meningitis     staph aureus    Migraine without aura, without mention of intractable migraine without mention of status migrainosus     Personal history of malignant neoplasm of brain     cerebellar astrocytoma    Tarsal tunnel syndrome        Past Surgical History:   Procedure Laterality Date    BRAIN SURGERY      cerebellar astrocytoma      CHOLECYSTECTOMY      eye-superior oblique palsy      INJECTION, SPINE, LUMBOSACRAL, TRANSFORAMINAL APPROACH Bilateral 2024    Procedure: TFESI B/L L4-5; B/L Facet Cyst Rupture L4/5;  Surgeon: Chano Cowan MD;  Location: [facility];  Service: Pain Management;  Laterality: Bilateral;  no ac    right mastoidectomy         OB History          0    Para        Term   0            AB        Living             SAB        IAB        Ectopic        Multiple        Live Births                     ROS:  GENERAL: Feeling well overall.   SKIN: Denies rash or lesions.   HEAD: Denies head injury or headache.   NODES: Denies enlarged lymph nodes.   CHEST: Denies chest pain or shortness of breath.   CARDIOVASCULAR: Denies palpitations or left sided chest pain.   ABDOMEN: No abdominal pain, nausea, " vomiting or rectal bleeding.   URINARY: No dysuria or hematuria.  REPRODUCTIVE: See HPI.   BREASTS: Denies pain, lumps, or nipple discharge.   HEMATOLOGIC: No easy bruisability or excessive bleeding.   MUSCULOSKELETAL: Reports some joint discomfort.  NEUROLOGIC: Denies syncope or weakness.   PSYCHIATRIC: Denies depression.    PE:   (chaperone present during entire exam)  APPEARANCE: Well nourished, well developed, in no acute distress.  BREASTS: Symmetrical, no skin changes or visible lesions. No palpable masses, nipple discharge or adenopathy bilaterally.  ABDOMEN: Soft. No tenderness or masses.   VULVA: No lesions. Normal female genitalia.  URETHRAL MEATUS: Normal size and location, no lesions, no prolapse.  URETHRA: No masses, tenderness, prolapse or scarring.  VAGINA: Moist and well rugated, no abnormal discharge, no significant cystocele or rectocele.  CERVIX: No lesions and discharge.   UTERUS: Normal size, regular shape, mobile, non-tender, bladder base nontender.  ADNEXA: No masses, tenderness or CDS nodularity.  ANUS PERINEUM: Normal.      Diagnosis:  1. Women's annual routine gynecological examination    2. Encounter for screening mammogram for malignant neoplasm of breast          PLAN:    Orders Placed This Encounter    Mammo Digital Screening Bilat w/ Zane ([facility])       Patient was counseled today on perimenopausal issues.    Follow-up in 1 year.    This note was created with voice recognition software.  Grammatical, syntax and spelling errors may be inevitable.

## 2025-04-14 ENCOUNTER — HOSPITAL ENCOUNTER (OUTPATIENT)
Dept: RADIOLOGY | Facility: HOSPITAL | Age: 45
Discharge: HOME | End: 2025-04-14
Attending: OBSTETRICS & GYNECOLOGY
Payer: COMMERCIAL

## 2025-04-14 DIAGNOSIS — Z12.31 ENCOUNTER FOR SCREENING MAMMOGRAM FOR MALIGNANT NEOPLASM OF BREAST: ICD-10-CM

## 2025-04-14 PROCEDURE — 77063 BREAST TOMOSYNTHESIS BI: CPT | Mod: 26,,, | Performed by: RADIOLOGY

## 2025-04-14 PROCEDURE — 77067 SCR MAMMO BI INCL CAD: CPT | Mod: 26,,, | Performed by: RADIOLOGY

## 2025-04-14 PROCEDURE — 77063 BREAST TOMOSYNTHESIS BI: CPT | Mod: TC

## 2025-04-16 ENCOUNTER — PATIENT MESSAGE (OUTPATIENT)
Dept: OBSTETRICS AND GYNECOLOGY | Facility: CLINIC | Age: 45
End: 2025-04-16
Payer: COMMERCIAL

## 2025-04-23 ENCOUNTER — OFFICE VISIT (OUTPATIENT)
Dept: URGENT CARE | Facility: CLINIC | Age: 45
End: 2025-04-23
Payer: COMMERCIAL

## 2025-04-23 VITALS
TEMPERATURE: 98 F | SYSTOLIC BLOOD PRESSURE: 132 MMHG | OXYGEN SATURATION: 98 % | DIASTOLIC BLOOD PRESSURE: 85 MMHG | HEART RATE: 74 BPM | WEIGHT: 197 LBS | HEIGHT: 62 IN | BODY MASS INDEX: 36.25 KG/M2 | RESPIRATION RATE: 19 BRPM

## 2025-04-23 DIAGNOSIS — J06.9 VIRAL URI WITH COUGH: ICD-10-CM

## 2025-04-23 DIAGNOSIS — L25.9 CONTACT DERMATITIS, UNSPECIFIED CONTACT DERMATITIS TYPE, UNSPECIFIED TRIGGER: ICD-10-CM

## 2025-04-23 DIAGNOSIS — H10.023 OTHER MUCOPURULENT CONJUNCTIVITIS OF BOTH EYES: Primary | ICD-10-CM

## 2025-04-23 PROCEDURE — 99213 OFFICE O/P EST LOW 20 MIN: CPT | Mod: S$GLB,,, | Performed by: NURSE PRACTITIONER

## 2025-04-23 RX ORDER — BENZONATATE 200 MG/1
200 CAPSULE ORAL EVERY 8 HOURS PRN
Qty: 30 CAPSULE | Refills: 0 | Status: SHIPPED | OUTPATIENT
Start: 2025-04-23

## 2025-04-23 RX ORDER — OFLOXACIN 3 MG/ML
1 SOLUTION/ DROPS OPHTHALMIC EVERY 4 HOURS
Qty: 5 ML | Refills: 0 | Status: SHIPPED | OUTPATIENT
Start: 2025-04-23 | End: 2025-04-30

## 2025-04-23 RX ORDER — TRIAMCINOLONE ACETONIDE 1 MG/G
OINTMENT TOPICAL 2 TIMES DAILY PRN
Qty: 80 G | Refills: 0 | Status: SHIPPED | OUTPATIENT
Start: 2025-04-23 | End: 2025-04-30

## 2025-04-23 RX ORDER — HYDROXYZINE HYDROCHLORIDE 25 MG/1
25 TABLET, FILM COATED ORAL EVERY 8 HOURS PRN
Qty: 30 TABLET | Refills: 0 | Status: SHIPPED | OUTPATIENT
Start: 2025-04-23

## 2025-04-23 NOTE — PROGRESS NOTES
"Subjective:      Patient ID: Silke Fulton is a 44 y.o. female.    Vitals:  height is 5' 2" (1.575 m) and weight is 89.4 kg (197 lb). Her oral temperature is 98.4 °F (36.9 °C). Her blood pressure is 132/85 and her pulse is 74. Her respiration is 19 and oxygen saturation is 98%.     Chief Complaint: Eye Pain    This is a 44 y.o. female who presents today with a chief complaint of bilateral eye discomfort. Pt states left eye hurts, is itchy, crusty and painful; and now right eye is becoming symptomatic.  Symptoms began yesterday and worsened today.   Also c/o cough and congestion. Denies fever.   Reports itchy rash to legs, as well that has spread to arms.       Eye Pain   The left eye is affected. This is a new problem. The current episode started in the past 7 days. The problem occurs constantly. The problem has been unchanged. There was no injury mechanism. The pain is at a severity of 4/10. The pain is moderate. There is No known exposure to pink eye. She Does not wear contacts. Associated symptoms include an eye discharge, eye redness and itching. Pertinent negatives include no double vision, fever, foreign body sensation, nausea, photophobia, recent URI or vomiting. She has tried nothing for the symptoms. The treatment provided no relief.       Constitution: Negative for fever.   HENT:  Positive for congestion and postnasal drip.    Eyes:  Positive for eye discharge, eye itching, eye pain and eye redness. Negative for eye trauma, photophobia and double vision.   Respiratory:  Positive for cough. Negative for shortness of breath and wheezing.    Gastrointestinal:  Negative for nausea, vomiting and diarrhea.   Skin:  Positive for rash.   Allergic/Immunologic: Positive for itching.      Objective:     Physical Exam   Constitutional: She is oriented to person, place, and time.  Non-toxic appearance. She does not appear ill. No distress.   HENT:   Head: Normocephalic.   Nose: Congestion present. "   Mouth/Throat: Mucous membranes are moist. No oropharyngeal exudate or posterior oropharyngeal erythema. Oropharynx is clear.   Eyes: Pupils are equal, round, and reactive to light. Right eye exhibits no discharge, no exudate and no hordeolum. Left eye exhibits discharge and exudate. Left eye exhibits no hordeolum. Right conjunctiva is injected. Left conjunctiva is injected. Right eye exhibits normal extraocular motion. Left eye exhibits normal extraocular motion. Extraocular movement intact   Neck: Neck supple. No neck rigidity present.   Cardiovascular: Normal rate and regular rhythm.   Pulmonary/Chest: Effort normal and breath sounds normal. No respiratory distress.   Abdominal: Normal appearance.   Musculoskeletal: Normal range of motion.         General: Normal range of motion.   Neurological: She is alert and oriented to person, place, and time.   Skin: Skin is warm, dry, not diaphoretic and rash (contact-dermatitis appearing rash noted to bilateral lower legs. no open wounds or oozing. no cellulitis.).   Psychiatric: Her behavior is normal.   Nursing note and vitals reviewed.      Assessment:     1. Other mucopurulent conjunctivitis of both eyes    2. Viral URI with cough    3. Contact dermatitis, unspecified contact dermatitis type, unspecified trigger        Plan:       Other mucopurulent conjunctivitis of both eyes  -     ofloxacin (OCUFLOX) 0.3 % ophthalmic solution; Place 1 drop into both eyes every 4 (four) hours. for 7 days  Dispense: 5 mL; Refill: 0    Viral URI with cough  -     benzonatate (TESSALON) 200 MG capsule; Take 1 capsule (200 mg total) by mouth every 8 (eight) hours as needed for Cough.  Dispense: 30 capsule; Refill: 0    Contact dermatitis, unspecified contact dermatitis type, unspecified trigger  -     hydrOXYzine HCL (ATARAX) 25 MG tablet; Take 1 tablet (25 mg total) by mouth every 8 (eight) hours as needed for Itching.  Dispense: 30 tablet; Refill: 0  -     triamcinolone acetonide  0.1% (KENALOG) 0.1 % ointment; Apply topically 2 (two) times daily as needed (rash/itch).  Dispense: 80 g; Refill: 0      Patient Instructions   Pink eye:   Avoid rubbing eyes  Good handwashing  Warm compresses to help remove discharge, and for comfort  Alternate with cool compresses to help with swelling, if needed  This is contagious, so wipe down surfaces after touching  Avoid eye make-up and contact lenses, if applicable   Do not use same eye make up, anymore-if applicable   Change pillow case nightly      Cough:   Oral fluids  Hot tea with honey   Throat or cough lozenges  Ibuprofen or tylenol for any aches or fever   Rest  Steam from hot showers to help open upper airway  Blow nose often  Avoid circulating air (such as ceiling fans) dries your airway  Avoid drinking cold drinks (worsens cough)  Avoid strong smells which could worsen cough (perfume, lotions, smoke...)  You can also try a humidifier  Therapeutic coughing to expel mucous  Sit in upright position often  Follow up with any worsening symptoms; and seek ER care with any wheezing, retractions, or respiratory distress; lethargy; dehydration...     Rash:   Avoid scratching the area  Good handwashing  Avoid heat/humidity-which can worsen rash  Take cooler than normal showers  Use gentle soaps and body products   Avoid any products with fragrance until rash resolves  Ice packs as needed   Rub with soft cloth if you get the desire to scratch

## 2025-04-23 NOTE — PATIENT INSTRUCTIONS
Pink eye:   Avoid rubbing eyes  Good handwashing  Warm compresses to help remove discharge, and for comfort  Alternate with cool compresses to help with swelling, if needed  This is contagious, so wipe down surfaces after touching  Avoid eye make-up and contact lenses, if applicable   Do not use same eye make up, anymore-if applicable   Change pillow case nightly      Cough:   Oral fluids  Hot tea with honey   Throat or cough lozenges  Ibuprofen or tylenol for any aches or fever   Rest  Steam from hot showers to help open upper airway  Blow nose often  Avoid circulating air (such as ceiling fans) dries your airway  Avoid drinking cold drinks (worsens cough)  Avoid strong smells which could worsen cough (perfume, lotions, smoke...)  You can also try a humidifier  Therapeutic coughing to expel mucous  Sit in upright position often  Follow up with any worsening symptoms; and seek ER care with any wheezing, retractions, or respiratory distress; lethargy; dehydration...     Rash:   Avoid scratching the area  Good handwashing  Avoid heat/humidity-which can worsen rash  Take cooler than normal showers  Use gentle soaps and body products   Avoid any products with fragrance until rash resolves  Ice packs as needed   Rub with soft cloth if you get the desire to scratch

## 2025-04-26 ENCOUNTER — PATIENT MESSAGE (OUTPATIENT)
Dept: INTERNAL MEDICINE | Facility: CLINIC | Age: 45
End: 2025-04-26
Payer: COMMERCIAL

## 2025-05-07 ENCOUNTER — OFFICE VISIT (OUTPATIENT)
Facility: CLINIC | Age: 45
End: 2025-05-07
Payer: COMMERCIAL

## 2025-05-07 VITALS
HEIGHT: 62 IN | SYSTOLIC BLOOD PRESSURE: 124 MMHG | BODY MASS INDEX: 35.88 KG/M2 | WEIGHT: 195 LBS | DIASTOLIC BLOOD PRESSURE: 77 MMHG | HEART RATE: 58 BPM

## 2025-05-07 DIAGNOSIS — G43.711 CHRONIC MIGRAINE WITHOUT AURA, WITH INTRACTABLE MIGRAINE, SO STATED, WITH STATUS MIGRAINOSUS: Primary | ICD-10-CM

## 2025-05-07 DIAGNOSIS — M54.81 BILATERAL OCCIPITAL NEURALGIA: ICD-10-CM

## 2025-05-07 DIAGNOSIS — G44.86 CERVICOGENIC HEADACHE: ICD-10-CM

## 2025-05-07 PROCEDURE — 99999 PR PBB SHADOW E&M-EST. PATIENT-LVL III: CPT | Mod: PBBFAC,,, | Performed by: PHYSICIAN ASSISTANT

## 2025-05-07 PROCEDURE — 1159F MED LIST DOCD IN RCRD: CPT | Mod: CPTII,S$GLB,, | Performed by: PHYSICIAN ASSISTANT

## 2025-05-07 PROCEDURE — 99214 OFFICE O/P EST MOD 30 MIN: CPT | Mod: S$GLB,,, | Performed by: PHYSICIAN ASSISTANT

## 2025-05-07 PROCEDURE — 3078F DIAST BP <80 MM HG: CPT | Mod: CPTII,S$GLB,, | Performed by: PHYSICIAN ASSISTANT

## 2025-05-07 PROCEDURE — 3074F SYST BP LT 130 MM HG: CPT | Mod: CPTII,S$GLB,, | Performed by: PHYSICIAN ASSISTANT

## 2025-05-07 PROCEDURE — 1160F RVW MEDS BY RX/DR IN RCRD: CPT | Mod: CPTII,S$GLB,, | Performed by: PHYSICIAN ASSISTANT

## 2025-05-07 PROCEDURE — 3008F BODY MASS INDEX DOCD: CPT | Mod: CPTII,S$GLB,, | Performed by: PHYSICIAN ASSISTANT

## 2025-05-07 NOTE — PROGRESS NOTES
"Established Patient   SUBJECTIVE:  Patient ID: Silke Fulton   Chief Complaint: Follow-up    History of Present Illness:  Silke Fulton is a 44 y.o. female who presents to clinic alone for follow-up of headaches.       05/07/2025 - Interval History:  Pt is seen for migraines by VITALY Martinez, accommodated into visit today by covering provider for a follow up. Pt reports her ha's have improved since her last visit. They are occurring 1-2 times a week. She currently takes Propranolol 40mg bid, gabapentin qhs, nurtec prn, and baclofen prn. Reviewed refills on file w/ pt and locations. Pt also inquiring what time to take her gabapentin. When taken at 10-11pm she awakens with drowsiness. When taken before 9:30, she does not. Advised pt to begin taking at 9:00pm. Pt also reports times when she forgets to take a dose. Discussed strategies to assist her such as setting a reminder or placing her prescription at her dinner table as she is typically eating dinner at that time. No further questions/concerns at this time.     Treatments Tried:  Naproxen   Ketorolac  Imitrex  Maxalt   Amerge  Nurtec *  Baclofen   Propranolol   Gabapentin   Tizanidine     Current Medications:  Current Medications[1]    Review of Systems - as per HPI, otherwise a balanced 10 systems review is negative.    OBJECTIVE:  Vitals:  /77   Pulse (!) 58   Ht 5' 2" (1.575 m)   Wt 88.5 kg (195 lb)   LMP 04/21/2025   BMI 35.67 kg/m²      Physical Exam:  Constitutional: she appears well-developed and well-nourished. she is well groomed. NAD   HENT:    Head: Normocephalic and atraumatic  Eyes: Conjunctivae and EOM are normal  Musculoskeletal: Normal range of motion. No joint stiffness.   Skin: Skin is warm and dry.  Psychiatric: Mood and affect are normal    Neuro: Patient is alert and oriented to person, place, and time. Language is intact and fluent. Speech is clear and fluent. Recent and remote memory are intact.  Normal attention " and concentration.  Facial movement is symmetric. Moves all 4 extremities against gravity. Gait and station normal.  Cranial Nerves II through XII without focal deficit.     Review of Data:   Notes from neuro reviewed   Labs:  No visits with results within 3 Month(s) from this visit.   Latest known visit with results is:   Admission on 12/05/2024, Discharged on 12/05/2024   Component Date Value Ref Range Status    POC Preg Test, Ur 12/05/2024 Negative  Negative Final     Acceptable 12/05/2024 Yes   Final     Imaging:  Results for orders placed or performed during the hospital encounter of 12/22/17   MRI Brain W WO Contrast    Narrative    Procedure: MRI the brain with andwithout contrast.    Technique: Sagittal and axial T1, axial T2, axial FLAIR, axial gradient, axial diffusion, and axial, sagittal, and coronal postcontrast T1 images of the whole brain. 8  ml of Gadavist injected intravenously.         Comparison: 09/15/2015    Findings: There is remote operative change from suboccipital craniotomy for parasagittal posterior fossa lesion resection there is continued encephalomalacia of the cerebellum with subtle margin of T2 flair signal hyperintensity in the adjacent parenchyma suggestive for gliosis and scarring. There is no evidence for new prior focal signal abnormality or enhancement to suggest worsening residual or recurrent lesion.    The ventricles are stable without hydrocephalus. There is mild prominence of the posterior horn the lateral ventricles symmetrically which may be sequela of prior hydrocephalus.    Punctate foci susceptibility posterior fossa suggestive for remote blood products primarily along the margin of the fourth ventricular resection cavity. Measured cranial T2 flow voids are present.    Impression     Remote operative change from suboccipital craniotomy with continued right cerebellar encephalomalacia. Continued and relatively stable subtle flair hyperintensity margin of  the cerebellum suggestive for gliosis and scarring. No evidence for new signal abnormality or enhancement to suggest residual or recurrent mass.    Stable configuration of ventricles with mild prominence of the posterior horns lateral ventricles and moderate prominence of the fourth ventricle.    Please note there is mild mucosal thickening and probable small aerated secretions within the left maxillary antra cannot exclude component of acute sinusitis.        .      Electronically signed by: SHELBY CUBA DO  Date:     12/22/17  Time:    12:17    Results for orders placed or performed during the hospital encounter of 08/04/14   CT Head Without Contrast    Narrative    Comparison is made to a cranial CT exam dated 3/15/04, as well as to a more recent cranial MR exam of 12/10/07.    Postoperative changes are again identified in the posterior fossa, with an occipital calvarial defect observed and with a sizeable area of hypoattenuation representing local porencephaly/gliotic scarring seen within the superomedial aspect of the right   cerebellar hemisphere/superior cerebellar vermis.  This area of encephalomalacia is unchanged from the examinations noted above.  There is some associated prominence of the CSF spaces over the right cerebellar hemisphere, as before.  There is moderate   enlargement of both lateral ventricles and enlargement of the fourth ventricle identified, but the size of the ventricular system is unchanged from the previous examinations, and the ventricles are normal in position without midline shift.  Well defined,   normal appearing sulci are identified over both cerebral convexities.  Parenchymal brain attenuation demonstrates no detrimental interval change since the 2004 CT exam.  No evidence of recent intracranial hemorrhage.  No new areas of hypoattenuation   indicating recent cerebral ischemia/infarction.  No subdural collections.  No findings on the noncontrast cranial CT to specifically  suggest recurrent neoplasm.  Incidental note is made of mucosal thickening in the posterior ethmoid air cells on the   right side.    Impression     Noncontrast cranial CT examination demonstrates postoperative changes related to the posterior fossa as discussed above.  There has been no significant detrimental interval change since the cranial CT of 3/15/04.           Electronically signed by: Avi Rivas MD  Date:     08/05/14  Time:    05:26      Note: I have independently reviewed any/all imaging/labs/tests and agree with the report (s) as documented.  Any discrepancies will be as noted/demarcated by free text.  MONY PEARCE 5/7/2025    ASSESSMENT:  1. Chronic migraine without aura, with intractable migraine, so stated, with status migrainosus    2. Bilateral occipital neuralgia    3. Cervicogenic headache        PLAN:  - ppx - continue Propranolol 40mg bid, gabapentin qhs (take at 9pm)  - abortives - nurtec prn, and baclofen prn  - RTC w/ NP Juan in 3-6 mo         Discussed potential for teratogenicity with treatment, patient understands if her family planning status should change she will contact office immediately and we will safely adjust medications as needed.     Questions and concerns were sought and answered to the patient's stated verbal satisfaction.  The patient verbalizes understanding and agreement with the above stated treatment plan.     CC: Tesfaye Bennett MD Sarena Patel, PA-C  [facility]   [phone]    Dr. Muro was available during today's encounter.            [1]   Current Outpatient Medications:     baclofen (LIORESAL) 10 MG tablet, Take 1 tablet (10 mg total) by mouth 2 (two) times daily., Disp: 20 tablet, Rfl: 2    benzonatate (TESSALON) 200 MG capsule, Take 1 capsule (200 mg total) by mouth every 8 (eight) hours as needed for Cough., Disp: 30 capsule, Rfl: 0    cetirizine (ZYRTEC) 10 MG tablet, Take 10 mg by mouth once daily., Disp: , Rfl:     gabapentin (NEURONTIN)  300 MG capsule, Take 2 capsules (600 mg total) by mouth every evening., Disp: 180 capsule, Rfl: 3    hydrOXYzine HCL (ATARAX) 25 MG tablet, Take 1 tablet (25 mg total) by mouth every 8 (eight) hours as needed for Itching., Disp: 30 tablet, Rfl: 0    magnesium 250 mg Tab, Take 500 mg by mouth once daily. , Disp: , Rfl:     multivitamin (THERAGRAN) per tablet, Take 1 tablet by mouth once daily., Disp: , Rfl:     NURTEC 75 mg odt, dissolve 1 tablet (75 mg total) by mouth daily as needed for Migraine., Disp: 8 tablet, Rfl: 12    polycarbophil (FIBERCON) 625 mg tablet, Take 625 mg by mouth once daily., Disp: , Rfl:     propranoloL (INDERAL) 40 MG tablet, Take 1 tablet (40 mg total) by mouth 2 (two) times daily., Disp: 180 tablet, Rfl: 3    triamcinolone acetonide 0.1% (KENALOG) 0.1 % ointment, Apply topically 2 (two) times daily as needed (rash/itch)., Disp: 80 g, Rfl: 0

## 2025-06-10 ENCOUNTER — PATIENT MESSAGE (OUTPATIENT)
Dept: OPTOMETRY | Facility: CLINIC | Age: 45
End: 2025-06-10

## 2025-06-10 ENCOUNTER — OFFICE VISIT (OUTPATIENT)
Dept: OPTOMETRY | Facility: CLINIC | Age: 45
End: 2025-06-10
Payer: COMMERCIAL

## 2025-06-10 DIAGNOSIS — H52.203 ASTIGMATISM OF BOTH EYES, UNSPECIFIED TYPE: ICD-10-CM

## 2025-06-10 DIAGNOSIS — H52.4 PRESBYOPIA: ICD-10-CM

## 2025-06-10 DIAGNOSIS — Z85.841 PERSONAL HISTORY OF MALIGNANT NEOPLASM OF BRAIN: ICD-10-CM

## 2025-06-10 DIAGNOSIS — Z98.890 HISTORY OF STRABISMUS SURGERY: ICD-10-CM

## 2025-06-10 DIAGNOSIS — H04.123 DRY EYE SYNDROME, BILATERAL: ICD-10-CM

## 2025-06-10 DIAGNOSIS — H16.229 KERATOCONJUNCT SICCA, NOT SPECIFIED AS SJOGREN'S, UNSP EYE: Primary | ICD-10-CM

## 2025-06-10 DIAGNOSIS — H49.12 FOURTH NERVE PALSY OF LEFT EYE: ICD-10-CM

## 2025-06-10 PROCEDURE — G2211 COMPLEX E/M VISIT ADD ON: HCPCS | Mod: S$GLB,,, | Performed by: OPTOMETRIST

## 2025-06-10 PROCEDURE — 92015 DETERMINE REFRACTIVE STATE: CPT | Mod: S$GLB,,, | Performed by: OPTOMETRIST

## 2025-06-10 PROCEDURE — 1159F MED LIST DOCD IN RCRD: CPT | Mod: CPTII,S$GLB,, | Performed by: OPTOMETRIST

## 2025-06-10 PROCEDURE — 99214 OFFICE O/P EST MOD 30 MIN: CPT | Mod: S$GLB,,, | Performed by: OPTOMETRIST

## 2025-06-10 PROCEDURE — 99999 PR PBB SHADOW E&M-EST. PATIENT-LVL II: CPT | Mod: PBBFAC,,, | Performed by: OPTOMETRIST

## 2025-06-10 NOTE — PROGRESS NOTES
HPI    Silke Fulton is a 44 y.o. female who comes in for routine eye exam.   Pt. States vision have decreased at near. Pt. See flashes of light from OS   every now and then.  Pt have been experiencing aching and pulsating sensation in OS more than   OD for over a tara,  but it doesn't happen often.      (+)blurred vision(+)Headaches(--)diplopia  (+ OS)flashes(--)floaters(+)pain  (--)Itching(+)tearing(--)burning  (+)Dryness(+) OTC Drops(--)Photophobia    Refresh QD OU  Last edited by Jami Montalvo on 6/10/2025  4:01 PM.            Assessment /Plan     For exam results, see Encounter Report.    Keratoconjunct sicca, not specified as Sjogren's, unsp eye  Dry eye syndrome, bilateral  -  failed art tears  Failed  fluorometholone 0.1% (FML) 0.1 % DrpS; Place 1 drop into both eyes 2 (two) times daily. for 14 days  Dispense: 5 mL; Refill: 0  Failed   lifitegrast (XIIDRA) 5 % Dpet; Apply 1 drop to eye every 12 (twelve) hours.  Dispense: 180 each; Refill: 4     Start    perfluorohexyloctane, PF, 100 % Drop; Place 1 drop into both eyes 4 (four) times daily.  Dispense: 3 mL; Refill: 11    Astigmatism of both eyes, unspecified type  Presbyopia  Rx specs, continue with vertical prism  Rx specs for near/ computer to alleviate eye fatigue                Personal history of malignant neoplasm of brain  Fourth nerve palsy of left eye  History of strabismus surgery  Pt reports surgery when she was 11 years old with Dr. Momo Lamb internal ocular health today       RTC 1 year, sooner PRN

## 2025-07-26 ENCOUNTER — PATIENT MESSAGE (OUTPATIENT)
Dept: OPTOMETRY | Facility: CLINIC | Age: 45
End: 2025-07-26
Payer: COMMERCIAL

## 2025-09-05 ENCOUNTER — OFFICE VISIT (OUTPATIENT)
Facility: CLINIC | Age: 45
End: 2025-09-05
Payer: COMMERCIAL

## 2025-09-05 DIAGNOSIS — G43.711 CHRONIC MIGRAINE WITHOUT AURA, WITH INTRACTABLE MIGRAINE, SO STATED, WITH STATUS MIGRAINOSUS: Primary | ICD-10-CM

## 2025-09-05 DIAGNOSIS — G44.86 CERVICOGENIC HEADACHE: ICD-10-CM

## 2025-09-05 DIAGNOSIS — M54.81 BILATERAL OCCIPITAL NEURALGIA: ICD-10-CM

## 2025-09-05 RX ORDER — PROPRANOLOL HYDROCHLORIDE 60 MG/1
60 CAPSULE, EXTENDED RELEASE ORAL DAILY
Qty: 30 CAPSULE | Refills: 2 | Status: SHIPPED | OUTPATIENT
Start: 2025-09-05 | End: 2026-09-05